# Patient Record
Sex: MALE | Race: WHITE | NOT HISPANIC OR LATINO | Employment: OTHER | URBAN - METROPOLITAN AREA
[De-identification: names, ages, dates, MRNs, and addresses within clinical notes are randomized per-mention and may not be internally consistent; named-entity substitution may affect disease eponyms.]

---

## 2024-09-01 ENCOUNTER — HOSPITAL ENCOUNTER (EMERGENCY)
Facility: HOSPITAL | Age: 74
Discharge: HOME | End: 2024-09-01
Attending: EMERGENCY MEDICINE | Admitting: EMERGENCY MEDICINE
Payer: MEDICARE

## 2024-09-01 ENCOUNTER — APPOINTMENT (OUTPATIENT)
Dept: GENERAL RADIOLOGY | Facility: HOSPITAL | Age: 74
End: 2024-09-01
Payer: MEDICARE

## 2024-09-01 VITALS
WEIGHT: 245 LBS | SYSTOLIC BLOOD PRESSURE: 145 MMHG | BODY MASS INDEX: 33.18 KG/M2 | OXYGEN SATURATION: 96 % | HEIGHT: 72 IN | DIASTOLIC BLOOD PRESSURE: 76 MMHG | HEART RATE: 73 BPM | TEMPERATURE: 98.4 F | RESPIRATION RATE: 18 BRPM

## 2024-09-01 DIAGNOSIS — R60.0 PERIPHERAL EDEMA: Primary | ICD-10-CM

## 2024-09-01 DIAGNOSIS — I50.9 CHRONIC HEART FAILURE, UNSPECIFIED HEART FAILURE TYPE: ICD-10-CM

## 2024-09-01 LAB
ALBUMIN SERPL-MCNC: 3.5 G/DL (ref 3.5–5.2)
ALBUMIN/GLOB SERPL: 1.3 G/DL
ALP SERPL-CCNC: 43 U/L (ref 39–117)
ALT SERPL W P-5'-P-CCNC: 30 U/L (ref 1–41)
ANION GAP SERPL CALCULATED.3IONS-SCNC: 9 MMOL/L (ref 5–15)
AST SERPL-CCNC: 34 U/L (ref 1–40)
BASOPHILS # BLD AUTO: 0.03 10*3/MM3 (ref 0–0.2)
BASOPHILS NFR BLD AUTO: 0.4 % (ref 0–1.5)
BILIRUB SERPL-MCNC: 0.3 MG/DL (ref 0–1.2)
BUN SERPL-MCNC: 25 MG/DL (ref 8–23)
BUN/CREAT SERPL: 21.7 (ref 7–25)
CALCIUM SPEC-SCNC: 8.9 MG/DL (ref 8.6–10.5)
CHLORIDE SERPL-SCNC: 103 MMOL/L (ref 98–107)
CO2 SERPL-SCNC: 31 MMOL/L (ref 22–29)
CREAT SERPL-MCNC: 1.15 MG/DL (ref 0.76–1.27)
DEPRECATED RDW RBC AUTO: 59 FL (ref 37–54)
EGFRCR SERPLBLD CKD-EPI 2021: 66.4 ML/MIN/1.73
EOSINOPHIL # BLD AUTO: 0.03 10*3/MM3 (ref 0–0.4)
EOSINOPHIL NFR BLD AUTO: 0.4 % (ref 0.3–6.2)
ERYTHROCYTE [DISTWIDTH] IN BLOOD BY AUTOMATED COUNT: 15.7 % (ref 12.3–15.4)
GEN 5 2HR TROPONIN T REFLEX: 113 NG/L
GLOBULIN UR ELPH-MCNC: 2.8 GM/DL
GLUCOSE SERPL-MCNC: 133 MG/DL (ref 65–99)
HCT VFR BLD AUTO: 38.2 % (ref 37.5–51)
HGB BLD-MCNC: 12.1 G/DL (ref 13–17.7)
HOLD SPECIMEN: NORMAL
IMM GRANULOCYTES # BLD AUTO: 0.02 10*3/MM3 (ref 0–0.05)
IMM GRANULOCYTES NFR BLD AUTO: 0.2 % (ref 0–0.5)
LYMPHOCYTES # BLD AUTO: 3.09 10*3/MM3 (ref 0.7–3.1)
LYMPHOCYTES NFR BLD AUTO: 37.9 % (ref 19.6–45.3)
MCH RBC QN AUTO: 32.4 PG (ref 26.6–33)
MCHC RBC AUTO-ENTMCNC: 31.7 G/DL (ref 31.5–35.7)
MCV RBC AUTO: 102.4 FL (ref 79–97)
MONOCYTES # BLD AUTO: 0.75 10*3/MM3 (ref 0.1–0.9)
MONOCYTES NFR BLD AUTO: 9.2 % (ref 5–12)
NEUTROPHILS NFR BLD AUTO: 4.23 10*3/MM3 (ref 1.7–7)
NEUTROPHILS NFR BLD AUTO: 51.9 % (ref 42.7–76)
NRBC BLD AUTO-RTO: 0 /100 WBC (ref 0–0.2)
NT-PROBNP SERPL-MCNC: 965.4 PG/ML (ref 0–1800)
PLATELET # BLD AUTO: 197 10*3/MM3 (ref 140–450)
PMV BLD AUTO: 9.3 FL (ref 6–12)
POTASSIUM SERPL-SCNC: 4.4 MMOL/L (ref 3.5–5.2)
PROT SERPL-MCNC: 6.3 G/DL (ref 6–8.5)
QT INTERVAL: 406 MS
QTC INTERVAL: 435 MS
RBC # BLD AUTO: 3.73 10*6/MM3 (ref 4.14–5.8)
SODIUM SERPL-SCNC: 143 MMOL/L (ref 136–145)
TROPONIN T DELTA: 4 NG/L
TROPONIN T SERPL HS-MCNC: 109 NG/L
WBC NRBC COR # BLD AUTO: 8.15 10*3/MM3 (ref 3.4–10.8)
WHOLE BLOOD HOLD COAG: NORMAL
WHOLE BLOOD HOLD SPECIMEN: NORMAL

## 2024-09-01 PROCEDURE — 80053 COMPREHEN METABOLIC PANEL: CPT | Performed by: EMERGENCY MEDICINE

## 2024-09-01 PROCEDURE — 96374 THER/PROPH/DIAG INJ IV PUSH: CPT

## 2024-09-01 PROCEDURE — 25010000002 BUMETANIDE PER 0.5 MG: Performed by: EMERGENCY MEDICINE

## 2024-09-01 PROCEDURE — 85025 COMPLETE CBC W/AUTO DIFF WBC: CPT | Performed by: EMERGENCY MEDICINE

## 2024-09-01 PROCEDURE — 83880 ASSAY OF NATRIURETIC PEPTIDE: CPT | Performed by: EMERGENCY MEDICINE

## 2024-09-01 PROCEDURE — 36415 COLL VENOUS BLD VENIPUNCTURE: CPT

## 2024-09-01 PROCEDURE — 71045 X-RAY EXAM CHEST 1 VIEW: CPT

## 2024-09-01 PROCEDURE — 84484 ASSAY OF TROPONIN QUANT: CPT | Performed by: EMERGENCY MEDICINE

## 2024-09-01 PROCEDURE — 99284 EMERGENCY DEPT VISIT MOD MDM: CPT

## 2024-09-01 PROCEDURE — 93005 ELECTROCARDIOGRAM TRACING: CPT | Performed by: EMERGENCY MEDICINE

## 2024-09-01 RX ORDER — BUMETANIDE 2 MG/1
2 TABLET ORAL DAILY
Qty: 3 TABLET | Refills: 0 | Status: SHIPPED | OUTPATIENT
Start: 2024-09-01 | End: 2024-09-04

## 2024-09-01 RX ORDER — SODIUM CHLORIDE 0.9 % (FLUSH) 0.9 %
10 SYRINGE (ML) INJECTION AS NEEDED
Status: DISCONTINUED | OUTPATIENT
Start: 2024-09-01 | End: 2024-09-02 | Stop reason: HOSPADM

## 2024-09-01 RX ORDER — BUMETANIDE 0.25 MG/ML
2 INJECTION INTRAMUSCULAR; INTRAVENOUS ONCE
Status: COMPLETED | OUTPATIENT
Start: 2024-09-01 | End: 2024-09-01

## 2024-09-01 RX ADMIN — BUMETANIDE 2 MG: 0.25 INJECTION INTRAMUSCULAR; INTRAVENOUS at 21:20

## 2024-09-02 NOTE — ED PROVIDER NOTES
[place]    EMERGENCY DEPARTMENT ENCOUNTER      Pt Name: Jonatan Mcguire  MRN: [id number]  YOB: 1950  Date of evaluation: 9/1/2024  Provider: Jethro Quarles DO    CHIEF COMPLAINT       Chief Complaint   Patient presents with    Shortness of Breath       HPI  Stated Reason for Visit: Pt arrives to ED via EMS from home c/o shortness of breath that has been getting worse over 1x weeks. Pt has a hx of COPD and CHF. Pt received a duo neb via ems. Pt has bilat ronchi and wheezes History Obtained From: EMS       HISTORY OF PRESENT ILLNESS  (Location/Symptom, Timing/Onset, Context/Setting, Quality, Duration, Modifying Factors, Severity.)   Jonatan Mcguire is a 74 y.o. male who presents to the emergency department via EMS for evaluation of shortness of breath and dyspnea with exertion which been progressive worsen over the last 1 week.  His known history of COPD, CHF, has been take his medication as prescribed, uses a CPAP and oxygen in the evenings.  Taking spironolactone 12.5 mg.  He notes basic activities which is usually upper performed gets very shortness of breath which is abnormal for him.  He believes he is on a low-dose diuretic, does not follow with a particular cardiologist.  He denies any fever, chills, he does note a viral illness approximately a week or 2 ago and states he had medication for treatment and therapies, denies any fever or chills.  He has a known history of peripheral edema, venous stasis dermatitis.  He denies any other acute systemic complaints at this time.      Nursing notes were reviewed.      PAST MEDICAL HISTORY   No past medical history on file.      SURGICAL HISTORY     No past surgical history on file.      CURRENT MEDICATIONS       Current Facility-Administered Medications:     sodium chloride 0.9 % flush 10 mL, 10 mL, Intravenous, PRN, Jethro Quarles DO    Current Outpatient Medications:     bumetanide (BUMEX) 2 MG tablet, Take 1 tablet by mouth Daily for 3 days.,  Disp: 3 tablet, Rfl: 0    ALLERGIES     Tetanus toxoids    FAMILY HISTORY     No family history on file.       SOCIAL HISTORY       Social History     Socioeconomic History    Marital status:          PHYSICAL EXAM    (up to 7 for level 4, 8 or more for level 5)     Vitals:    09/01/24 2147 09/01/24 2202 09/01/24 2217 09/01/24 2232   BP: 122/70 131/63 137/60 145/76   BP Location:       Patient Position:       Pulse: 68 62 64 73   Resp:       Temp:       TempSrc:       SpO2: 95% 96% 96% 96%   Weight:       Height:           Physical Exam  General : Patient is awake, alert, oriented, in no acute distress, nontoxic appearing  HEENT: Pupils are equally round, EOMI, conjunctivae clear  Neck: Neck is supple, full range of motion, trachea midline  Cardiac: Heart regular rate, rhythm, no murmurs, rubs, or gallops  Lungs: Lungs decreased breath sounds bilaterally, scattered rhonchi to bilateral bases  Abdomen: Abdomen is soft, nontender, nondistended. There are no firm or pulsatile masses, no rebound rigidity or guarding  Musculoskeletal:  No focal muscle deficits are appreciated  Neuro: Motor intact, sensory intact, level of consciousness is normal, cerebellar function is normal, reflexes are grossly normal. No evidence of incontinence or loss of bowel or bladder function, no saddle anesthesia noted   Dermatology: Venostasis dermatitis to bilateral lower extremities, 2+ edema from mid calf down through bilateral feet skin is warm and dry  Psych: Mentation is grossly normal, cognition is grossly normal. Affect is appropriate      DIAGNOSTIC RESULTS     EKG:  All EKGs are interpreted by the Emergency Department Physician who either signs or Co-signs this chart in the absence of a cardiologist.    ECG 12 Lead ED Triage Standing Order; SOA   Final Result   Test Reason : ED Triage Standing Order~   Blood Pressure :   */*   mmHG   Vent. Rate :  69 BPM     Atrial Rate :  52 BPM      P-R Int :   * ms          QRS Dur : 126  ms       QT Int : 406 ms       P-R-T Axes :   * 109 204 degrees      QTc Int : 435 ms      Atrial fibrillation with a competing junctional pacemaker   Nonspecific intraventricular block   Possible Right ventricular hypertrophy   T wave abnormality, consider inferior ischemia   T wave abnormality, consider anterolateral ischemia   Abnormal ECG   When compared with ECG of 18-AUG-2015 14:02,   Questionable change in QRS duration   Confirmed by RUTH CELIS MD ([id number]) on 9/1/2024 8:23:03 PM      Referred By: edmd           Confirmed By: RUTH CELIS MD          RADIOLOGY:     [x] Radiologist's Report Reviewed:  XR Chest 1 View   Final Result   Impression:   Cardiomegaly with mild pulmonary vascular congestion. Small left effusion with left basilar atelectasis.            Electronically Signed: Prasanth Gordillo MD     9/1/2024 8:25 PM EDT     Workstation ID: [id number]          I ordered and independently reviewed the above noted radiographic studies.      I viewed images of chest x-ray which showed cardiomegaly, vascular congestion per my independent interpretation.    See radiologist's dictation for official interpretation.      ED BEDSIDE ULTRASOUND:   Performed by ED Physician - none    LABS:    I have reviewed and interpreted all of the currently available lab results from this visit (if applicable):  Results for orders placed or performed during the hospital encounter of 09/01/24   Comprehensive Metabolic Panel    Specimen: Blood   Result Value Ref Range    Glucose 133 (H) 65 - 99 mg/dL    BUN 25 (H) 8 - 23 mg/dL    Creatinine 1.15 0.76 - 1.27 mg/dL    Sodium 143 136 - 145 mmol/L    Potassium 4.4 3.5 - 5.2 mmol/L    Chloride 103 98 - 107 mmol/L    CO2 31.0 (H) 22.0 - 29.0 mmol/L    Calcium 8.9 8.6 - 10.5 mg/dL    Total Protein 6.3 6.0 - 8.5 g/dL    Albumin 3.5 3.5 - 5.2 g/dL    ALT (SGPT) 30 1 - 41 U/L    AST (SGOT) 34 1 - 40 U/L    Alkaline Phosphatase 43 39 - 117 U/L    Total Bilirubin 0.3 0.0 - 1.2 mg/dL     Globulin 2.8 gm/dL    A/G Ratio 1.3 g/dL    BUN/Creatinine Ratio 21.7 7.0 - 25.0    Anion Gap 9.0 5.0 - 15.0 mmol/L    eGFR 66.4 >60.0 mL/min/1.73   BNP    Specimen: Blood   Result Value Ref Range    proBNP 965.4 0.0 - 1,800.0 pg/mL   Single High Sensitivity Troponin T    Specimen: Blood   Result Value Ref Range    HS Troponin T 109 (C) <22 ng/L   CBC Auto Differential    Specimen: Blood   Result Value Ref Range    WBC 8.15 3.40 - 10.80 10*3/mm3    RBC 3.73 (L) 4.14 - 5.80 10*6/mm3    Hemoglobin 12.1 (L) 13.0 - 17.7 g/dL    Hematocrit 38.2 37.5 - 51.0 %    .4 (H) 79.0 - 97.0 fL    MCH 32.4 26.6 - 33.0 pg    MCHC 31.7 31.5 - 35.7 g/dL    RDW 15.7 (H) 12.3 - 15.4 %    RDW-SD 59.0 (H) 37.0 - 54.0 fl    MPV 9.3 6.0 - 12.0 fL    Platelets 197 140 - 450 10*3/mm3    Neutrophil % 51.9 42.7 - 76.0 %    Lymphocyte % 37.9 19.6 - 45.3 %    Monocyte % 9.2 5.0 - 12.0 %    Eosinophil % 0.4 0.3 - 6.2 %    Basophil % 0.4 0.0 - 1.5 %    Immature Grans % 0.2 0.0 - 0.5 %    Neutrophils, Absolute 4.23 1.70 - 7.00 10*3/mm3    Lymphocytes, Absolute 3.09 0.70 - 3.10 10*3/mm3    Monocytes, Absolute 0.75 0.10 - 0.90 10*3/mm3    Eosinophils, Absolute 0.03 0.00 - 0.40 10*3/mm3    Basophils, Absolute 0.03 0.00 - 0.20 10*3/mm3    Immature Grans, Absolute 0.02 0.00 - 0.05 10*3/mm3    nRBC 0.0 0.0 - 0.2 /100 WBC   High Sensitivity Troponin T 2Hr    Specimen: Blood   Result Value Ref Range    HS Troponin T 113 (C) <22 ng/L    Troponin T Delta 4 (C) >=-4 - <+4 ng/L   ECG 12 Lead ED Triage Standing Order; SOA   Result Value Ref Range    QT Interval 406 ms    QTC Interval 435 ms   Green Top (Gel)   Result Value Ref Range    Extra Tube Hold for add-ons.    Lavender Top   Result Value Ref Range    Extra Tube hold for add-on    Gold Top - SST   Result Value Ref Range    Extra Tube Hold for add-ons.    Gray Top   Result Value Ref Range    Extra Tube Hold for add-ons.    Light Blue Top   Result Value Ref Range    Extra Tube Hold for add-ons.          If labs were ordered, I independently reviewed the results and considered them in treating the patient.      EMERGENCY DEPARTMENT COURSE and DIFFERENTIAL DIAGNOSIS/MDM:   Vitals:  AS OF 23:49 EDT    BP - 145/76  HR - 73  TEMP - 98.4 °F (36.9 °C) (Oral)  O2 SATS - 96%      Orders placed during this visit:  Orders Placed This Encounter   Procedures    XR Chest 1 View    [place] Draw    Comprehensive Metabolic Panel    BNP    Single High Sensitivity Troponin T    CBC Auto Differential    High Sensitivity Troponin T 2Hr    NPO Diet NPO Type: Strict NPO    Undress & Gown    Continuous Pulse Oximetry    Vital Signs    Oxygen Therapy- Nasal Cannula; Titrate 1-6 LPM Per SpO2; 90 - 95%    ECG 12 Lead ED Triage Standing Order; SOA    Insert Peripheral IV    CBC & Differential    Green Top (Gel)    Lavender Top    Gold Top - SST    Gray Top    Light Blue Top       All labs have been independently reviewed by me.  All radiology studies have been reviewed by me and the radiologist dictating the report.  All EKG's have been independently viewed and interpreted by me.      Discussion below represents my analysis of pertinent findings related to patient's condition, differential diagnosis, treatment plan and final disposition.    Differential diagnosis:  The differential diagnosis associated with the patient's presentation includes: Acute on chronic heart failure, pleural effusion, pneumonia, COPD exacerbation, sepsis    Additional sources  Discussed/ obtained information from independent historians:   [] Spouse  [] Parent  [] Family member  [] Friend  [x] EMS   [] Other:    External (non-ED) record review:   [] Inpatient record:   [] Office record:   [] Outpatient record:   [] Prior Outpatient labs:   [] Prior Outpatient radiology:   [] Primary Care record:   [] Outside ED record:   [] Other:     Patient's care impacted by:   [] Diabetes  [] Hypertension  [x] CHF  [] Hyperlipidemia  [] Coronary Artery Disease   [x] COPD   []  Cancer   [] Tobacco Abuse   [] Substance Abuse    [] Other:     Care significantly affected by Social Determinants of Health (housing and economic circumstances, unemployment)    [] Yes     [x] No   If yes, Patient's care significantly limited by Social Determinants of Health including:   [] Inadequate housing   [] Low income   [] Alcoholism and drug addiction in family   [] Problems related to primary support group   [] Unemployment   [] Problems related to employment   [] Other Social Determinants of Health:       MEDICATIONS ADMINISTERED IN ED:  Medications   sodium chloride 0.9 % flush 10 mL (has no administration in time range)   bumetanide (BUMEX) injection 2 mg (2 mg Intravenous Given 9/1/24 2120)              This is 74-year-old male who presents with shortness of breath worsening dyspnea over the last 1 week.  Has underlying history of congestive heart failure and COPD.  He is on spironolactone 12.5 mg daily, has been estates dermatitis of bilateral lower extremities, oxygen saturation 88 to 90% on room air at rest.  Patient was placed on supplemental oxygen, IV diuresis.  His labs obtained.  Vital signs are otherwise stable, patient is afebrile.  Chest x-ray with cardiomegaly, very mild effusion.  Patient's BNP is 965, creatinine 1.15.  Troponins were 109, 113 on recheck.  Patient is chest pain-free, he has been diuresing after the IV Bumex.  He has a CPAP machine and supplemental oxygen at home, he would feel more comfortable at home with increased diuresis for the next few days.  He has his cardiologist through [facility] who we will call on Tuesday morning, establish care with them already.  He would prefer to increase his diuretic for the next few days, monitoring his symptoms, and follow closely with his PCP in addition to his cardiologist.  Here prefer this rather than being admitted at this time.  Patient will be given Bumex for the next few days, will have him follow his symptoms closely with  his cardiology team, return to the ED with any worsening symptoms or further concerns in the meantime.    I had a discussion with the patient/family regarding diagnosis, diagnostic results, treatment plan, and medications.  The patient/family indicated understanding of these instructions.  I spent adequate time at the bedside preceding discharge necessary to personally discuss the aftercare instructions, giving patient education, providing explanations of the results of our evaluations/findings, and my decision making to assure that the patient/family understand the plan of care.  Time was allotted to answer questions at that time and throughout the ED course.  Emphasis was placed on timely follow-up after discharge.  I also discussed the potential for the development of an acute emergent condition requiring further evaluation, admission, or even surgical intervention. I discussed that we found nothing during the visit today indicating the need for further workup, admission, or the presence of an unstable medical condition.  I encouraged the patient to return to the emergency department immediately for ANY concerns, worsening, new complaints, or if symptoms persist and unable to seek follow-up in a timely fashion.  The patient/family expressed understanding and agreement with this plan.  The patient will follow-up with their PCP in 1-2 days for reevaluation.     PROCEDURES:  Procedures    CRITICAL CARE TIME    Total Critical Care time was 0 minutes, excluding separately reportable procedures.   There was a high probability of clinically significant/life threatening deterioration in the patient's condition which required my urgent intervention.      FINAL IMPRESSION      1. Peripheral edema    2. Chronic heart failure, unspecified heart failure type          DISPOSITION/PLAN     ED Disposition       ED Disposition   Discharge    Condition   Stable    Comment   --               PATIENT REFERRED TO:  Your cardiology  team through [facility]    Schedule an appointment as soon as possible for a visit       [facility] EMERGENCY DEPARTMENT  [address] [facility] [address] [phone]    If symptoms worsen      DISCHARGE MEDICATIONS:     Medication List        START taking these medications      bumetanide 2 MG tablet  Commonly known as: BUMEX  Take 1 tablet by mouth Daily for 3 days.               Where to Get Your Medications        These medications were sent to [facility] [id number] - [address] [phone] [place] [id number] [address]      Phone: [phone]   bumetanide 2 MG tablet             Comment: Please note this report has been produced using speech recognition software.      Jethro Quarles DO  Attending Emergency Physician         Jethro Quarles,   09/01/24 0503

## 2024-10-01 ENCOUNTER — HOSPITAL ENCOUNTER (EMERGENCY)
Facility: HOSPITAL | Age: 74
Discharge: HOME | End: 2024-10-02
Attending: EMERGENCY MEDICINE
Payer: MEDICARE

## 2024-10-01 ENCOUNTER — APPOINTMENT (OUTPATIENT)
Dept: GENERAL RADIOLOGY | Facility: HOSPITAL | Age: 74
End: 2024-10-01
Payer: MEDICARE

## 2024-10-01 DIAGNOSIS — J12.82 PNEUMONIA DUE TO COVID-19 VIRUS: Primary | ICD-10-CM

## 2024-10-01 DIAGNOSIS — U07.1 PNEUMONIA DUE TO COVID-19 VIRUS: Primary | ICD-10-CM

## 2024-10-01 DIAGNOSIS — J96.21 ACUTE ON CHRONIC RESPIRATORY FAILURE WITH HYPOXIA: ICD-10-CM

## 2024-10-01 DIAGNOSIS — J44.1 COPD WITH ACUTE EXACERBATION: ICD-10-CM

## 2024-10-01 LAB
ALBUMIN SERPL-MCNC: 3.5 G/DL (ref 3.5–5.2)
ALBUMIN/GLOB SERPL: 1.2 G/DL
ALP SERPL-CCNC: 52 U/L (ref 39–117)
ALT SERPL W P-5'-P-CCNC: 32 U/L (ref 1–41)
ANION GAP SERPL CALCULATED.3IONS-SCNC: 9 MMOL/L (ref 5–15)
AST SERPL-CCNC: 37 U/L (ref 1–40)
ATMOSPHERIC PRESS: ABNORMAL MM[HG]
BASE EXCESS BLDV CALC-SCNC: 6.3 MMOL/L (ref -2–2)
BASOPHILS # BLD AUTO: 0.02 10*3/MM3 (ref 0–0.2)
BASOPHILS NFR BLD AUTO: 0.2 % (ref 0–1.5)
BDY SITE: ABNORMAL
BILIRUB SERPL-MCNC: 0.9 MG/DL (ref 0–1.2)
BODY TEMPERATURE: 37
BUN SERPL-MCNC: 24 MG/DL (ref 8–23)
BUN/CREAT SERPL: 23.1 (ref 7–25)
CALCIUM SPEC-SCNC: 8.6 MG/DL (ref 8.6–10.5)
CHLORIDE SERPL-SCNC: 102 MMOL/L (ref 98–107)
CO2 BLDA-SCNC: 35.1 MMOL/L (ref 22–33)
CO2 SERPL-SCNC: 30 MMOL/L (ref 22–29)
COHGB MFR BLD: 2.2 %
CREAT SERPL-MCNC: 1.04 MG/DL (ref 0.76–1.27)
D-LACTATE SERPL-SCNC: 0.8 MMOL/L (ref 0.5–2)
DEPRECATED RDW RBC AUTO: 59.4 FL (ref 37–54)
EGFRCR SERPLBLD CKD-EPI 2021: 74.9 ML/MIN/1.73
EOSINOPHIL # BLD AUTO: 0 10*3/MM3 (ref 0–0.4)
EOSINOPHIL NFR BLD AUTO: 0 % (ref 0.3–6.2)
EPAP: 0
ERYTHROCYTE [DISTWIDTH] IN BLOOD BY AUTOMATED COUNT: 15.2 % (ref 12.3–15.4)
GLOBULIN UR ELPH-MCNC: 2.9 GM/DL
GLUCOSE SERPL-MCNC: 88 MG/DL (ref 65–99)
HCO3 BLDV-SCNC: 33.3 MMOL/L (ref 22–28)
HCT VFR BLD AUTO: 37.6 % (ref 37.5–51)
HGB BLD-MCNC: 11.9 G/DL (ref 13–17.7)
HGB BLDA-MCNC: 12.2 G/DL (ref 13.5–17.5)
IMM GRANULOCYTES # BLD AUTO: 0.06 10*3/MM3 (ref 0–0.05)
IMM GRANULOCYTES NFR BLD AUTO: 0.5 % (ref 0–0.5)
INHALED O2 CONCENTRATION: 28 %
IPAP: 0
LYMPHOCYTES # BLD AUTO: 1.96 10*3/MM3 (ref 0.7–3.1)
LYMPHOCYTES NFR BLD AUTO: 15.7 % (ref 19.6–45.3)
MCH RBC QN AUTO: 32.7 PG (ref 26.6–33)
MCHC RBC AUTO-ENTMCNC: 31.6 G/DL (ref 31.5–35.7)
MCV RBC AUTO: 103.3 FL (ref 79–97)
METHGB BLD QL: 0.6 %
MODALITY: ABNORMAL
MONOCYTES # BLD AUTO: 0.86 10*3/MM3 (ref 0.1–0.9)
MONOCYTES NFR BLD AUTO: 6.9 % (ref 5–12)
NEUTROPHILS NFR BLD AUTO: 76.7 % (ref 42.7–76)
NEUTROPHILS NFR BLD AUTO: 9.56 10*3/MM3 (ref 1.7–7)
NRBC BLD AUTO-RTO: 0 /100 WBC (ref 0–0.2)
OXYHGB MFR BLDV: 49.8 %
PAW @ PEAK INSP FLOW SETTING VENT: 0 CMH2O
PCO2 BLDV: 58.5 MM HG (ref 41–51)
PH BLDV: 7.36 PH UNITS (ref 7.31–7.41)
PLATELET # BLD AUTO: 136 10*3/MM3 (ref 140–450)
PMV BLD AUTO: 9.8 FL (ref 6–12)
PO2 BLDV: 29.6 MM HG (ref 27–53)
POTASSIUM SERPL-SCNC: 4.4 MMOL/L (ref 3.5–5.2)
PROT SERPL-MCNC: 6.4 G/DL (ref 6–8.5)
RBC # BLD AUTO: 3.64 10*6/MM3 (ref 4.14–5.8)
SODIUM SERPL-SCNC: 141 MMOL/L (ref 136–145)
TOTAL RATE: 0 BREATHS/MINUTE
WBC NRBC COR # BLD AUTO: 12.46 10*3/MM3 (ref 3.4–10.8)

## 2024-10-01 PROCEDURE — 25010000002 KETOROLAC TROMETHAMINE PER 15 MG: Performed by: EMERGENCY MEDICINE

## 2024-10-01 PROCEDURE — 82805 BLOOD GASES W/O2 SATURATION: CPT

## 2024-10-01 PROCEDURE — 25010000002 METHYLPREDNISOLONE PER 125 MG: Performed by: EMERGENCY MEDICINE

## 2024-10-01 PROCEDURE — 80053 COMPREHEN METABOLIC PANEL: CPT | Performed by: EMERGENCY MEDICINE

## 2024-10-01 PROCEDURE — 25010000002 MAGNESIUM SULFATE 2 GM/50ML SOLUTION: Performed by: EMERGENCY MEDICINE

## 2024-10-01 PROCEDURE — 96366 THER/PROPH/DIAG IV INF ADDON: CPT

## 2024-10-01 PROCEDURE — 71045 X-RAY EXAM CHEST 1 VIEW: CPT

## 2024-10-01 PROCEDURE — 93005 ELECTROCARDIOGRAM TRACING: CPT | Performed by: EMERGENCY MEDICINE

## 2024-10-01 PROCEDURE — 83605 ASSAY OF LACTIC ACID: CPT | Performed by: EMERGENCY MEDICINE

## 2024-10-01 PROCEDURE — 96375 TX/PRO/DX INJ NEW DRUG ADDON: CPT

## 2024-10-01 PROCEDURE — 25810000003 SODIUM CHLORIDE 0.9 % SOLUTION: Performed by: EMERGENCY MEDICINE

## 2024-10-01 PROCEDURE — 99285 EMERGENCY DEPT VISIT HI MDM: CPT

## 2024-10-01 PROCEDURE — 94799 UNLISTED PULMONARY SVC/PX: CPT

## 2024-10-01 PROCEDURE — 85025 COMPLETE CBC W/AUTO DIFF WBC: CPT | Performed by: EMERGENCY MEDICINE

## 2024-10-01 PROCEDURE — 94640 AIRWAY INHALATION TREATMENT: CPT

## 2024-10-01 PROCEDURE — 96365 THER/PROPH/DIAG IV INF INIT: CPT

## 2024-10-01 RX ORDER — IPRATROPIUM BROMIDE AND ALBUTEROL SULFATE 2.5; .5 MG/3ML; MG/3ML
3 SOLUTION RESPIRATORY (INHALATION) ONCE
Status: COMPLETED | OUTPATIENT
Start: 2024-10-01 | End: 2024-10-01

## 2024-10-01 RX ORDER — ACETAMINOPHEN 500 MG
1000 TABLET ORAL ONCE
Status: DISCONTINUED | OUTPATIENT
Start: 2024-10-01 | End: 2024-10-02 | Stop reason: HOSPADM

## 2024-10-01 RX ORDER — KETOROLAC TROMETHAMINE 15 MG/ML
15 INJECTION, SOLUTION INTRAMUSCULAR; INTRAVENOUS ONCE
Status: COMPLETED | OUTPATIENT
Start: 2024-10-01 | End: 2024-10-01

## 2024-10-01 RX ORDER — MAGNESIUM SULFATE HEPTAHYDRATE 40 MG/ML
2 INJECTION, SOLUTION INTRAVENOUS ONCE
Status: COMPLETED | OUTPATIENT
Start: 2024-10-01 | End: 2024-10-01

## 2024-10-01 RX ORDER — METHYLPREDNISOLONE SODIUM SUCCINATE 125 MG/2ML
125 INJECTION, POWDER, LYOPHILIZED, FOR SOLUTION INTRAMUSCULAR; INTRAVENOUS ONCE
Status: COMPLETED | OUTPATIENT
Start: 2024-10-01 | End: 2024-10-01

## 2024-10-01 RX ORDER — SODIUM CHLORIDE 0.9 % (FLUSH) 0.9 %
10 SYRINGE (ML) INJECTION AS NEEDED
Status: DISCONTINUED | OUTPATIENT
Start: 2024-10-01 | End: 2024-10-02 | Stop reason: HOSPADM

## 2024-10-01 RX ADMIN — MAGNESIUM SULFATE HEPTAHYDRATE 2 G: 40 INJECTION, SOLUTION INTRAVENOUS at 22:19

## 2024-10-01 RX ADMIN — KETOROLAC TROMETHAMINE 15 MG: 15 INJECTION, SOLUTION INTRAMUSCULAR; INTRAVENOUS at 22:19

## 2024-10-01 RX ADMIN — METHYLPREDNISOLONE SODIUM SUCCINATE 125 MG: 125 INJECTION, POWDER, LYOPHILIZED, FOR SOLUTION INTRAMUSCULAR; INTRAVENOUS at 22:20

## 2024-10-01 RX ADMIN — IPRATROPIUM BROMIDE AND ALBUTEROL SULFATE 3 ML: 2.5; .5 SOLUTION RESPIRATORY (INHALATION) at 22:09

## 2024-10-01 RX ADMIN — SODIUM CHLORIDE 500 ML: 9 INJECTION, SOLUTION INTRAVENOUS at 22:19

## 2024-10-02 VITALS
HEIGHT: 72 IN | SYSTOLIC BLOOD PRESSURE: 116 MMHG | BODY MASS INDEX: 33.18 KG/M2 | RESPIRATION RATE: 18 BRPM | HEART RATE: 72 BPM | WEIGHT: 245 LBS | OXYGEN SATURATION: 95 % | DIASTOLIC BLOOD PRESSURE: 58 MMHG | TEMPERATURE: 98.5 F

## 2024-10-02 LAB
FLUAV RNA RESP QL NAA+PROBE: NOT DETECTED
FLUBV RNA RESP QL NAA+PROBE: NOT DETECTED
QT INTERVAL: 352 MS
QTC INTERVAL: 418 MS
SARS-COV-2 RNA RESP QL NAA+PROBE: DETECTED

## 2024-10-02 PROCEDURE — 94799 UNLISTED PULMONARY SVC/PX: CPT

## 2024-10-02 PROCEDURE — 87636 SARSCOV2 & INF A&B AMP PRB: CPT | Performed by: EMERGENCY MEDICINE

## 2024-10-02 RX ORDER — MONTELUKAST SODIUM 10 MG/1
10 TABLET ORAL NIGHTLY
COMMUNITY

## 2024-10-02 RX ORDER — PREDNISONE 20 MG/1
60 TABLET ORAL DAILY
Qty: 15 TABLET | Refills: 0 | Status: SHIPPED | OUTPATIENT
Start: 2024-10-02 | End: 2024-10-05

## 2024-10-02 RX ORDER — LISINOPRIL 2.5 MG/1
2.5 TABLET ORAL DAILY
COMMUNITY
End: 2024-10-05

## 2024-10-02 RX ORDER — DOXAZOSIN 8 MG/1
8 TABLET ORAL NIGHTLY
COMMUNITY

## 2024-10-02 RX ORDER — PRENATAL WITH FERROUS FUM AND FOLIC ACID 3080; 920; 120; 400; 22; 1.84; 3; 20; 10; 1; 12; 200; 27; 25; 2 [IU]/1; [IU]/1; MG/1; [IU]/1; MG/1; MG/1; MG/1; MG/1; MG/1; MG/1; UG/1; MG/1; MG/1; MG/1; MG/1
1 TABLET ORAL DAILY
COMMUNITY

## 2024-10-02 RX ORDER — SPIRONOLACTONE 25 MG/1
12.5 TABLET ORAL DAILY
Status: ON HOLD | COMMUNITY
End: 2024-10-10

## 2024-10-02 RX ORDER — PREDNISONE 20 MG/1
60 TABLET ORAL DAILY
COMMUNITY
Start: 2024-10-02 | End: 2024-10-10 | Stop reason: HOSPADM

## 2024-10-02 RX ORDER — GLIPIZIDE 5 MG/1
5 TABLET, FILM COATED, EXTENDED RELEASE ORAL DAILY
COMMUNITY

## 2024-10-02 RX ORDER — ACETAMINOPHEN 80 MG/1
80 TABLET, CHEWABLE ORAL EVERY 4 HOURS PRN
COMMUNITY
End: 2024-10-05

## 2024-10-02 RX ORDER — IPRATROPIUM BROMIDE AND ALBUTEROL SULFATE 2.5; .5 MG/3ML; MG/3ML
3 SOLUTION RESPIRATORY (INHALATION) ONCE
Status: COMPLETED | OUTPATIENT
Start: 2024-10-02 | End: 2024-10-02

## 2024-10-02 RX ORDER — HYDRALAZINE HYDROCHLORIDE 10 MG/1
10 TABLET, FILM COATED ORAL 3 TIMES DAILY
COMMUNITY
End: 2024-10-05

## 2024-10-02 RX ADMIN — IPRATROPIUM BROMIDE AND ALBUTEROL SULFATE 3 ML: 2.5; .5 SOLUTION RESPIRATORY (INHALATION) at 01:40

## 2024-10-02 NOTE — ED PROVIDER NOTES
Subjective   History of Present Illness  Patient presents for evaluation of symptoms including shortness of breath, cough, congestion, myalgias.  These have been progressively worsening over the past 3 days.  He took a home test for COVID and was positive today.  Patient has a history of COPD and feels that is acting up with increased wheezing.  He is currently on his 2 L nasal cannula which she wears most of the time.  He has had fever.  He is not having any specific chest pain.    History provided by:  Patient      Review of Systems    No past medical history on file.    Allergies   Allergen Reactions    Tetanus Toxoids Unknown - Low Severity       No past surgical history on file.    No family history on file.    Social History     Socioeconomic History    Marital status:            Objective   Physical Exam  Constitutional:       General: He is not in acute distress.     Appearance: He is ill-appearing.   HENT:      Head: Normocephalic and atraumatic.   Eyes:      Conjunctiva/sclera: Conjunctivae normal.      Pupils: Pupils are equal, round, and reactive to light.   Cardiovascular:      Rate and Rhythm: Normal rate and regular rhythm.      Pulses: Normal pulses.      Heart sounds: No murmur heard.     No gallop.   Pulmonary:      Comments: Tachypneic without increased work of breathing.  Diffuse expiratory wheezing with moderately diminished air movement bilaterally  Abdominal:      General: Abdomen is flat. There is no distension.      Tenderness: There is no abdominal tenderness.   Musculoskeletal:         General: No swelling or deformity. Normal range of motion.      Right lower leg: Edema present.      Left lower leg: Edema present.   Skin:     General: Skin is warm and dry.      Capillary Refill: Capillary refill takes less than 2 seconds.   Neurological:      General: No focal deficit present.      Mental Status: He is alert and oriented to person, place, and time.   Psychiatric:         Mood and  Affect: Mood normal.         Behavior: Behavior normal.         Critical Care    Performed by: Isma Armas MD  Authorized by: Isma Armas MD    Critical care provider statement:     Critical care time (minutes):  30    Critical care time was exclusive of:  Separately billable procedures and treating other patients    Critical care was necessary to treat or prevent imminent or life-threatening deterioration of the following conditions:  Respiratory failure    Critical care was time spent personally by me on the following activities:  Development of treatment plan with patient or surrogate, discussions with consultants, evaluation of patient's response to treatment, examination of patient, obtaining history from patient or surrogate, ordering and performing treatments and interventions, ordering and review of laboratory studies, ordering and review of radiographic studies, pulse oximetry, re-evaluation of patient's condition and review of old charts    I assumed direction of critical care for this patient from another provider in my specialty: no      Care discussed with: admitting provider               ED Course  ED Course as of 10/02/24 0257   Tue Oct 01, 2024   2237 Chest x-ray independently interpreted by myself demonstrates bibasilar infiltrates, left pleural effusion [KB]   2340 Laboratory workup independently interpreted by myself is notable for evidence of chronic hypercarbic respiratory failure without acute pH derangement, leukocytosis, normal lactic acid.  COVID test is positive [KB]   Wed Oct 02, 2024   0036 Twelve-lead ECG independently interpreted by myself demonstrates atrial fibrillation but is rate controlled, no ST segment elevation or depression [KB]      ED Course User Index  [KB] Isma Armas MD                                             Medical Decision Making  Patient signs and symptoms are consistent with COVID-19 infection with secondary COPD exacerbation.  Also consider congestive  heart failure exacerbation, bacterial pneumonia.  Lab and imaging studies were conducted.  Patient was given DuoNebs, 2 g IV magnesium, 125 mg IV methylprednisolone for treatment of obstructive lung disease.  He was given Tylenol and Toradol for myalgias and fever.    Patient was reevaluated on multiple occasions.  His breathing is substantially improved, no ongoing tachypnea.  His wheezing is resolved and he has good air movement bilaterally.  On an ambulatory trial he does briefly drop his oxygen saturations to the mid to high 80s on his home 2 L nasal cannula.    I discussed inpatient versus outpatient management with the patient.  I recommended to him given his oxygen saturation drop with ambulation that he be treated as an inpatient but he declined stating that he would like to go home today as he was feeling much better.  I counseled him on the use of Paxlovid, medication interactions, using steroids to help with COPD exacerbation.  He was counseled strictly on return precautions to the emergency room and was ultimately discharged.    Problems Addressed:  Acute on chronic respiratory failure with hypoxia: complicated acute illness or injury  COPD with acute exacerbation: complicated acute illness or injury with systemic symptoms that poses a threat to life or bodily functions  Pneumonia due to COVID-19 virus: complicated acute illness or injury with systemic symptoms that poses a threat to life or bodily functions    Amount and/or Complexity of Data Reviewed  Independent Historian: EMS     Details: Prehospital course by EMS  External Data Reviewed: notes.     Details: 9/1/2024 reviewed most recent ER visit where patient was treated for congestive heart failure with peripheral edema  Labs: ordered. Decision-making details documented in ED Course.  Radiology: ordered and independent interpretation performed. Decision-making details documented in ED Course.  ECG/medicine tests: ordered and independent  interpretation performed. Decision-making details documented in ED Course.  Discussion of management or test interpretation with external provider(s): Consulted with pharmacy for management of medication interactions with Paxlovid therapy    Risk  OTC drugs.  Prescription drug management.  Decision regarding hospitalization.    Critical Care  Total time providing critical care: 30 minutes        Final diagnoses:   Pneumonia due to COVID-19 virus   COPD with acute exacerbation   Acute on chronic respiratory failure with hypoxia       ED Disposition  ED Disposition       ED Disposition   Discharge    Condition   Stable    Comment   --             Recent Results (from the past 24 hour(s))   Comprehensive Metabolic Panel    Collection Time: 10/01/24 10:01 PM    Specimen: Blood   Result Value Ref Range    Glucose 88 65 - 99 mg/dL    BUN 24 (H) 8 - 23 mg/dL    Creatinine 1.04 0.76 - 1.27 mg/dL    Sodium 141 136 - 145 mmol/L    Potassium 4.4 3.5 - 5.2 mmol/L    Chloride 102 98 - 107 mmol/L    CO2 30.0 (H) 22.0 - 29.0 mmol/L    Calcium 8.6 8.6 - 10.5 mg/dL    Total Protein 6.4 6.0 - 8.5 g/dL    Albumin 3.5 3.5 - 5.2 g/dL    ALT (SGPT) 32 1 - 41 U/L    AST (SGOT) 37 1 - 40 U/L    Alkaline Phosphatase 52 39 - 117 U/L    Total Bilirubin 0.9 0.0 - 1.2 mg/dL    Globulin 2.9 gm/dL    A/G Ratio 1.2 g/dL    BUN/Creatinine Ratio 23.1 7.0 - 25.0    Anion Gap 9.0 5.0 - 15.0 mmol/L    eGFR 74.9 >60.0 mL/min/1.73   Lactic Acid, Plasma    Collection Time: 10/01/24 10:01 PM    Specimen: Blood   Result Value Ref Range    Lactate 0.8 0.5 - 2.0 mmol/L   CBC Auto Differential    Collection Time: 10/01/24 10:01 PM    Specimen: Blood   Result Value Ref Range    WBC 12.46 (H) 3.40 - 10.80 10*3/mm3    RBC 3.64 (L) 4.14 - 5.80 10*6/mm3    Hemoglobin 11.9 (L) 13.0 - 17.7 g/dL    Hematocrit 37.6 37.5 - 51.0 %    .3 (H) 79.0 - 97.0 fL    MCH 32.7 26.6 - 33.0 pg    MCHC 31.6 31.5 - 35.7 g/dL    RDW 15.2 12.3 - 15.4 %    RDW-SD 59.4 (H) 37.0 -  54.0 fl    MPV 9.8 6.0 - 12.0 fL    Platelets 136 (L) 140 - 450 10*3/mm3    Neutrophil % 76.7 (H) 42.7 - 76.0 %    Lymphocyte % 15.7 (L) 19.6 - 45.3 %    Monocyte % 6.9 5.0 - 12.0 %    Eosinophil % 0.0 (L) 0.3 - 6.2 %    Basophil % 0.2 0.0 - 1.5 %    Immature Grans % 0.5 0.0 - 0.5 %    Neutrophils, Absolute 9.56 (H) 1.70 - 7.00 10*3/mm3    Lymphocytes, Absolute 1.96 0.70 - 3.10 10*3/mm3    Monocytes, Absolute 0.86 0.10 - 0.90 10*3/mm3    Eosinophils, Absolute 0.00 0.00 - 0.40 10*3/mm3    Basophils, Absolute 0.02 0.00 - 0.20 10*3/mm3    Immature Grans, Absolute 0.06 (H) 0.00 - 0.05 10*3/mm3    nRBC 0.0 0.0 - 0.2 /100 WBC   ECG 12 Lead Dyspnea    Collection Time: 10/01/24 10:05 PM   Result Value Ref Range    QT Interval 352 ms    QTC Interval 418 ms   Blood Gas, Venous With Co-Ox    Collection Time: 10/01/24 10:06 PM    Specimen: Venous Blood   Result Value Ref Range    Site Nurse/Dr Draw     pH, Venous 7.363 7.310 - 7.410 pH Units    pCO2, Venous 58.5 (H) 41.0 - 51.0 mm Hg    pO2, Venous 29.6 27.0 - 53.0 mm Hg    HCO3, Venous 33.3 (H) 22.0 - 28.0 mmol/L    Base Excess, Venous 6.3 (H) -2.0 - 2.0 mmol/L    Hemoglobin, Blood Gas 12.2 (L) 13.5 - 17.5 g/dL    Oxyhemoglobin Venous 49.8 %    Methemoglobin Venous 0.6 %    Carboxyhemoglobin Venous 2.2 %    CO2 Content 35.1 (H) 22 - 33 mmol/L    Temperature 37.0     Barometric Pressure for Blood Gas      Modality Nasal Cannula     FIO2 28 %    Rate 0 Breaths/minute    PIP 0 cmH2O    IPAP 0     EPAP 0    COVID-19 and FLU A/B PCR, 1 HR TAT - Swab, Nasopharynx    Collection Time: 10/02/24 12:04 AM    Specimen: Nasopharynx; Swab   Result Value Ref Range    COVID19 Detected (C) Not Detected - Ref. Range    Influenza A PCR Not Detected Not Detected    Influenza B PCR Not Detected Not Detected     Note: In addition to lab results from this visit, the labs listed above may include labs taken at another facility or during a different encounter within the last 24 hours. Please correlate  lab times with ED admission and discharge times for further clarification of the services performed during this visit.    XR Chest 1 View   Final Result   Impression:   1.Worsening ill-defined airspace infiltrates are noted within the mid to lower lungs bilaterally. Interstitial changes are seen bilaterally. There is also a small left pleural effusion. The findings may indicate worsening CHF and pulmonary edema.    Developing atypical/viral infection or multifocal pneumonia could also be considered.         Electronically Signed: Andrea Vallejo MD     10/1/2024 10:18 PM EDT     Workstation ID: [id number]        Vitals:    10/01/24 2330 10/02/24 0027 10/02/24 0106 10/02/24 0140   BP: 136/60  116/58    Pulse: 96  72    Resp:   17 18   Temp:   98.5 °F (36.9 °C)    TempSrc:   Oral    SpO2:  (!) 87% 95%    Weight:       Height:         Medications   sodium chloride 0.9 % flush 10 mL (has no administration in time range)   acetaminophen (TYLENOL) tablet 1,000 mg (1,000 mg Oral Not Given 10/1/24 2208)   sodium chloride 0.9 % bolus 500 mL (0 mL Intravenous Stopped 10/1/24 2359)   ipratropium-albuterol (DUO-NEB) nebulizer solution 3 mL (3 mL Nebulization Given 10/1/24 2209)   magnesium sulfate 2g/50 mL (PREMIX) infusion (0 g Intravenous Stopped 10/1/24 2359)   methylPREDNISolone sodium succinate (SOLU-Medrol) injection 125 mg (125 mg Intravenous Given 10/1/24 2220)   ketorolac (TORADOL) injection 15 mg (15 mg Intravenous Given 10/1/24 2219)   ipratropium-albuterol (DUO-NEB) nebulizer solution 3 mL (3 mL Nebulization Given 10/2/24 0140)     ECG/EMG Results (last 24 hours)       Procedure Component Value Units Date/Time    ECG 12 Lead Dyspnea [[id number]] Collected: 10/01/24 2205     Updated: 10/02/24 0036     QT Interval 352 ms      QTC Interval 418 ms     Narrative:      Test Reason : Dyspnea  Blood Pressure :   */*   mmHG  Vent. Rate :  85 BPM     Atrial Rate : 220 BPM     P-R Int :   * ms          QRS Dur : 132 ms      QT  Int : 352 ms       P-R-T Axes :   *  83 -15 degrees     QTc Int : 418 ms    Atrial fibrillation with premature ventricular or aberrantly conducted complexes  Nonspecific intraventricular block  T wave abnormality, consider inferior ischemia  T wave abnormality, consider anterior ischemia  Abnormal ECG  When compared with ECG of 01-SEP-2024 20:19,  T wave inversion no longer evident in Lateral leads  Confirmed by MD BULLARD KYLE ([id number]) on 10/2/2024 12:36:28 AM    Referred By: EDMD           Confirmed By: ROMÁN BULLARD MD          ECG 12 Lead Dyspnea   Final Result   Test Reason : Dyspnea   Blood Pressure :   */*   mmHG   Vent. Rate :  85 BPM     Atrial Rate : 220 BPM      P-R Int :   * ms          QRS Dur : 132 ms       QT Int : 352 ms       P-R-T Axes :   *  83 -15 degrees      QTc Int : 418 ms      Atrial fibrillation with premature ventricular or aberrantly conducted    complexes   Nonspecific intraventricular block   T wave abnormality, consider inferior ischemia   T wave abnormality, consider anterior ischemia   Abnormal ECG   When compared with ECG of 01-SEP-2024 20:19,   T wave inversion no longer evident in Lateral leads   Confirmed by MD BULLARD KYLE ([id number]) on 10/2/2024 12:36:28 AM      Referred By: EDMD           Confirmed By: ROMÁN BULLARD MD              No follow-up provider specified.       Medication List        New Prescriptions      Nirmatrelvir & Ritonavir (300mg/100mg)  Commonly known as: PAXLOVID  Take 3 tablets by mouth 2 (Two) Times a Day for 5 days.            Changed      * predniSONE 20 MG tablet  Commonly known as: DELTASONE  Take 3 tablets by mouth Daily for 5 days.  What changed: You were already taking a medication with the same name, and this prescription was added. Make sure you understand how and when to take each.     * predniSONE 10 MG tablet  Commonly known as: DELTASONE  What changed: Another medication with the same name was added. Make sure you understand how and when to take  each.           * This list has 2 medication(s) that are the same as other medications prescribed for you. Read the directions carefully, and ask your doctor or other care provider to review them with you.                   Where to Get Your Medications        These medications were sent to [facility] [id number] - [address] AT [address] [phone] [place] [id number] [address]      Phone: [phone]   Nirmatrelvir & Ritonavir (300mg/100mg)  predniSONE 20 MG tablet            Isma Armas MD  10/02/24 0257

## 2024-10-02 NOTE — DISCHARGE INSTRUCTIONS
Tonight you were diagnosed with a COVID-19 infection and will be prescribed antiviral medication Paxlovid for treatment of COVID-19 infection.  This medication has many interactions with medications.  You should adjust your medications according to the following:  Rivaroxaban (Xarelto) - do not take this medication for the duration of your paxlovid therapy.  Restart this medication 3 days after your finish the paxlovid.  This means for the next 8 days you will not take this medication.  All of your other medications take as prescribed.  Return to the ER as needed for any new or worsening symptoms

## 2024-10-05 ENCOUNTER — APPOINTMENT (OUTPATIENT)
Dept: GENERAL RADIOLOGY | Facility: HOSPITAL | Age: 74
DRG: 177 | End: 2024-10-05
Payer: MEDICARE

## 2024-10-05 ENCOUNTER — HOSPITAL ENCOUNTER (INPATIENT)
Facility: HOSPITAL | Age: 74
LOS: 5 days | Discharge: NURSING, REHAB OR LONG-TERM CARE | DRG: 177 | End: 2024-10-10
Attending: EMERGENCY MEDICINE | Admitting: STUDENT IN AN ORGANIZED HEALTH CARE EDUCATION/TRAINING PROGRAM
Payer: MEDICARE

## 2024-10-05 DIAGNOSIS — J96.00 ACUTE RESPIRATORY FAILURE, UNSPECIFIED WHETHER WITH HYPOXIA OR HYPERCAPNIA: ICD-10-CM

## 2024-10-05 DIAGNOSIS — U07.1 COVID-19: Primary | ICD-10-CM

## 2024-10-05 DIAGNOSIS — J18.9 COMMUNITY ACQUIRED PNEUMONIA, UNSPECIFIED LATERALITY: ICD-10-CM

## 2024-10-05 DIAGNOSIS — J44.1 COPD EXACERBATION: ICD-10-CM

## 2024-10-05 LAB
ALBUMIN SERPL-MCNC: 3.4 G/DL (ref 3.5–5.2)
ALBUMIN/GLOB SERPL: 1.3 G/DL
ALP SERPL-CCNC: 40 U/L (ref 39–117)
ALT SERPL W P-5'-P-CCNC: 31 U/L (ref 1–41)
ANION GAP SERPL CALCULATED.3IONS-SCNC: 7 MMOL/L (ref 5–15)
AST SERPL-CCNC: 23 U/L (ref 1–40)
BASOPHILS # BLD AUTO: 0 10*3/MM3 (ref 0–0.2)
BASOPHILS NFR BLD AUTO: 0 % (ref 0–1.5)
BILIRUB SERPL-MCNC: 0.6 MG/DL (ref 0–1.2)
BUN SERPL-MCNC: 83 MG/DL (ref 8–23)
BUN/CREAT SERPL: 69.7 (ref 7–25)
CALCIUM SPEC-SCNC: 8.8 MG/DL (ref 8.6–10.5)
CHLORIDE SERPL-SCNC: 102 MMOL/L (ref 98–107)
CO2 SERPL-SCNC: 30 MMOL/L (ref 22–29)
CREAT SERPL-MCNC: 1.19 MG/DL (ref 0.76–1.27)
CRP SERPL-MCNC: 4.58 MG/DL (ref 0–0.5)
D-LACTATE SERPL-SCNC: 1.1 MMOL/L (ref 0.5–2)
DEPRECATED RDW RBC AUTO: 55.5 FL (ref 37–54)
EGFRCR SERPLBLD CKD-EPI 2021: 63.7 ML/MIN/1.73
EOSINOPHIL # BLD AUTO: 0 10*3/MM3 (ref 0–0.4)
EOSINOPHIL NFR BLD AUTO: 0 % (ref 0.3–6.2)
ERYTHROCYTE [DISTWIDTH] IN BLOOD BY AUTOMATED COUNT: 14.6 % (ref 12.3–15.4)
ERYTHROCYTE [SEDIMENTATION RATE] IN BLOOD: 24 MM/HR (ref 0–20)
GEN 5 2HR TROPONIN T REFLEX: 82 NG/L
GLOBULIN UR ELPH-MCNC: 2.6 GM/DL
GLUCOSE BLDC GLUCOMTR-MCNC: 108 MG/DL (ref 70–130)
GLUCOSE BLDC GLUCOMTR-MCNC: 172 MG/DL (ref 70–130)
GLUCOSE SERPL-MCNC: 110 MG/DL (ref 65–99)
HCT VFR BLD AUTO: 34.7 % (ref 37.5–51)
HGB BLD-MCNC: 10.9 G/DL (ref 13–17.7)
HOLD SPECIMEN: NORMAL
IMM GRANULOCYTES # BLD AUTO: 0.06 10*3/MM3 (ref 0–0.05)
IMM GRANULOCYTES NFR BLD AUTO: 0.6 % (ref 0–0.5)
L PNEUMO1 AG UR QL IA: NEGATIVE
LDH SERPL-CCNC: 192 U/L (ref 135–225)
LYMPHOCYTES # BLD AUTO: 0.78 10*3/MM3 (ref 0.7–3.1)
LYMPHOCYTES NFR BLD AUTO: 8 % (ref 19.6–45.3)
MAGNESIUM SERPL-MCNC: 2.4 MG/DL (ref 1.6–2.4)
MCH RBC QN AUTO: 32.5 PG (ref 26.6–33)
MCHC RBC AUTO-ENTMCNC: 31.4 G/DL (ref 31.5–35.7)
MCV RBC AUTO: 103.6 FL (ref 79–97)
MONOCYTES # BLD AUTO: 0.98 10*3/MM3 (ref 0.1–0.9)
MONOCYTES NFR BLD AUTO: 10.1 % (ref 5–12)
NEUTROPHILS NFR BLD AUTO: 7.9 10*3/MM3 (ref 1.7–7)
NEUTROPHILS NFR BLD AUTO: 81.3 % (ref 42.7–76)
NRBC BLD AUTO-RTO: 0 /100 WBC (ref 0–0.2)
NT-PROBNP SERPL-MCNC: 2706 PG/ML (ref 0–1800)
PLATELET # BLD AUTO: 162 10*3/MM3 (ref 140–450)
PMV BLD AUTO: 9.3 FL (ref 6–12)
POTASSIUM SERPL-SCNC: 4.5 MMOL/L (ref 3.5–5.2)
PROCALCITONIN SERPL-MCNC: 0.51 NG/ML (ref 0–0.25)
PROT SERPL-MCNC: 6 G/DL (ref 6–8.5)
RBC # BLD AUTO: 3.35 10*6/MM3 (ref 4.14–5.8)
S PNEUM AG SPEC QL LA: NEGATIVE
SODIUM SERPL-SCNC: 139 MMOL/L (ref 136–145)
T4 FREE SERPL-MCNC: 1.37 NG/DL (ref 0.92–1.68)
TROPONIN T DELTA: -4 NG/L
TROPONIN T SERPL HS-MCNC: 86 NG/L
TSH SERPL DL<=0.05 MIU/L-ACNC: 1.22 UIU/ML (ref 0.27–4.2)
WBC NRBC COR # BLD AUTO: 9.72 10*3/MM3 (ref 3.4–10.8)
WHOLE BLOOD HOLD COAG: NORMAL
WHOLE BLOOD HOLD SPECIMEN: NORMAL

## 2024-10-05 PROCEDURE — 94799 UNLISTED PULMONARY SVC/PX: CPT

## 2024-10-05 PROCEDURE — 25010000002 CEFTRIAXONE PER 250 MG: Performed by: EMERGENCY MEDICINE

## 2024-10-05 PROCEDURE — 87449 NOS EACH ORGANISM AG IA: CPT | Performed by: STUDENT IN AN ORGANIZED HEALTH CARE EDUCATION/TRAINING PROGRAM

## 2024-10-05 PROCEDURE — 83880 ASSAY OF NATRIURETIC PEPTIDE: CPT | Performed by: EMERGENCY MEDICINE

## 2024-10-05 PROCEDURE — 87899 AGENT NOS ASSAY W/OPTIC: CPT | Performed by: STUDENT IN AN ORGANIZED HEALTH CARE EDUCATION/TRAINING PROGRAM

## 2024-10-05 PROCEDURE — 87040 BLOOD CULTURE FOR BACTERIA: CPT | Performed by: EMERGENCY MEDICINE

## 2024-10-05 PROCEDURE — 99285 EMERGENCY DEPT VISIT HI MDM: CPT

## 2024-10-05 PROCEDURE — 84443 ASSAY THYROID STIM HORMONE: CPT | Performed by: EMERGENCY MEDICINE

## 2024-10-05 PROCEDURE — 83615 LACTATE (LD) (LDH) ENZYME: CPT | Performed by: EMERGENCY MEDICINE

## 2024-10-05 PROCEDURE — 83605 ASSAY OF LACTIC ACID: CPT | Performed by: EMERGENCY MEDICINE

## 2024-10-05 PROCEDURE — 93005 ELECTROCARDIOGRAM TRACING: CPT | Performed by: EMERGENCY MEDICINE

## 2024-10-05 PROCEDURE — 83735 ASSAY OF MAGNESIUM: CPT | Performed by: EMERGENCY MEDICINE

## 2024-10-05 PROCEDURE — 25010000002 FUROSEMIDE PER 20 MG: Performed by: STUDENT IN AN ORGANIZED HEALTH CARE EDUCATION/TRAINING PROGRAM

## 2024-10-05 PROCEDURE — 80053 COMPREHEN METABOLIC PANEL: CPT | Performed by: EMERGENCY MEDICINE

## 2024-10-05 PROCEDURE — XW033E5 INTRODUCTION OF REMDESIVIR ANTI-INFECTIVE INTO PERIPHERAL VEIN, PERCUTANEOUS APPROACH, NEW TECHNOLOGY GROUP 5: ICD-10-PCS | Performed by: STUDENT IN AN ORGANIZED HEALTH CARE EDUCATION/TRAINING PROGRAM

## 2024-10-05 PROCEDURE — 84145 PROCALCITONIN (PCT): CPT | Performed by: EMERGENCY MEDICINE

## 2024-10-05 PROCEDURE — 86140 C-REACTIVE PROTEIN: CPT | Performed by: EMERGENCY MEDICINE

## 2024-10-05 PROCEDURE — 25010000002 DEXAMETHASONE PER 1 MG: Performed by: EMERGENCY MEDICINE

## 2024-10-05 PROCEDURE — 84484 ASSAY OF TROPONIN QUANT: CPT | Performed by: EMERGENCY MEDICINE

## 2024-10-05 PROCEDURE — 25010000002 REMDESIVIR 100 MG/20ML SOLUTION 1 EACH VIAL

## 2024-10-05 PROCEDURE — 99223 1ST HOSP IP/OBS HIGH 75: CPT | Performed by: STUDENT IN AN ORGANIZED HEALTH CARE EDUCATION/TRAINING PROGRAM

## 2024-10-05 PROCEDURE — 63710000001 INSULIN GLARGINE PER 5 UNITS: Performed by: STUDENT IN AN ORGANIZED HEALTH CARE EDUCATION/TRAINING PROGRAM

## 2024-10-05 PROCEDURE — 82948 REAGENT STRIP/BLOOD GLUCOSE: CPT

## 2024-10-05 PROCEDURE — 85652 RBC SED RATE AUTOMATED: CPT | Performed by: EMERGENCY MEDICINE

## 2024-10-05 PROCEDURE — 63710000001 INSULIN LISPRO (HUMAN) PER 5 UNITS: Performed by: STUDENT IN AN ORGANIZED HEALTH CARE EDUCATION/TRAINING PROGRAM

## 2024-10-05 PROCEDURE — 84439 ASSAY OF FREE THYROXINE: CPT | Performed by: EMERGENCY MEDICINE

## 2024-10-05 PROCEDURE — 71045 X-RAY EXAM CHEST 1 VIEW: CPT

## 2024-10-05 PROCEDURE — 25810000003 SODIUM CHLORIDE 0.9 % SOLUTION 250 ML FLEX CONT: Performed by: EMERGENCY MEDICINE

## 2024-10-05 PROCEDURE — 25810000003 SODIUM CHLORIDE 0.9 % SOLUTION 250 ML FLEX CONT

## 2024-10-05 PROCEDURE — 36415 COLL VENOUS BLD VENIPUNCTURE: CPT

## 2024-10-05 PROCEDURE — 25010000002 AZITHROMYCIN PER 500 MG: Performed by: EMERGENCY MEDICINE

## 2024-10-05 PROCEDURE — 85025 COMPLETE CBC W/AUTO DIFF WBC: CPT | Performed by: EMERGENCY MEDICINE

## 2024-10-05 PROCEDURE — 94640 AIRWAY INHALATION TREATMENT: CPT

## 2024-10-05 RX ORDER — LEVALBUTEROL INHALATION SOLUTION 1.25 MG/3ML
1 SOLUTION RESPIRATORY (INHALATION) EVERY 4 HOURS PRN
COMMUNITY

## 2024-10-05 RX ORDER — HYDRALAZINE HYDROCHLORIDE 100 MG/1
100 TABLET, FILM COATED ORAL DAILY
COMMUNITY

## 2024-10-05 RX ORDER — ALBUTEROL SULFATE 90 UG/1
2 INHALANT RESPIRATORY (INHALATION)
Status: DISCONTINUED | OUTPATIENT
Start: 2024-10-05 | End: 2024-10-10 | Stop reason: HOSPADM

## 2024-10-05 RX ORDER — MONTELUKAST SODIUM 4 MG/1
4 TABLET, CHEWABLE ORAL NIGHTLY
Status: DISCONTINUED | OUTPATIENT
Start: 2024-10-05 | End: 2024-10-05

## 2024-10-05 RX ORDER — MONTELUKAST SODIUM 5 MG/1
5 TABLET, CHEWABLE ORAL NIGHTLY
Status: DISCONTINUED | OUTPATIENT
Start: 2024-10-06 | End: 2024-10-10 | Stop reason: HOSPADM

## 2024-10-05 RX ORDER — IBUPROFEN 600 MG/1
1 TABLET ORAL
Status: DISCONTINUED | OUTPATIENT
Start: 2024-10-05 | End: 2024-10-10 | Stop reason: HOSPADM

## 2024-10-05 RX ORDER — ALBUTEROL SULFATE 90 UG/1
2 INHALANT RESPIRATORY (INHALATION) ONCE
Status: COMPLETED | OUTPATIENT
Start: 2024-10-05 | End: 2024-10-05

## 2024-10-05 RX ORDER — INSULIN LISPRO 100 [IU]/ML
2-7 INJECTION, SOLUTION INTRAVENOUS; SUBCUTANEOUS
Status: DISCONTINUED | OUTPATIENT
Start: 2024-10-05 | End: 2024-10-09

## 2024-10-05 RX ORDER — LISINOPRIL 20 MG/1
20 TABLET ORAL
Status: DISCONTINUED | OUTPATIENT
Start: 2024-10-05 | End: 2024-10-10 | Stop reason: HOSPADM

## 2024-10-05 RX ORDER — INSULIN GLARGINE 100 [IU]/ML
17 INJECTION, SOLUTION SUBCUTANEOUS NIGHTLY
COMMUNITY

## 2024-10-05 RX ORDER — POLYETHYLENE GLYCOL 3350 17 G/17G
17 POWDER, FOR SOLUTION ORAL DAILY PRN
Status: DISCONTINUED | OUTPATIENT
Start: 2024-10-05 | End: 2024-10-10 | Stop reason: HOSPADM

## 2024-10-05 RX ORDER — DILTIAZEM HYDROCHLORIDE 180 MG/1
360 CAPSULE, COATED, EXTENDED RELEASE ORAL DAILY
Status: DISCONTINUED | OUTPATIENT
Start: 2024-10-05 | End: 2024-10-10 | Stop reason: HOSPADM

## 2024-10-05 RX ORDER — NITROGLYCERIN 0.4 MG/1
0.4 TABLET SUBLINGUAL
Status: DISCONTINUED | OUTPATIENT
Start: 2024-10-05 | End: 2024-10-10 | Stop reason: HOSPADM

## 2024-10-05 RX ORDER — ACETAMINOPHEN 500 MG
500 TABLET ORAL EVERY 6 HOURS PRN
COMMUNITY

## 2024-10-05 RX ORDER — NICOTINE POLACRILEX 4 MG
15 LOZENGE BUCCAL
Status: DISCONTINUED | OUTPATIENT
Start: 2024-10-05 | End: 2024-10-10 | Stop reason: HOSPADM

## 2024-10-05 RX ORDER — DEXAMETHASONE 4 MG/1
6 TABLET ORAL
Status: DISCONTINUED | OUTPATIENT
Start: 2024-10-06 | End: 2024-10-10 | Stop reason: HOSPADM

## 2024-10-05 RX ORDER — GUAIFENESIN 600 MG/1
1200 TABLET, EXTENDED RELEASE ORAL EVERY 12 HOURS SCHEDULED
Status: DISCONTINUED | OUTPATIENT
Start: 2024-10-05 | End: 2024-10-10 | Stop reason: HOSPADM

## 2024-10-05 RX ORDER — SPIRONOLACTONE 25 MG/1
25 TABLET ORAL DAILY
Status: DISCONTINUED | OUTPATIENT
Start: 2024-10-05 | End: 2024-10-10 | Stop reason: HOSPADM

## 2024-10-05 RX ORDER — ALBUTEROL SULFATE 90 UG/1
1 INHALANT RESPIRATORY (INHALATION) EVERY 4 HOURS PRN
COMMUNITY

## 2024-10-05 RX ORDER — HYDRALAZINE HYDROCHLORIDE 50 MG/1
100 TABLET, FILM COATED ORAL DAILY
Status: DISCONTINUED | OUTPATIENT
Start: 2024-10-05 | End: 2024-10-10 | Stop reason: HOSPADM

## 2024-10-05 RX ORDER — TERAZOSIN 5 MG/1
10 CAPSULE ORAL NIGHTLY
Status: DISCONTINUED | OUTPATIENT
Start: 2024-10-05 | End: 2024-10-10 | Stop reason: HOSPADM

## 2024-10-05 RX ORDER — IPRATROPIUM BROMIDE AND ALBUTEROL SULFATE 2.5; .5 MG/3ML; MG/3ML
3 SOLUTION RESPIRATORY (INHALATION)
Status: DISCONTINUED | OUTPATIENT
Start: 2024-10-05 | End: 2024-10-05

## 2024-10-05 RX ORDER — DILTIAZEM HYDROCHLORIDE 180 MG/1
360 CAPSULE, COATED, EXTENDED RELEASE ORAL DAILY
COMMUNITY

## 2024-10-05 RX ORDER — BISACODYL 10 MG
10 SUPPOSITORY, RECTAL RECTAL DAILY PRN
Status: DISCONTINUED | OUTPATIENT
Start: 2024-10-05 | End: 2024-10-10 | Stop reason: HOSPADM

## 2024-10-05 RX ORDER — PREDNISONE 10 MG/1
10 TABLET ORAL DAILY
Status: DISCONTINUED | OUTPATIENT
Start: 2024-10-05 | End: 2024-10-10 | Stop reason: HOSPADM

## 2024-10-05 RX ORDER — SODIUM CHLORIDE 0.9 % (FLUSH) 0.9 %
10 SYRINGE (ML) INJECTION AS NEEDED
Status: DISCONTINUED | OUTPATIENT
Start: 2024-10-05 | End: 2024-10-10 | Stop reason: HOSPADM

## 2024-10-05 RX ORDER — DEXAMETHASONE SODIUM PHOSPHATE 10 MG/ML
6 INJECTION INTRAMUSCULAR; INTRAVENOUS ONCE
Status: COMPLETED | OUTPATIENT
Start: 2024-10-05 | End: 2024-10-05

## 2024-10-05 RX ORDER — AZITHROMYCIN 250 MG/1
500 TABLET, FILM COATED ORAL
Status: COMPLETED | OUTPATIENT
Start: 2024-10-06 | End: 2024-10-07

## 2024-10-05 RX ORDER — DEXTROSE MONOHYDRATE 25 G/50ML
25 INJECTION, SOLUTION INTRAVENOUS
Status: DISCONTINUED | OUTPATIENT
Start: 2024-10-05 | End: 2024-10-10 | Stop reason: HOSPADM

## 2024-10-05 RX ORDER — INSULIN GLARGINE 100 [IU]/ML
5 INJECTION, SOLUTION SUBCUTANEOUS NIGHTLY
Status: DISCONTINUED | OUTPATIENT
Start: 2024-10-05 | End: 2024-10-08

## 2024-10-05 RX ORDER — ALBUTEROL SULFATE 90 UG/1
2 INHALANT RESPIRATORY (INHALATION)
Status: DISCONTINUED | OUTPATIENT
Start: 2024-10-05 | End: 2024-10-05

## 2024-10-05 RX ORDER — FLUTICASONE PROPIONATE AND SALMETEROL 250; 50 UG/1; UG/1
2 POWDER RESPIRATORY (INHALATION)
COMMUNITY

## 2024-10-05 RX ORDER — BENZONATATE 100 MG/1
100 CAPSULE ORAL 3 TIMES DAILY PRN
Status: DISCONTINUED | OUTPATIENT
Start: 2024-10-05 | End: 2024-10-10 | Stop reason: HOSPADM

## 2024-10-05 RX ORDER — LISINOPRIL AND HYDROCHLOROTHIAZIDE 20; 25 MG/1; MG/1
1 TABLET ORAL DAILY
COMMUNITY

## 2024-10-05 RX ORDER — BISACODYL 5 MG/1
5 TABLET, DELAYED RELEASE ORAL DAILY PRN
Status: DISCONTINUED | OUTPATIENT
Start: 2024-10-05 | End: 2024-10-10 | Stop reason: HOSPADM

## 2024-10-05 RX ORDER — TIOTROPIUM BROMIDE 18 UG/1
1 CAPSULE ORAL; RESPIRATORY (INHALATION)
COMMUNITY

## 2024-10-05 RX ORDER — ALBUTEROL SULFATE 90 UG/1
2 INHALANT RESPIRATORY (INHALATION) EVERY 4 HOURS PRN
Status: DISCONTINUED | OUTPATIENT
Start: 2024-10-05 | End: 2024-10-10 | Stop reason: HOSPADM

## 2024-10-05 RX ORDER — FUROSEMIDE 10 MG/ML
40 INJECTION INTRAMUSCULAR; INTRAVENOUS ONCE
Status: COMPLETED | OUTPATIENT
Start: 2024-10-05 | End: 2024-10-05

## 2024-10-05 RX ORDER — BUDESONIDE AND FORMOTEROL FUMARATE DIHYDRATE 160; 4.5 UG/1; UG/1
2 AEROSOL RESPIRATORY (INHALATION)
Status: DISCONTINUED | OUTPATIENT
Start: 2024-10-05 | End: 2024-10-10 | Stop reason: HOSPADM

## 2024-10-05 RX ORDER — CLOTRIMAZOLE 10 MG/1
10 LOZENGE ORAL
Status: DISCONTINUED | OUTPATIENT
Start: 2024-10-05 | End: 2024-10-10 | Stop reason: HOSPADM

## 2024-10-05 RX ORDER — AMOXICILLIN 250 MG
2 CAPSULE ORAL 2 TIMES DAILY
Status: DISCONTINUED | OUTPATIENT
Start: 2024-10-05 | End: 2024-10-10 | Stop reason: HOSPADM

## 2024-10-05 RX ORDER — L.ACID,PARA/B.BIFIDUM/S.THERM 8B CELL
1 CAPSULE ORAL DAILY
Status: DISCONTINUED | OUTPATIENT
Start: 2024-10-05 | End: 2024-10-10 | Stop reason: HOSPADM

## 2024-10-05 RX ORDER — HYDROCHLOROTHIAZIDE 25 MG/1
25 TABLET ORAL
Status: DISCONTINUED | OUTPATIENT
Start: 2024-10-05 | End: 2024-10-10 | Stop reason: HOSPADM

## 2024-10-05 RX ADMIN — CLOTRIMAZOLE 10 MG: 10 LOZENGE ORAL at 23:18

## 2024-10-05 RX ADMIN — SODIUM CHLORIDE 2000 MG: 900 INJECTION INTRAVENOUS at 11:59

## 2024-10-05 RX ADMIN — ALBUTEROL SULFATE 2 PUFF: 90 AEROSOL, METERED RESPIRATORY (INHALATION) at 16:38

## 2024-10-05 RX ADMIN — BUDESONIDE AND FORMOTEROL FUMARATE DIHYDRATE 2 PUFF: 160; 4.5 AEROSOL RESPIRATORY (INHALATION) at 16:38

## 2024-10-05 RX ADMIN — INSULIN LISPRO 2 UNITS: 100 INJECTION, SOLUTION INTRAVENOUS; SUBCUTANEOUS at 20:36

## 2024-10-05 RX ADMIN — GUAIFENESIN 1200 MG: 600 TABLET, EXTENDED RELEASE ORAL at 16:05

## 2024-10-05 RX ADMIN — DEXAMETHASONE SODIUM PHOSPHATE 6 MG: 10 INJECTION INTRAMUSCULAR; INTRAVENOUS at 11:41

## 2024-10-05 RX ADMIN — TIOTROPIUM BROMIDE INHALATION SPRAY 2 PUFF: 3.12 SPRAY, METERED RESPIRATORY (INHALATION) at 16:38

## 2024-10-05 RX ADMIN — REMDESIVIR 200 MG: 100 INJECTION, POWDER, LYOPHILIZED, FOR SOLUTION INTRAVENOUS at 17:19

## 2024-10-05 RX ADMIN — SENNOSIDES AND DOCUSATE SODIUM 2 TABLET: 50; 8.6 TABLET ORAL at 20:36

## 2024-10-05 RX ADMIN — INSULIN GLARGINE 5 UNITS: 100 INJECTION, SOLUTION SUBCUTANEOUS at 20:36

## 2024-10-05 RX ADMIN — ALBUTEROL SULFATE 2 PUFF: 90 AEROSOL, METERED RESPIRATORY (INHALATION) at 11:10

## 2024-10-05 RX ADMIN — TERAZOSIN HYDROCHLORIDE 10 MG: 5 CAPSULE ORAL at 20:36

## 2024-10-05 RX ADMIN — RIVAROXABAN 20 MG: 20 TABLET, FILM COATED ORAL at 17:22

## 2024-10-05 RX ADMIN — ALBUTEROL SULFATE 2 PUFF: 90 AEROSOL, METERED RESPIRATORY (INHALATION) at 20:23

## 2024-10-05 RX ADMIN — AZITHROMYCIN DIHYDRATE 500 MG: 500 INJECTION, POWDER, LYOPHILIZED, FOR SOLUTION INTRAVENOUS at 11:58

## 2024-10-05 RX ADMIN — FUROSEMIDE 40 MG: 10 INJECTION, SOLUTION INTRAMUSCULAR; INTRAVENOUS at 16:05

## 2024-10-05 RX ADMIN — Medication 1 CAPSULE: at 16:05

## 2024-10-05 NOTE — Clinical Note
Level of Care: Telemetry [5]   Diagnosis: COVID [[id number]]   Admitting Physician: JOSE FREEMAN [[id number]]   Attending Physician: JOSE FREEMAN [[id number]]   Isolate for COVID?: Yes [1]   Certification: I Certify That Inpatient Hospital Services Are Medically Necessary For Greater Than 2 Midnights

## 2024-10-05 NOTE — ED PROVIDER NOTES
Subjective   History of Present Illness  74-year-old male presents for evaluation of generalized weakness and shortness of breath.  Of note, the patient was seen here in the emergency department on October 1 at which time he was diagnosed with COVID-19.  I did a thorough review of his records.  He tells me that he has been symptomatic now for about 6 days.  He was noted to be hypoxic during his emergency department visit 3 days ago.  He wears home oxygen as needed at night.  The emergency physician that evaluated the patient 3 days ago discussed inpatient versus outpatient management with the patient, and using shared decision making, they elected for the latter option with strict return precautions.  The patient tells me that over the past 2 days he has had progressively worsening generalized weakness when compared to baseline.  He also notes a poor appetite and worsening shortness of breath, especially with exertion.  He continues to experience a productive cough.  As a result of his ongoing symptoms, he felt that he would likely need to be hospitalized and as a result came here to the ED to be evaluated.      Review of Systems   Constitutional:  Positive for appetite change and fatigue.   Respiratory:  Positive for cough, shortness of breath and wheezing.    Neurological:  Positive for weakness.   All other systems reviewed and are negative.      Past Medical History:   Diagnosis Date    Afib     COPD (chronic obstructive pulmonary disease)        Allergies   Allergen Reactions    Tetanus Toxoids Unknown - Low Severity       Past Surgical History:   Procedure Laterality Date    COLON RESECTION      NASAL POLYP EXCISION         Family History   Problem Relation Age of Onset    No Known Problems Mother     No Known Problems Father        Social History     Socioeconomic History    Marital status:    Tobacco Use    Smoking status: Never   Substance and Sexual Activity    Alcohol use: Never    Drug use: Never            Objective   Physical Exam  Vitals and nursing note reviewed.   Constitutional:       General: He is not in acute distress.     Appearance: He is well-developed. He is not diaphoretic.      Comments: Nontoxic-appearing male   HENT:      Head: Normocephalic and atraumatic.   Neck:      Vascular: No JVD.   Cardiovascular:      Rate and Rhythm: Normal rate and regular rhythm.      Heart sounds: Normal heart sounds. No murmur heard.     No friction rub. No gallop.   Pulmonary:      Breath sounds: Wheezing present. No rales.      Comments: Audible expiratory wheezes noted throughout posterior lung fields, no accessory muscle use or retractions noted  Abdominal:      General: Bowel sounds are normal. There is no distension.      Palpations: Abdomen is soft. There is no mass.      Tenderness: There is no abdominal tenderness. There is no guarding.   Musculoskeletal:         General: Normal range of motion.      Cervical back: Normal range of motion.      Right lower leg: No edema.      Left lower leg: No edema.   Skin:     General: Skin is warm and dry.      Coloration: Skin is not pale.      Findings: No erythema or rash.   Neurological:      Mental Status: He is alert and oriented to person, place, and time.   Psychiatric:         Mood and Affect: Mood normal.         Thought Content: Thought content normal.         Judgment: Judgment normal.         Procedures           ED Course  ED Course as of 10/05/24 1331   Sat Oct 05, 2024   1101 74-year-old male presents for evaluation of generalized weakness and shortness of breath.  Of note, the patient was seen here in the emergency department on October 1 at which time he was diagnosed with COVID-19.  I did a thorough review of his records.  He tells me that he has been symptomatic now for about 6 days.  He was noted to be hypoxic during his emergency department visit 3 days ago.  The physician taking care of him discussed inpatient versus outpatient management,  using shared decision making, they elected for the latter option with strict return precautions.  The patient wears home oxygen as needed at night.  He tells me that over the past 2 days he has had progressively worsening generalized weakness, poor appetite, and worsening shortness of breath, especially with exertion.  As a result of his ongoing symptoms, he felt that he would likely need to be hospitalized so he called the EMS this morning and was brought back here for another opinion.  He is anticoagulated and endorses compliance with his DOAC.  On arrival to the ED, the patient is hypoxic on room air with oxygen saturations of 83%.  He has audible wheezes noted on exam.  Nebs and steroids given for symptom relief.  Broad differential diagnosis.  We will obtain labs and imaging, and we will seek admission to the hospital pending return of workup. [DD]   1207 I personally and independently viewed the patient's x-ray images myself, and I am in agreement with the radiologist's reading for final interpretation, particularly regarding worsening multifocal pneumonia. [DD]   1207 CAP coverage initiated.  The patient clearly warrants admission to the hospital at this point.  He is agreeable to admission.  I discussed the patient's case with our hospitalist, Dr. Colin, and the patient will be admitted under her care for further evaluation and treatment.  The patient is hemodynamically stable at this time and is aware/agreeable with the plan. [DD]   1212 Inflammatory markers are elevated. [DD]   1212 The patient is currently on 3 and half liters of oxygen. [DD]      ED Course User Index  [DD] Mateus Waldron MD                                        Recent Results (from the past 24 hour(s))   ECG 12 Lead ED Triage Standing Order; SOA    Collection Time: 10/05/24 11:00 AM   Result Value Ref Range    QT Interval 384 ms    QTC Interval 456 ms   Comprehensive Metabolic Panel    Collection Time: 10/05/24 11:29 AM     Specimen: Blood   Result Value Ref Range    Glucose 110 (H) 65 - 99 mg/dL    BUN 83 (H) 8 - 23 mg/dL    Creatinine 1.19 0.76 - 1.27 mg/dL    Sodium 139 136 - 145 mmol/L    Potassium 4.5 3.5 - 5.2 mmol/L    Chloride 102 98 - 107 mmol/L    CO2 30.0 (H) 22.0 - 29.0 mmol/L    Calcium 8.8 8.6 - 10.5 mg/dL    Total Protein 6.0 6.0 - 8.5 g/dL    Albumin 3.4 (L) 3.5 - 5.2 g/dL    ALT (SGPT) 31 1 - 41 U/L    AST (SGOT) 23 1 - 40 U/L    Alkaline Phosphatase 40 39 - 117 U/L    Total Bilirubin 0.6 0.0 - 1.2 mg/dL    Globulin 2.6 gm/dL    A/G Ratio 1.3 g/dL    BUN/Creatinine Ratio 69.7 (H) 7.0 - 25.0    Anion Gap 7.0 5.0 - 15.0 mmol/L    eGFR 63.7 >60.0 mL/min/1.73   BNP    Collection Time: 10/05/24 11:29 AM    Specimen: Blood   Result Value Ref Range    proBNP 2,706.0 (H) 0.0 - 1,800.0 pg/mL   Single High Sensitivity Troponin T    Collection Time: 10/05/24 11:29 AM    Specimen: Blood   Result Value Ref Range    HS Troponin T 86 (C) <22 ng/L   Green Top (Gel)    Collection Time: 10/05/24 11:29 AM   Result Value Ref Range    Extra Tube Hold for add-ons.    Lavender Top    Collection Time: 10/05/24 11:29 AM   Result Value Ref Range    Extra Tube hold for add-on    Gold Top - SST    Collection Time: 10/05/24 11:29 AM   Result Value Ref Range    Extra Tube Hold for add-ons.    Gray Top    Collection Time: 10/05/24 11:29 AM   Result Value Ref Range    Extra Tube Hold for add-ons.    Light Blue Top    Collection Time: 10/05/24 11:29 AM   Result Value Ref Range    Extra Tube Hold for add-ons.    CBC Auto Differential    Collection Time: 10/05/24 11:29 AM    Specimen: Blood   Result Value Ref Range    WBC 9.72 3.40 - 10.80 10*3/mm3    RBC 3.35 (L) 4.14 - 5.80 10*6/mm3    Hemoglobin 10.9 (L) 13.0 - 17.7 g/dL    Hematocrit 34.7 (L) 37.5 - 51.0 %    .6 (H) 79.0 - 97.0 fL    MCH 32.5 26.6 - 33.0 pg    MCHC 31.4 (L) 31.5 - 35.7 g/dL    RDW 14.6 12.3 - 15.4 %    RDW-SD 55.5 (H) 37.0 - 54.0 fl    MPV 9.3 6.0 - 12.0 fL    Platelets 162  140 - 450 10*3/mm3    Neutrophil % 81.3 (H) 42.7 - 76.0 %    Lymphocyte % 8.0 (L) 19.6 - 45.3 %    Monocyte % 10.1 5.0 - 12.0 %    Eosinophil % 0.0 (L) 0.3 - 6.2 %    Basophil % 0.0 0.0 - 1.5 %    Immature Grans % 0.6 (H) 0.0 - 0.5 %    Neutrophils, Absolute 7.90 (H) 1.70 - 7.00 10*3/mm3    Lymphocytes, Absolute 0.78 0.70 - 3.10 10*3/mm3    Monocytes, Absolute 0.98 (H) 0.10 - 0.90 10*3/mm3    Eosinophils, Absolute 0.00 0.00 - 0.40 10*3/mm3    Basophils, Absolute 0.00 0.00 - 0.20 10*3/mm3    Immature Grans, Absolute 0.06 (H) 0.00 - 0.05 10*3/mm3    nRBC 0.0 0.0 - 0.2 /100 WBC   Lactic Acid, Plasma    Collection Time: 10/05/24 11:29 AM    Specimen: Blood   Result Value Ref Range    Lactate 1.1 0.5 - 2.0 mmol/L   Procalcitonin    Collection Time: 10/05/24 11:29 AM    Specimen: Blood   Result Value Ref Range    Procalcitonin 0.51 (H) 0.00 - 0.25 ng/mL   Sedimentation Rate    Collection Time: 10/05/24 11:29 AM    Specimen: Blood   Result Value Ref Range    Sed Rate 24 (H) 0 - 20 mm/hr   C-reactive Protein    Collection Time: 10/05/24 11:29 AM    Specimen: Blood   Result Value Ref Range    C-Reactive Protein 4.58 (H) 0.00 - 0.50 mg/dL   Lactate Dehydrogenase    Collection Time: 10/05/24 11:29 AM    Specimen: Blood   Result Value Ref Range     135 - 225 U/L   Magnesium    Collection Time: 10/05/24 11:29 AM    Specimen: Blood   Result Value Ref Range    Magnesium 2.4 1.6 - 2.4 mg/dL   T4, Free    Collection Time: 10/05/24 11:29 AM    Specimen: Blood   Result Value Ref Range    Free T4 1.37 0.92 - 1.68 ng/dL   TSH    Collection Time: 10/05/24 11:29 AM    Specimen: Blood   Result Value Ref Range    TSH 1.220 0.270 - 4.200 uIU/mL     Note: In addition to lab results from this visit, the labs listed above may include labs taken at another facility or during a different encounter within the last 24 hours. Please correlate lab times with ED admission and discharge times for further clarification of the services performed  during this visit.    XR Chest 1 View   Final Result   Impression:   Worsening infiltrate within the left lower lobe. Additional infiltrates are seen throughout the lungs bilaterally with scattered interstitial changes. There is also a small left pleural effusion. The findings suggest progressing atypical/viral infection    or multifocal pneumonia versus worsening pulmonary edema.         Electronically Signed: Andrea Vallejo MD     10/5/2024 11:23 AM EDT     Workstation ID: [id number]        Vitals:    10/05/24 1117 10/05/24 1130 10/05/24 1200 10/05/24 1230   BP: 138/69 138/70 138/71    BP Location:       Patient Position: Sitting      Pulse: 80 80 84 95   Resp: 18      Temp: 97.8 °F (36.6 °C)      SpO2: 95% 94% 93% 91%   Weight:       Height:         Medications   sodium chloride 0.9 % flush 10 mL (has no administration in time range)   dexAMETHasone (DECADRON) injection 6 mg (6 mg Intravenous Given 10/5/24 1141)   albuterol sulfate HFA (PROVENTIL HFA;VENTOLIN HFA;PROAIR HFA) inhaler 2 puff (2 puffs Inhalation Given 10/5/24 1110)   cefTRIAXone (ROCEPHIN) 2,000 mg in sodium chloride 0.9 % 100 mL MBP (0 mg Intravenous Stopped 10/5/24 1235)   azithromycin (ZITHROMAX) 500 mg in sodium chloride 0.9 % 250 mL IVPB-VTB (500 mg Intravenous New Bag 10/5/24 1158)     ECG/EMG Results (last 24 hours)       Procedure Component Value Units Date/Time    ECG 12 Lead ED Triage Standing Order; SOA [[id number]] Collected: 10/05/24 1100     Updated: 10/05/24 1100     QT Interval 384 ms      QTC Interval 456 ms     Narrative:      Test Reason : ED Triage Standing Order~  Blood Pressure :   */*   mmHG  Vent. Rate :  85 BPM     Atrial Rate :  59 BPM     P-R Int :   * ms          QRS Dur : 138 ms      QT Int : 384 ms       P-R-T Axes :   *  79 -19 degrees     QTc Int : 456 ms    Atrial fibrillation with a competing junctional pacemaker with premature ventricular or aberrantly conducted complexes  Right bundle branch block  Septal infarct ,  age undetermined  T wave abnormality, consider inferior ischemia  Abnormal ECG  When compared with ECG of 01-OCT-2024 22:05,  No significant change was found    Referred By: edmd           Confirmed By:           ECG 12 Lead ED Triage Standing Order; SOA   Preliminary Result   Test Reason : ED Triage Standing Order~   Blood Pressure :   */*   mmHG   Vent. Rate :  85 BPM     Atrial Rate :  59 BPM      P-R Int :   * ms          QRS Dur : 138 ms       QT Int : 384 ms       P-R-T Axes :   *  79 -19 degrees      QTc Int : 456 ms      Atrial fibrillation with a competing junctional pacemaker with premature    ventricular or aberrantly conducted complexes   Right bundle branch block   Septal infarct , age undetermined   T wave abnormality, consider inferior ischemia   Abnormal ECG   When compared with ECG of 01-OCT-2024 22:05,   No significant change was found      Referred By: edmd           Confirmed By:                  Medical Decision Making  Amount and/or Complexity of Data Reviewed  Labs: ordered.  Radiology: ordered.  ECG/medicine tests: ordered.    Risk  Prescription drug management.  Decision regarding hospitalization.        Final diagnoses:   COVID-19   Acute respiratory failure, unspecified whether with hypoxia or hypercapnia   COPD exacerbation   Community acquired pneumonia, unspecified laterality       ED Disposition  ED Disposition       ED Disposition   Decision to Admit    Condition   --    Comment   Level of Care: Telemetry [5]   Diagnosis: COVID [[id number]]   Admitting Physician: JOSE FREEMAN [[id number]]   Attending Physician: JOSE FREEMAN [[id number]]                 No follow-up provider specified.       Medication List      No changes were made to your prescriptions during this visit.            Mateus Waldron MD  10/05/24 9367

## 2024-10-05 NOTE — H&P
[facility] Medicine Services  HISTORY AND PHYSICAL    Patient Name: Jonatan Mcguire  : 1950  MRN: [id number]  Primary Care Physician: Provider, No Known  Date of admission: 10/5/2024    Subjective   Subjective     Chief Complaint:  Shortness of breath    HPI:  Jonatan Mcguire is a 74 y.o. male With history of CKD, vitamin D deficiency, A-fib, asthma-COPD overlap syndrome, chronic hypoxemic respiratory failure (on 2 L nasal cannula during exertion and nocturnally), HTN, obesity, NEW on CPAP, HLD, DM2, who presented for evaluation of shortness of breath.  Patient began having cough, congestion, shortness of breath, wheezing on .  Other associated symptoms include fatigue and feeling like he cannot take a deep breath.  Also noted fever to 101 and constipation.  Denied chest pain, headache, visual changes, nausea, vomiting, diarrhea, or urinary symptoms.  He was seen in the ER on 10/2 and diagnosed with COVID.  He declined admission at that time.  However, he continued to feel poorly and have hypoxia, so he presented again for further evaluation.    In the ER, patient was afebrile, heart rate 89, blood pressure 130/68, satting 83% on room air.  Placed on 4 L nasal cannula and oxygenation improved to 95%.  Labs notable for BUN 83, creatinine 1.19, proBNP 2006, high-sensitivity troponin 86, WBC 9, hemoglobin 10.9, lactic 1.1, procalcitonin 0.51, COVID-positive on 10/2.  Chest x-ray with worsening infiltrate in the left lower lobe and bilateral infiltrates as well.  Patient was given ceftriaxone, azithromycin, dexamethasone, albuterol.  He was admitted for further management.        Review of Systems   As above          Personal History     Past Medical History:   Diagnosis Date    Afib     COPD (chronic obstructive pulmonary disease)     Diabetes mellitus, type II     Hypertension     Obesity     Sleep apnea     Vitamin D deficiency              Past Surgical History:   Procedure  Laterality Date    COLON RESECTION      NASAL POLYP EXCISION         Family History: family history includes No Known Problems in his father and mother.     Social History:  reports that he has never smoked. He does not have any smokeless tobacco history on file. He reports that he does not drink alcohol and does not use drugs.  Social History     Social History Narrative    Not on file       Medications:  Fluticasone-Salmeterol, Prenatal 27-1, acetaminophen, albuterol sulfate HFA, dilTIAZem CD, doxazosin, glipizide, hydrALAZINE, insulin glargine, levalbuterol, lisinopril-hydrochlorothiazide, montelukast, predniSONE, rivaroxaban, spironolactone, and tiotropium    Allergies   Allergen Reactions    Tetanus Toxoids Unknown - Low Severity       Objective   Objective     Vital Signs:   Temp:  [97.8 °F (36.6 °C)] 97.8 °F (36.6 °C)  Heart Rate:  [73-95] 95  Resp:  [18-22] 18  BP: (130-138)/(68-71) 138/71  Flow (L/min):  [4] 4    Physical Exam   Constitutional: No acute distress, awake, alert  HENT: NCAT, mucous membranes moist, white plaques in mouth  Respiratory: Rales in the right base, scattered expiratory wheeze, respiratory effort normal   Cardiovascular: IRIR, HR 90s  Gastrointestinal: Positive bowel sounds, soft, nontender, nondistended  Musculoskeletal: bilateral ankle edema  Psychiatric: Appropriate affect, cooperative  Neurologic: Alert, oriented, symmetric facies, symmetric palate rise, tongue midline, moves all extremities, speech clear  Skin: B/I lichenification of lower extremities      Result Review:  I have personally reviewed the results from the time of this admission to 10/5/2024 14:07 EDT and agree with these findings:  []  Laboratory list / accordion  []  Microbiology  []  Radiology  []  EKG/Telemetry   []  Cardiology/Vascular   []  Pathology  []  Old records  []  Other:  Most notable findings include:     LAB RESULTS:      Lab 10/05/24  1129 10/01/24  2201   WBC 9.72 12.46*   HEMOGLOBIN 10.9* 11.9*    HEMATOCRIT 34.7* 37.6   PLATELETS 162 136*   NEUTROS ABS 7.90* 9.56*   IMMATURE GRANS (ABS) 0.06* 0.06*   LYMPHS ABS 0.78 1.96   MONOS ABS 0.98* 0.86   EOS ABS 0.00 0.00   .6* 103.3*   SED RATE 24*  --    CRP 4.58*  --    PROCALCITONIN 0.51*  --    LACTATE 1.1 0.8     --          Lab 10/05/24  1129 10/01/24  2201   SODIUM 139 141   POTASSIUM 4.5 4.4   CHLORIDE 102 102   CO2 30.0* 30.0*   ANION GAP 7.0 9.0   BUN 83* 24*   CREATININE 1.19 1.04   EGFR 63.7 74.9   GLUCOSE 110* 88   CALCIUM 8.8 8.6   MAGNESIUM 2.4  --    TSH 1.220  --          Lab 10/05/24  1129 10/01/24  2201   TOTAL PROTEIN 6.0 6.4   ALBUMIN 3.4* 3.5   GLOBULIN 2.6 2.9   ALT (SGPT) 31 32   AST (SGOT) 23 37   BILIRUBIN 0.6 0.9   ALK PHOS 40 52         Lab 10/05/24  1129   PROBNP 2,706.0*   HSTROP T 86*                 Lab 10/01/24  2206   FIO2 28   CARBOXYHEMOGLOBIN (VENOUS) 2.2     Brief Urine Lab Results       None          Microbiology Results (last 10 days)       Procedure Component Value - Date/Time    COVID PRE-OP / PRE-PROCEDURE SCREENING ORDER (NO ISOLATION) - Swab, Nasopharynx [[id number]]  (Abnormal) Collected: 10/02/24 0004    Lab Status: Final result Specimen: Swab from Nasopharynx Updated: 10/02/24 0238    Narrative:      The following orders were created for panel order COVID PRE-OP / PRE-PROCEDURE SCREENING ORDER (NO ISOLATION) - Swab, Nasopharynx.  Procedure                               Abnormality         Status                     ---------                               -----------         ------                     COVID-19 and FLU A/B PCR...[[id number]]  Abnormal            Final result                 Please view results for these tests on the individual orders.    COVID-19 and FLU A/B PCR, 1 HR TAT - Swab, Nasopharynx [[id number]]  (Abnormal) Collected: 10/02/24 0004    Lab Status: Final result Specimen: Swab from Nasopharynx Updated: 10/02/24 0238     COVID19 Detected     Influenza A PCR Not Detected     Influenza B  PCR Not Detected    Narrative:      Fact sheet for providers: https://www.fda.gov/media/774792/download    Fact sheet for patients: https://www.fda.gov/media/382507/download    Test performed by PCR.  Influenza A and Influenza B negative results should be considered presumptive in samples that have a positive SARS-CoV-2 result.    Competitive inhibition studies showed that SARS-CoV-2 virus, when present at concentrations above 3.6E+04 copies/mL, can inhibit the detection and amplification of influenza A and influenza B virus RNA if present at or below 1.8E+02 copies/mL or 4.9E+02 copies/mL, respectively, and may lead to false negative influenza virus results. If co-infection with influenza A or influenza B virus is suspected in samples with a positive SARS-CoV-2 result, the sample should be re-tested with another FDA cleared, approved, or authorized influenza test, if influenza virus detection would change clinical management.            XR Chest 1 View    Result Date: 10/5/2024  XR CHEST 1 VW Date of Exam: 10/5/2024 11:16 AM EDT Indication: SOA triage protocol Comparison: 10/1/2024 at 2205 hours Findings: Worsening infiltrate is noted within the left lower lobe. There is a small left pleural effusion. There are additional airspace infiltrates bilaterally with interstitial changes. The cardiac silhouette and mediastinum are stable.     Impression: Impression: Worsening infiltrate within the left lower lobe. Additional infiltrates are seen throughout the lungs bilaterally with scattered interstitial changes. There is also a small left pleural effusion. The findings suggest progressing atypical/viral infection or multifocal pneumonia versus worsening pulmonary edema. Electronically Signed: Andrea Vallejo MD  10/5/2024 11:23 AM EDT  Workstation ID: [id number]         Assessment & Plan   Assessment & Plan       COVID    Community acquired pneumonia of left lower lobe of lung      74 y.o. male With history of CKD, vitamin  D deficiency, A-fib, asthma-COPD overlap syndrome, chronic hypoxemic respiratory failure (on 2 L nasal cannula during exertion and nocturnally), HTN, obesity, NEW on CPAP, HLD, DM2, who presented for evaluation of shortness of breath, cough, congestion, shortness of breath, wheezing that began on 9/29. He was seen in the ER on 10/2 and diagnosed with COVID.  He declined admission at that time.  However, he continued to feel poorly and have hypoxia, so he presented again for further evaluation.    In the ER, patient was afebrile, heart rate 89, blood pressure 130/68, satting 83% on room air.  Placed on 4 L nasal cannula and oxygenation improved to 95%.  Labs notable for BUN 83, creatinine 1.19, proBNP 2/7/2006, high-sensitivity troponin 86, WBC 9, hemoglobin 10.9, lactic 1.1, procalcitonin 0.51, COVID-positive on 10/2.  Chest x-ray with worsening infiltrate in the left lower lobe and bilateral infiltrates as well.  Patient was given ceftriaxone, azithromycin, dexamethasone, albuterol.  He was admitted for further management.    COVID-19 PNA  LLL CAP  Asthma-COPD overlap syndrome  Acute on chronic hypoxemic respiratory failure  Pulmonary edema  -COVID-positive on 10/2  -Chest x-ray with bilateral infiltrates, worse on the left lower lobe, Pro-Dennis positive  -Concern for secondary bacterial pneumonia as well, continue ceftriaxone, azithromycin  -Probiotic  -Remdesivir, Decadron  -Scheduled albuterol  -Continue home inhaler equivalents  -Follow-up urine antigens, sputum culture  -Wean O2 as tolerated  -Airway clearance, flutter valve, incentive spirometry  -Lasix 40 mg IV x 1    Thrush-clotrimazole    DM2-basal insulin, SSI, glucose checks, hypoglycemia protocol    NEW-CPAP nightly  [id number] diltiazem, Xarelto  HTN-continue doxazosin formulary equivalent, spironolactone; holding lisinopril, hydrochlorothiazide, hydralazine, resume as blood pressure allows      DVT prophylaxis: Xarelto    CODE STATUS:  FULL  CODE  Level Of Support Discussed With: Patient  Code Status (Patient has no pulse and is not breathing): CPR (Attempt to Resuscitate)  Medical Interventions (Patient has pulse or is breathing): Full Support      Expected Discharge  Expected Discharge Date: 10/8/2024; Expected Discharge Time:       This note has been completed as part of a split-shared workflow.     Signature: Electronically signed by Nicole Colin MD, 10/05/24, 2:07 PM EDT

## 2024-10-06 LAB
ALBUMIN SERPL-MCNC: 2.9 G/DL (ref 3.5–5.2)
ALBUMIN/GLOB SERPL: 1 G/DL
ALP SERPL-CCNC: 34 U/L (ref 39–117)
ALT SERPL W P-5'-P-CCNC: 26 U/L (ref 1–41)
ANION GAP SERPL CALCULATED.3IONS-SCNC: 3 MMOL/L (ref 5–15)
AST SERPL-CCNC: 22 U/L (ref 1–40)
BASOPHILS # BLD AUTO: 0 10*3/MM3 (ref 0–0.2)
BASOPHILS NFR BLD AUTO: 0 % (ref 0–1.5)
BILIRUB SERPL-MCNC: 0.4 MG/DL (ref 0–1.2)
BUN SERPL-MCNC: 56 MG/DL (ref 8–23)
BUN/CREAT SERPL: 53.8 (ref 7–25)
CALCIUM SPEC-SCNC: 8.5 MG/DL (ref 8.6–10.5)
CHLORIDE SERPL-SCNC: 102 MMOL/L (ref 98–107)
CO2 SERPL-SCNC: 34 MMOL/L (ref 22–29)
CREAT SERPL-MCNC: 1.04 MG/DL (ref 0.76–1.27)
CRP SERPL-MCNC: 4.39 MG/DL (ref 0–0.5)
DEPRECATED RDW RBC AUTO: 55 FL (ref 37–54)
EGFRCR SERPLBLD CKD-EPI 2021: 74.9 ML/MIN/1.73
EOSINOPHIL # BLD AUTO: 0 10*3/MM3 (ref 0–0.4)
EOSINOPHIL NFR BLD AUTO: 0 % (ref 0.3–6.2)
ERYTHROCYTE [DISTWIDTH] IN BLOOD BY AUTOMATED COUNT: 14.3 % (ref 12.3–15.4)
GLOBULIN UR ELPH-MCNC: 2.8 GM/DL
GLUCOSE BLDC GLUCOMTR-MCNC: 122 MG/DL (ref 70–130)
GLUCOSE BLDC GLUCOMTR-MCNC: 167 MG/DL (ref 70–130)
GLUCOSE BLDC GLUCOMTR-MCNC: 282 MG/DL (ref 70–130)
GLUCOSE BLDC GLUCOMTR-MCNC: 67 MG/DL (ref 70–130)
GLUCOSE BLDC GLUCOMTR-MCNC: 93 MG/DL (ref 70–130)
GLUCOSE SERPL-MCNC: 64 MG/DL (ref 65–99)
HCT VFR BLD AUTO: 32.8 % (ref 37.5–51)
HGB BLD-MCNC: 10.5 G/DL (ref 13–17.7)
IMM GRANULOCYTES # BLD AUTO: 0.02 10*3/MM3 (ref 0–0.05)
IMM GRANULOCYTES NFR BLD AUTO: 0.2 % (ref 0–0.5)
LYMPHOCYTES # BLD AUTO: 0.53 10*3/MM3 (ref 0.7–3.1)
LYMPHOCYTES NFR BLD AUTO: 6.4 % (ref 19.6–45.3)
MCH RBC QN AUTO: 33.2 PG (ref 26.6–33)
MCHC RBC AUTO-ENTMCNC: 32 G/DL (ref 31.5–35.7)
MCV RBC AUTO: 103.8 FL (ref 79–97)
MONOCYTES # BLD AUTO: 0.76 10*3/MM3 (ref 0.1–0.9)
MONOCYTES NFR BLD AUTO: 9.2 % (ref 5–12)
NEUTROPHILS NFR BLD AUTO: 6.91 10*3/MM3 (ref 1.7–7)
NEUTROPHILS NFR BLD AUTO: 84.2 % (ref 42.7–76)
NRBC BLD AUTO-RTO: 0 /100 WBC (ref 0–0.2)
PLATELET # BLD AUTO: 150 10*3/MM3 (ref 140–450)
PMV BLD AUTO: 9.3 FL (ref 6–12)
POTASSIUM SERPL-SCNC: 4.8 MMOL/L (ref 3.5–5.2)
PROT SERPL-MCNC: 5.7 G/DL (ref 6–8.5)
QT INTERVAL: 384 MS
QTC INTERVAL: 456 MS
RBC # BLD AUTO: 3.16 10*6/MM3 (ref 4.14–5.8)
SODIUM SERPL-SCNC: 139 MMOL/L (ref 136–145)
WBC NRBC COR # BLD AUTO: 8.22 10*3/MM3 (ref 3.4–10.8)

## 2024-10-06 PROCEDURE — 25810000003 SODIUM CHLORIDE 0.9 % SOLUTION 250 ML FLEX CONT

## 2024-10-06 PROCEDURE — 94799 UNLISTED PULMONARY SVC/PX: CPT

## 2024-10-06 PROCEDURE — 63710000001 DEXAMETHASONE PER 0.25 MG: Performed by: STUDENT IN AN ORGANIZED HEALTH CARE EDUCATION/TRAINING PROGRAM

## 2024-10-06 PROCEDURE — 94664 DEMO&/EVAL PT USE INHALER: CPT

## 2024-10-06 PROCEDURE — 97166 OT EVAL MOD COMPLEX 45 MIN: CPT

## 2024-10-06 PROCEDURE — 99232 SBSQ HOSP IP/OBS MODERATE 35: CPT | Performed by: STUDENT IN AN ORGANIZED HEALTH CARE EDUCATION/TRAINING PROGRAM

## 2024-10-06 PROCEDURE — 85025 COMPLETE CBC W/AUTO DIFF WBC: CPT | Performed by: STUDENT IN AN ORGANIZED HEALTH CARE EDUCATION/TRAINING PROGRAM

## 2024-10-06 PROCEDURE — 25010000002 REMDESIVIR 100 MG/20ML SOLUTION 1 EACH VIAL

## 2024-10-06 PROCEDURE — 87205 SMEAR GRAM STAIN: CPT | Performed by: STUDENT IN AN ORGANIZED HEALTH CARE EDUCATION/TRAINING PROGRAM

## 2024-10-06 PROCEDURE — 86140 C-REACTIVE PROTEIN: CPT | Performed by: STUDENT IN AN ORGANIZED HEALTH CARE EDUCATION/TRAINING PROGRAM

## 2024-10-06 PROCEDURE — 63710000001 INSULIN LISPRO (HUMAN) PER 5 UNITS: Performed by: STUDENT IN AN ORGANIZED HEALTH CARE EDUCATION/TRAINING PROGRAM

## 2024-10-06 PROCEDURE — 87070 CULTURE OTHR SPECIMN AEROBIC: CPT | Performed by: STUDENT IN AN ORGANIZED HEALTH CARE EDUCATION/TRAINING PROGRAM

## 2024-10-06 PROCEDURE — 82948 REAGENT STRIP/BLOOD GLUCOSE: CPT

## 2024-10-06 PROCEDURE — 25010000002 CEFTRIAXONE PER 250 MG: Performed by: STUDENT IN AN ORGANIZED HEALTH CARE EDUCATION/TRAINING PROGRAM

## 2024-10-06 PROCEDURE — 80053 COMPREHEN METABOLIC PANEL: CPT | Performed by: STUDENT IN AN ORGANIZED HEALTH CARE EDUCATION/TRAINING PROGRAM

## 2024-10-06 RX ADMIN — CLOTRIMAZOLE 10 MG: 10 LOZENGE ORAL at 11:24

## 2024-10-06 RX ADMIN — ALBUTEROL SULFATE 2 PUFF: 90 AEROSOL, METERED RESPIRATORY (INHALATION) at 01:24

## 2024-10-06 RX ADMIN — GUAIFENESIN 1200 MG: 600 TABLET, EXTENDED RELEASE ORAL at 08:55

## 2024-10-06 RX ADMIN — AZITHROMYCIN DIHYDRATE 500 MG: 250 TABLET ORAL at 08:55

## 2024-10-06 RX ADMIN — CLOTRIMAZOLE 10 MG: 10 LOZENGE ORAL at 13:38

## 2024-10-06 RX ADMIN — SODIUM CHLORIDE 1000 MG: 900 INJECTION INTRAVENOUS at 11:24

## 2024-10-06 RX ADMIN — BUDESONIDE AND FORMOTEROL FUMARATE DIHYDRATE 2 PUFF: 160; 4.5 AEROSOL RESPIRATORY (INHALATION) at 19:18

## 2024-10-06 RX ADMIN — SENNOSIDES AND DOCUSATE SODIUM 2 TABLET: 50; 8.6 TABLET ORAL at 08:55

## 2024-10-06 RX ADMIN — SPIRONOLACTONE 25 MG: 25 TABLET ORAL at 08:55

## 2024-10-06 RX ADMIN — INSULIN LISPRO 4 UNITS: 100 INJECTION, SOLUTION INTRAVENOUS; SUBCUTANEOUS at 20:27

## 2024-10-06 RX ADMIN — TERAZOSIN HYDROCHLORIDE 10 MG: 5 CAPSULE ORAL at 20:26

## 2024-10-06 RX ADMIN — CLOTRIMAZOLE 10 MG: 10 LOZENGE ORAL at 17:01

## 2024-10-06 RX ADMIN — ALBUTEROL SULFATE 2 PUFF: 90 AEROSOL, METERED RESPIRATORY (INHALATION) at 11:19

## 2024-10-06 RX ADMIN — DILTIAZEM HYDROCHLORIDE 360 MG: 180 CAPSULE, EXTENDED RELEASE ORAL at 08:55

## 2024-10-06 RX ADMIN — SODIUM CHLORIDE 100 MG: 9 INJECTION, SOLUTION INTRAVENOUS at 15:24

## 2024-10-06 RX ADMIN — GUAIFENESIN 1200 MG: 600 TABLET, EXTENDED RELEASE ORAL at 20:26

## 2024-10-06 RX ADMIN — CLOTRIMAZOLE 10 MG: 10 LOZENGE ORAL at 06:23

## 2024-10-06 RX ADMIN — RIVAROXABAN 20 MG: 20 TABLET, FILM COATED ORAL at 17:01

## 2024-10-06 RX ADMIN — MONTELUKAST SODIUM 5 MG: 5 TABLET, CHEWABLE ORAL at 20:26

## 2024-10-06 RX ADMIN — ALBUTEROL SULFATE 2 PUFF: 90 AEROSOL, METERED RESPIRATORY (INHALATION) at 15:32

## 2024-10-06 RX ADMIN — ALBUTEROL SULFATE 2 PUFF: 90 AEROSOL, METERED RESPIRATORY (INHALATION) at 19:17

## 2024-10-06 RX ADMIN — ALBUTEROL SULFATE 2 PUFF: 90 AEROSOL, METERED RESPIRATORY (INHALATION) at 07:13

## 2024-10-06 RX ADMIN — Medication 1 CAPSULE: at 08:55

## 2024-10-06 RX ADMIN — BUDESONIDE AND FORMOTEROL FUMARATE DIHYDRATE 2 PUFF: 160; 4.5 AEROSOL RESPIRATORY (INHALATION) at 07:13

## 2024-10-06 RX ADMIN — SENNOSIDES AND DOCUSATE SODIUM 2 TABLET: 50; 8.6 TABLET ORAL at 20:26

## 2024-10-06 RX ADMIN — INSULIN LISPRO 2 UNITS: 100 INJECTION, SOLUTION INTRAVENOUS; SUBCUTANEOUS at 17:41

## 2024-10-06 RX ADMIN — DEXAMETHASONE 6 MG: 4 TABLET ORAL at 08:55

## 2024-10-06 RX ADMIN — CLOTRIMAZOLE 10 MG: 10 LOZENGE ORAL at 20:26

## 2024-10-06 RX ADMIN — TIOTROPIUM BROMIDE INHALATION SPRAY 2 PUFF: 3.12 SPRAY, METERED RESPIRATORY (INHALATION) at 07:13

## 2024-10-06 NOTE — PLAN OF CARE
Problem: Adult Inpatient Plan of Care  Goal: Plan of Care Review  Outcome: Progressing  Flowsheets (Taken 10/6/2024 0503)  Progress: no change  Plan of Care Reviewed With: patient  Goal: Patient-Specific Goal (Individualized)  Outcome: Progressing  Goal: Absence of Hospital-Acquired Illness or Injury  Outcome: Progressing  Intervention: Identify and Manage Fall Risk  Recent Flowsheet Documentation  Taken 10/6/2024 0400 by Santiago Moreland RN  Safety Promotion/Fall Prevention:   assistive device/personal items within [facility]   fall prevention program maintained  Taken 10/6/2024 0200 by Santiago Moreland RN  Safety Promotion/Fall Prevention:   assistive device/personal items within [facility]   fall prevention program maintained  Taken 10/6/2024 0000 by Santiago Moreland RN  Safety Promotion/Fall Prevention:   assistive device/personal items within [facility]   fall prevention program maintained  Taken 10/5/2024 2200 by Santiago Moreland RN  Safety Promotion/Fall Prevention: safety round/check completed  Taken 10/5/2024 2000 by Santiago Moreland RN  Safety Promotion/Fall Prevention:   assistive device/personal items within [facility]   fall prevention program maintained  Intervention: Prevent Skin Injury  Recent Flowsheet Documentation  Taken 10/5/2024 2200 by Santiago Moreland RN  Body Position: sitting up in bed  Goal: Optimal Comfort and Wellbeing  Outcome: Progressing  Intervention: Provide Person-Centered Care  Recent Flowsheet Documentation  Taken 10/5/2024 2200 by Santiago Moreland RN  Trust Relationship/Rapport:   questions answered   questions encouraged   reassurance provided   thoughts/feelings acknowledged  Goal: Readiness for Transition of Care  Outcome: Progressing     Problem: Fall Injury Risk  Goal: Absence of Fall and Fall-Related Injury  Outcome: Progressing  Intervention: Promote Injury-Free Environment  Recent Flowsheet Documentation  Taken 10/6/2024 0400 by Santiago Moreland RN  Safety Promotion/Fall Prevention:   assistive device/personal items within  [facility]   fall prevention program maintained  Taken 10/6/2024 0200 by Santiago Moreland RN  Safety Promotion/Fall Prevention:   assistive device/personal items within [facility]   fall prevention program maintained  Taken 10/6/2024 0000 by Santiago Moreland RN  Safety Promotion/Fall Prevention:   assistive device/personal items within [facility]   fall prevention program maintained  Taken 10/5/2024 2200 by Santiago Moreland RN  Safety Promotion/Fall Prevention: safety round/check completed  Taken 10/5/2024 2000 by Santiago Moreland RN  Safety Promotion/Fall Prevention:   assistive device/personal items within [facility]   fall prevention program maintained     Problem: Noninvasive Ventilation Acute  Goal: Effective Unassisted Ventilation and Oxygenation  Outcome: Progressing     Problem: COPD (Chronic Obstructive Pulmonary Disease) Comorbidity  Goal: Maintenance of COPD Symptom Control  Outcome: Progressing     Problem: Diabetes Comorbidity  Goal: Blood Glucose Level Within Targeted Range  Outcome: Progressing     Problem: Obstructive Sleep Apnea Risk or Actual Comorbidity Management  Goal: Unobstructed Breathing During Sleep  Outcome: Progressing   Goal Outcome Evaluation:  Plan of Care Reviewed With: patient        Progress: no change

## 2024-10-06 NOTE — PLAN OF CARE
Goal Outcome Evaluation:  Plan of Care Reviewed With: patient           Outcome Evaluation: OT eval completed. Pt presents below baseline for ADL's, limited by significant weakness, SOA, and decreased activity tolerance. Pt Dep for donning socks, Zahra for STS from chair to FWW, ModA for toileting tasks in standing. IP OT services warranted. Recommend home with assist at discharge pending further mobility assessment.      Anticipated Discharge Disposition (OT): home with assist

## 2024-10-06 NOTE — PROGRESS NOTES
[facility] Medicine Services  PROGRESS NOTE    Patient Name: Jonatan Mcguire  : 1950  MRN: [id number]    Date of Admission: 10/5/2024  Primary Care Physician: Provider, No Known    Subjective   Subjective     CC:  Follow-up shortness of breath    HPI:  Still with cough, no chest pain.  Has some shortness of breath.  No nausea or vomiting.  No bowel movement.  On 4 L nasal cannula.      Objective   Objective     Vital Signs:   Temp:  [97.8 °F (36.6 °C)-98.4 °F (36.9 °C)] 98.2 °F (36.8 °C)  Heart Rate:  [] 79  Resp:  [14-22] 19  BP: ()/(59-84) 137/78  Flow (L/min):  [4-5] 4     Physical Exam:  Constitutional: No acute distress, awake, alert  HENT: NCAT, mucous membranes moist, white plaques in mouth  Respiratory: Rales in the right base, scattered expiratory wheeze, improved aeration compared to yesterday, respiratory effort normal   Cardiovascular: IRIR, HR 80s  Gastrointestinal: Positive bowel sounds, soft, nontender, nondistended  Musculoskeletal: bilateral ankle edema  Psychiatric: Appropriate affect, cooperative  Neurologic: Alert, oriented, symmetric facies, symmetric palate rise, tongue midline, moves all extremities, speech clear  Skin: B/I lichenification of lower extremities    Results Reviewed:  LAB RESULTS:      Lab 10/06/24  0644 10/05/24  1343 10/05/24  1129 10/01/24  2201   WBC 8.22  --  9.72 12.46*   HEMOGLOBIN 10.5*  --  10.9* 11.9*   HEMATOCRIT 32.8*  --  34.7* 37.6   PLATELETS 150  --  162 136*   NEUTROS ABS 6.91  --  7.90* 9.56*   IMMATURE GRANS (ABS) 0.02  --  0.06* 0.06*   LYMPHS ABS 0.53*  --  0.78 1.96   MONOS ABS 0.76  --  0.98* 0.86   EOS ABS 0.00  --  0.00 0.00   .8*  --  103.6* 103.3*   SED RATE  --   --  24*  --    CRP 4.39*  --  4.58*  --    PROCALCITONIN  --   --  0.51*  --    LACTATE  --   --  1.1 0.8   LDH  --   --  192  --    HSTROP T  --  82* 86*  --          Lab 10/06/24  0644 10/05/24  1129 10/01/24  2201   SODIUM 139 139 141    POTASSIUM 4.8 4.5 4.4   CHLORIDE 102 102 102   CO2 34.0* 30.0* 30.0*   ANION GAP 3.0* 7.0 9.0   BUN 56* 83* 24*   CREATININE 1.04 1.19 1.04   EGFR 74.9 63.7 74.9   GLUCOSE 64* 110* 88   CALCIUM 8.5* 8.8 8.6   MAGNESIUM  --  2.4  --    TSH  --  1.220  --          Lab 10/06/24  0644 10/05/24  1129 10/01/24  2201   TOTAL PROTEIN 5.7* 6.0 6.4   ALBUMIN 2.9* 3.4* 3.5   GLOBULIN 2.8 2.6 2.9   ALT (SGPT) 26 31 32   AST (SGOT) 22 23 37   BILIRUBIN 0.4 0.6 0.9   ALK PHOS 34* 40 52         Lab 10/05/24  1343 10/05/24  1129   PROBNP  --  2,706.0*   HSTROP T 82* 86*                 Lab 10/01/24  2206   FIO2 28   CARBOXYHEMOGLOBIN (VENOUS) 2.2     Brief Urine Lab Results       None            Microbiology Results Abnormal       Procedure Component Value - Date/Time    Legionella Antigen, Urine - Urine, Urine, Clean Catch [[id number]]  (Normal) Collected: 10/05/24 1604    Lab Status: Final result Specimen: Urine, Clean Catch Updated: 10/05/24 2207     LEGIONELLA ANTIGEN, URINE Negative    S. Pneumo Ag Urine or CSF - Urine, Urine, Clean Catch [[id number]]  (Normal) Collected: 10/05/24 1604    Lab Status: Final result Specimen: Urine, Clean Catch Updated: 10/05/24 2206     Strep Pneumo Ag Negative            XR Chest 1 View    Result Date: 10/5/2024  XR CHEST 1 VW Date of Exam: 10/5/2024 11:16 AM EDT Indication: SOA triage protocol Comparison: 10/1/2024 at 2205 hours Findings: Worsening infiltrate is noted within the left lower lobe. There is a small left pleural effusion. There are additional airspace infiltrates bilaterally with interstitial changes. The cardiac silhouette and mediastinum are stable.     Impression: Impression: Worsening infiltrate within the left lower lobe. Additional infiltrates are seen throughout the lungs bilaterally with scattered interstitial changes. There is also a small left pleural effusion. The findings suggest progressing atypical/viral infection or multifocal pneumonia versus worsening pulmonary  edema. Electronically Signed: Andrea Vallejo MD  10/5/2024 11:23 AM EDT  Workstation ID: [id number]         Current medications:  Scheduled Meds:albuterol sulfate HFA, 2 puff, Inhalation, 4x Daily - RT  azithromycin, 500 mg, Oral, Q24H  budesonide-formoterol, 2 puff, Inhalation, BID - RT  cefTRIAXone, 1,000 mg, Intravenous, Q24H  clotrimazole, 10 mg, Oral, 5x Daily  dexAMETHasone, 6 mg, Oral, Daily With Breakfast  dilTIAZem CD, 360 mg, Oral, Daily  guaiFENesin, 1,200 mg, Oral, Q12H  [Held by provider] hydrALAZINE, 100 mg, Oral, Daily  [Held by provider] lisinopril, 20 mg, Oral, Q24H   And  [Held by provider] hydroCHLOROthiazide, 25 mg, Oral, Q24H  [Held by provider] insulin glargine, 5 Units, Subcutaneous, Nightly  insulin lispro, 2-7 Units, Subcutaneous, 4x Daily AC & at Bedtime  lactobacillus acidophilus, 1 capsule, Oral, Daily  montelukast, 5 mg, Oral, Nightly  [Held by provider] predniSONE, 10 mg, Oral, Daily  remdesivir, 100 mg, Intravenous, Q24H  rivaroxaban, 20 mg, Oral, Daily With Dinner  senna-docusate sodium, 2 tablet, Oral, BID  spironolactone, 25 mg, Oral, Daily  terazosin, 10 mg, Oral, Nightly  tiotropium bromide monohydrate, 2 puff, Inhalation, Daily - RT      Continuous Infusions:Pharmacy Consult - Remdesivir,       PRN Meds:.  albuterol sulfate HFA    benzonatate    senna-docusate sodium **AND** polyethylene glycol **AND** bisacodyl **AND** bisacodyl    Calcium Replacement - Follow Nurse / BPA Driven Protocol    dextrose    dextrose    glucagon (human recombinant)    Magnesium Standard Dose Replacement - Follow Nurse / BPA Driven Protocol    nitroglycerin    Pharmacy Consult - Remdesivir    Phosphorus Replacement - Follow Nurse / BPA Driven Protocol    Potassium Replacement - Follow Nurse / BPA Driven Protocol    sodium chloride    Assessment & Plan   Assessment & Plan     Active Hospital Problems    Diagnosis  POA    **COVID [U07.1]  Yes    Community acquired pneumonia of left lower lobe of lung  [J18.9]  Unknown      Resolved Hospital Problems   No resolved problems to display.        Brief Hospital Course to date:  Jonatan Mcguire is a 74 y.o. male With history of CKD, vitamin D deficiency, A-fib, asthma-COPD overlap syndrome, chronic hypoxemic respiratory failure (on 2 L nasal cannula during exertion and nocturnally), HTN, obesity, NEW on CPAP, HLD, DM2, who presented for evaluation of shortness of breath, cough, congestion, shortness of breath, wheezing that began on 9/29. He was seen in the ER on 10/2 and diagnosed with COVID.  He declined admission at that time.  However, he continued to feel poorly and have hypoxia, so he presented again for further evaluation. Labs notable forb procalcitonin 0.51, COVID-positive on 10/2.  Chest x-ray with worsening infiltrate in the left lower lobe and bilateral infiltrates as well.  Patient was given ceftriaxone, azithromycin, dexamethasone, albuterol.  He was admitted for further management.     COVID-19 PNA  LLL CAP  Asthma-COPD overlap syndrome  Acute on chronic hypoxemic respiratory failure  Pulmonary edema  -COVID-positive on 10/2  -Chest x-ray with bilateral infiltrates, worse on the left lower lobe, Pro-Dennis positive  -Concern for secondary bacterial pneumonia as well, continue ceftriaxone, azithromycin  -Probiotic  -Remdesivir, Decadron  -Scheduled albuterol  -Continue home inhaler equivalents  -Urine antigens negative, sputum culture  -Wean O2 as tolerated  -Airway clearance, flutter valve, incentive spirometry  -S/p Lasix 40 mg IV x 1 on 10/5, resume PO lasix     Thrush-clotrimazole     DM2-basal insulin on hold for hypoglycemia this AM, SSI, glucose checks, hypoglycemia protocol     NEW-CPAP nightly  [id number] diltiazem, Xarelto  HTN-continue doxazosin formulary equivalent, spironolactone, lisinopril, hydrochlorothiazide    Expected Discharge Location and Transportation: pending PT/OT  Expected Discharge   Expected Discharge Date: 10/8/2024; Expected  Discharge Time:      VTE Prophylaxis:  Pharmacologic & mechanical VTE prophylaxis orders are present.         AM-PAC 6 Clicks Score (PT): 18 (10/05/24 1870)    CODE STATUS:   Code Status and Medical Interventions: CPR (Attempt to Resuscitate); Full Support   Ordered at: 10/05/24 5382     Level Of Support Discussed With:    Patient     Code Status (Patient has no pulse and is not breathing):    CPR (Attempt to Resuscitate)     Medical Interventions (Patient has pulse or is breathing):    Full Support       Nicole Colin MD  10/06/24

## 2024-10-06 NOTE — THERAPY EVALUATION
Patient Name: Jonatan Mcguire  : 1950    MRN: [id number]                              Today's Date: 10/6/2024       Admit Date: 10/5/2024    Visit Dx:     ICD-10-CM ICD-9-CM   1. COVID-19  U07.1 079.89   2. Acute respiratory failure, unspecified whether with hypoxia or hypercapnia  J96.00 518.81   3. COPD exacerbation  J44.1 491.21   4. Community acquired pneumonia, unspecified laterality  J18.9 486     Patient Active Problem List   Diagnosis    COVID    Community acquired pneumonia of left lower lobe of lung     Past Medical History:   Diagnosis Date    Afib     COPD (chronic obstructive pulmonary disease)     Diabetes mellitus, type II     Hypertension     Obesity     Sleep apnea     Vitamin D deficiency      Past Surgical History:   Procedure Laterality Date    COLON RESECTION      NASAL POLYP EXCISION        General Information       Row Name 10/06/24 1558          OT Time and Intention    Document Type evaluation  -SWATI     Mode of Treatment occupational therapy  -       Row Name 10/06/24 1558          General Information    Patient Profile Reviewed yes  -SWATI     Prior Level of Function independent:;ADL's;bed mobility;transfer;all household mobility;community mobility;driving;using stairs  Ambulates using quad cane at baseline  -SWATI     Existing Precautions/Restrictions fall;oxygen therapy device and L/min;other (see comments)  Contact and Airborne (COVID); hx COPD; BLE cellulitis  -SWATI     Barriers to Rehab medically complex  -SWATI       Row Name 10/06/24 1550          Occupational Profile    Environmental Supports and Barriers (Occupational Profile) Lives with spouse in single level home; ambulates using quad cane at baseline; has tub shower with shower stool; typically sponge bathes; 2L supplemental O2 with activity and overnight  -SWATI       Row Name 10/06/24 3377          Living Environment    People in Home spouse  -SWATI       Row Name 10/06/24 1558          Home Main Entrance    Number of Stairs, Main Entrance  four  -SWATI     Stair Railings, Main Entrance railings on both sides of stairs  -       Row Name 10/06/24 1558          Stairs Within Home, Primary    Number of Stairs, Within Home, Primary none  -       Row Name 10/06/24 1558          Cognition    Orientation Status (Cognition) oriented x 3  -SWATI       Row Name 10/06/24 1558          Safety Issues, Functional Mobility    Safety Issues Affecting Function (Mobility) insight into deficits/self-awareness;judgment;problem-solving  -     Impairments Affecting Function (Mobility) balance;endurance/activity tolerance;shortness of breath;strength  -               User Key  (r) = Recorded By, (t) = Taken By, (c) = Cosigned By      Initials Name Provider Type    SWATI Liya Hillman OT Occupational Therapist                     Mobility/ADL's       Row Name 10/06/24 1602          Bed Mobility    Comment, (Bed Mobility) Received UI and returned to chair  -       Row Name 10/06/24 1602          Transfers    Transfers sit-stand transfer  -       Row Name 10/06/24 1602          Sit-Stand Transfer    Sit-Stand [place] (Transfers) minimum assist (75% patient effort);verbal cues  -     Assistive Device (Sit-Stand Transfers) walker, front-wheeled  -     Comment, (Sit-Stand Transfer) cues for HP  -       Row Name 10/06/24 1602          Functional Mobility    Functional Mobility- Ind. Level not tested  -       Row Name 10/06/24 1602          Activities of Daily Living    BADL Assessment/Intervention lower body dressing;grooming;toileting  -       Row Name 10/06/24 1602          Lower Body Dressing Assessment/Training    [place] Level (Lower Body Dressing) don;socks;dependent (less than 25% patient effort)  -     Position (Lower Body Dressing) supported sitting  -       Row Name 10/06/24 1602          Grooming Assessment/Training    [place] Level (Grooming) oral care regimen;wash face, hands;set up  -     Oral Care teeth brushed - regular toothbrush   -SWATI     Position (Grooming) supported sitting  -SWATI       Row Name 10/06/24 1602          Toileting Assessment/Training    [place] Level (Toileting) adjust/manage clothing;perform perineal hygiene;moderate assist (50% patient effort)  -SWATI     Assistive Devices (Toileting) urinal  -SWATI     Position (Toileting) unsupported standing  -SWATI     Comment, (Toileting) standing at walker to void; assist for clothing management  -SWATI               User Key  (r) = Recorded By, (t) = Taken By, (c) = Cosigned By      Initials Name Provider Type    Liya Nuñez OT Occupational Therapist                   Obj/Interventions       Row Name 10/06/24 1604          Sensory Assessment (Somatosensory)    Sensory Assessment (Somatosensory) UE sensation intact  -       Row Name 10/06/24 1604          Vision Assessment/Intervention    Visual Impairment/Limitations WFL;corrective lenses full-time  -       Row Name 10/06/24 1604          Range of Motion Comprehensive    General Range of Motion bilateral upper extremity ROM WFL  -       Row Name 10/06/24 1604          Strength Comprehensive (MMT)    Comment, General Manual Muscle Testing (MMT) Assessment BUE's grossly 4/5  -       Row Name 10/06/24 1604          Balance    Balance Assessment sitting static balance;sitting dynamic balance;standing static balance;standing dynamic balance  -SWATI     Static Sitting Balance independent  -SWATI     Dynamic Sitting Balance contact guard  -SWATI     Position, Sitting Balance unsupported;sitting in chair  -SWATI     Static Standing Balance contact guard  -SWATI     Dynamic Standing Balance minimal assist  -SWATI     Position/Device Used, Standing Balance supported;walker, front-wheeled  -SWATI     Balance Interventions standing;occupation based/functional task  -SWATI     Comment, Balance CGA for standing balance while standing to void using urinal  -SWATI               User Key  (r) = Recorded By, (t) = Taken By, (c) = Cosigned By      Initials Name Provider  Type    Liya Nuñez OT Occupational Therapist                   Goals/Plan       Row Name 10/06/24 1610          Transfer Goal 1 (OT)    Activity/Assistive Device (Transfer Goal 1, OT) sit-to-stand/stand-to-sit;bed-to-chair/chair-to-bed;toilet  -SWATI     [place] Level/Cues Needed (Transfer Goal 1, OT) supervision required  -SWATI     Time Frame (Transfer Goal 1, OT) long term goal (LTG);10 days  -SWATI     Progress/Outcome (Transfer Goal 1, OT) new goal  -SWATI       Row Name 10/06/24 1610          Toileting Goal 1 (OT)    Activity/Device (Toileting Goal 1, OT) adjust/manage clothing;perform perineal hygiene;commode;grab bar/safety frame  -SWATI     [place] Level/Cues Needed (Toileting Goal 1, OT) supervision required  -SWATI     Time Frame (Toileting Goal 1, OT) short term goal (STG);1 week  -SWATI     Progress/Outcome (Toileting Goal 1, OT) new goal  -SWATI       Row Name 10/06/24 1610          Therapy Assessment/Plan (OT)    Planned Therapy Interventions (OT) activity tolerance training;BADL retraining;functional balance retraining;occupation/activity based interventions;patient/caregiver education/training;ROM/therapeutic exercise;strengthening exercise;transfer/mobility retraining  -               User Key  (r) = Recorded By, (t) = Taken By, (c) = Cosigned By      Initials Name Provider Type    Liya Nuñez OT Occupational Therapist                   Clinical Impression       Row Name 10/06/24 1605          Pain Assessment    Additional Documentation Pain Scale: FACES Pre/Post-Treatment (Group)  -       Row Name 10/06/24 1605          Pain Scale: FACES Pre/Post-Treatment    Pain: FACES Scale, Pretreatment 0-->no hurt  -SWATI     Posttreatment Pain Rating 0-->no hurt  -       Row Name 10/06/24 1605          Plan of Care Review    Plan of Care Reviewed With patient  -SWATI     Outcome Evaluation OT eval completed. Pt presents below baseline for ADL's, limited by significant weakness, SOA, and decreased activity  tolerance. Pt Dep for donning socks, Zahra for STS from chair to FWW, ModA for toileting tasks in standing. IP OT services warranted. Recommend home with assist at discharge pending further mobility assessment.  -SWATI       Row Name 10/06/24 1605          Therapy Assessment/Plan (OT)    Patient/Family Therapy Goal Statement (OT) To return to PLOF.  -SWATI     Rehab Potential (OT) good, to achieve stated therapy goals  -SWATI     Criteria for Skilled Therapeutic Interventions Met (OT) yes;meets criteria;skilled treatment is necessary  -SWATI     Predicted Duration of Therapy Intervention (OT) 10 days  -SWATI       Row Name 10/06/24 1605          Therapy Plan Review/Discharge Plan (OT)    Anticipated Discharge Disposition (OT) home with assist  -SWATI       Row Name 10/06/24 1605          Vital Signs    Pre Systolic BP Rehab 136  -SWATI     Pre Treatment Diastolic BP 74  -SWATI     Post Systolic BP Rehab 132  -SWATI     Post Treatment Diastolic BP 74  -SWATI     Pretreatment Heart Rate (beats/min) 61  -SWATI     Posttreatment Heart Rate (beats/min) 65  -SWATI     Pre SpO2 (%) 96  -SWATI     O2 Delivery Pre Treatment nasal cannula  -SWATI     O2 Delivery Intra Treatment nasal cannula  -SWATI     Post SpO2 (%) 93  -SWATI     O2 Delivery Post Treatment nasal cannula  -SWATI     Pre Patient Position Sitting  -SWATI     Intra Patient Position Standing  -SWATI     Post Patient Position Sitting  -SWATI       Row Name 10/06/24 1605          Positioning and Restraints    Pre-Treatment Position sitting in chair/recliner  -SWATI     Post Treatment Position chair  -SWATI     In Chair notified nsg;reclined;call light within reach;encouraged to call for assist;exit alarm on;waffle cushion;legs elevated  -SWATI               User Key  (r) = Recorded By, (t) = Taken By, (c) = Cosigned By      Initials Name Provider Type    Liya Nuñez, OT Occupational Therapist                   Outcome Measures       Row Name 10/06/24 1610          How much help from another is currently needed...    Putting on  and taking off regular lower body clothing? 2  -SWATI     Bathing (including washing, rinsing, and drying) 2  -SWATI     Toileting (which includes using toilet bed pan or urinal) 2  -SWATI     Putting on and taking off regular upper body clothing 3  -SWATI     Taking care of personal grooming (such as brushing teeth) 3  -SWATI     Eating meals 4  -SWATI     AM-PAC 6 Clicks Score (OT) 16  -SWATI       Row Name 10/06/24 1610          Functional Assessment    Outcome Measure Options AM-PAC 6 Clicks Daily Activity (OT)  -SWATI               User Key  (r) = Recorded By, (t) = Taken By, (c) = Cosigned By      Initials Name Provider Type    Liya Nuñez OT Occupational Therapist                    Occupational Therapy Education       Title: PT OT SLP Therapies (In Progress)       Topic: Occupational Therapy (In Progress)       Point: ADL training (Done)       Description:   Instruct learner(s) on proper safety adaptation and remediation techniques during self care or transfers.   Instruct in proper use of assistive devices.                  Learning Progress Summary             Patient Acceptance, E, VU by SWATI at 10/6/2024 1611    Comment: OT POC                         Point: Home exercise program (Not Started)       Description:   Instruct learner(s) on appropriate technique for monitoring, assisting and/or progressing therapeutic exercises/activities.                  Learner Progress:  Not documented in this visit.              Point: Precautions (Done)       Description:   Instruct learner(s) on prescribed precautions during self-care and functional transfers.                  Learning Progress Summary             Patient Acceptance, E, VU by SWATI at 10/6/2024 1611    Comment: OT POC                         Point: Body mechanics (Not Started)       Description:   Instruct learner(s) on proper positioning and spine alignment during self-care, functional mobility activities and/or exercises.                  Learner Progress:  Not documented  in this visit.                              User Key       Initials Effective Dates Name Provider Type Discipline     06/16/21 -  Liya Hillman OT Occupational Therapist OT                  OT Recommendation and Plan  Planned Therapy Interventions (OT): activity tolerance training, BADL retraining, functional balance retraining, occupation/activity based interventions, patient/caregiver education/training, ROM/therapeutic exercise, strengthening exercise, transfer/mobility retraining  Plan of Care Review  Plan of Care Reviewed With: patient  Outcome Evaluation: OT eval completed. Pt presents below baseline for ADL's, limited by significant weakness, SOA, and decreased activity tolerance. Pt Dep for donning socks, Zahra for STS from chair to FWW, ModA for toileting tasks in standing. IP OT services warranted. Recommend home with assist at discharge pending further mobility assessment.     Time Calculation:   Evaluation Complexity (OT)  Review Occupational Profile/Medical/Therapy History Complexity: expanded/moderate complexity  Assessment, Occupational Performance/Identification of Deficit Complexity: 3-5 performance deficits  Clinical Decision Making Complexity (OT): detailed assessment/moderate complexity  Overall Complexity of Evaluation (OT): moderate complexity     Time Calculation- OT       Row Name 10/06/24 1527             Time Calculation- OT    OT Start Time 1527  -SWATI      OT Received On 10/06/24  -SWATI      OT Goal Re-Cert Due Date 10/16/24  -SWATI         Untimed Charges    OT Eval/Re-eval Minutes 47  -SWATI         Total Minutes    Untimed Charges Total Minutes 47  -SWATI       Total Minutes 47  -SWATI                User Key  (r) = Recorded By, (t) = Taken By, (c) = Cosigned By      Initials Name Provider Type    SWATI Liya Hillman OT Occupational Therapist                  Therapy Charges for Today       Code Description Service Date Service Provider Modifiers Qty    15338154672 HC OT EVAL MOD COMPLEXITY 4  10/6/2024 Liya Hillman, OT GO 1                 Liya iHllman, OT  10/6/2024

## 2024-10-07 LAB
ALBUMIN SERPL-MCNC: 3 G/DL (ref 3.5–5.2)
ALBUMIN/GLOB SERPL: 1.2 G/DL
ALP SERPL-CCNC: 39 U/L (ref 39–117)
ALT SERPL W P-5'-P-CCNC: 26 U/L (ref 1–41)
ANION GAP SERPL CALCULATED.3IONS-SCNC: 4 MMOL/L (ref 5–15)
AST SERPL-CCNC: 28 U/L (ref 1–40)
BASOPHILS # BLD AUTO: 0.01 10*3/MM3 (ref 0–0.2)
BASOPHILS NFR BLD AUTO: 0.1 % (ref 0–1.5)
BILIRUB SERPL-MCNC: 0.4 MG/DL (ref 0–1.2)
BUN SERPL-MCNC: 38 MG/DL (ref 8–23)
BUN/CREAT SERPL: 50 (ref 7–25)
CALCIUM SPEC-SCNC: 8.5 MG/DL (ref 8.6–10.5)
CHLORIDE SERPL-SCNC: 100 MMOL/L (ref 98–107)
CO2 SERPL-SCNC: 36 MMOL/L (ref 22–29)
CREAT SERPL-MCNC: 0.76 MG/DL (ref 0.76–1.27)
DEPRECATED RDW RBC AUTO: 53.2 FL (ref 37–54)
EGFRCR SERPLBLD CKD-EPI 2021: 93.7 ML/MIN/1.73
EOSINOPHIL # BLD AUTO: 0 10*3/MM3 (ref 0–0.4)
EOSINOPHIL NFR BLD AUTO: 0 % (ref 0.3–6.2)
ERYTHROCYTE [DISTWIDTH] IN BLOOD BY AUTOMATED COUNT: 14.1 % (ref 12.3–15.4)
GLOBULIN UR ELPH-MCNC: 2.6 GM/DL
GLUCOSE BLDC GLUCOMTR-MCNC: 124 MG/DL (ref 70–130)
GLUCOSE BLDC GLUCOMTR-MCNC: 269 MG/DL (ref 70–130)
GLUCOSE BLDC GLUCOMTR-MCNC: 281 MG/DL (ref 70–130)
GLUCOSE BLDC GLUCOMTR-MCNC: 291 MG/DL (ref 70–130)
GLUCOSE SERPL-MCNC: 127 MG/DL (ref 65–99)
HCT VFR BLD AUTO: 32.8 % (ref 37.5–51)
HGB BLD-MCNC: 10.4 G/DL (ref 13–17.7)
IMM GRANULOCYTES # BLD AUTO: 0.05 10*3/MM3 (ref 0–0.05)
IMM GRANULOCYTES NFR BLD AUTO: 0.6 % (ref 0–0.5)
LYMPHOCYTES # BLD AUTO: 0.39 10*3/MM3 (ref 0.7–3.1)
LYMPHOCYTES NFR BLD AUTO: 4.7 % (ref 19.6–45.3)
MCH RBC QN AUTO: 32.6 PG (ref 26.6–33)
MCHC RBC AUTO-ENTMCNC: 31.7 G/DL (ref 31.5–35.7)
MCV RBC AUTO: 102.8 FL (ref 79–97)
MONOCYTES # BLD AUTO: 0.68 10*3/MM3 (ref 0.1–0.9)
MONOCYTES NFR BLD AUTO: 8.2 % (ref 5–12)
NEUTROPHILS NFR BLD AUTO: 7.19 10*3/MM3 (ref 1.7–7)
NEUTROPHILS NFR BLD AUTO: 86.4 % (ref 42.7–76)
NRBC BLD AUTO-RTO: 0 /100 WBC (ref 0–0.2)
PLATELET # BLD AUTO: 155 10*3/MM3 (ref 140–450)
PMV BLD AUTO: 9.6 FL (ref 6–12)
POTASSIUM SERPL-SCNC: 5 MMOL/L (ref 3.5–5.2)
PROT SERPL-MCNC: 5.6 G/DL (ref 6–8.5)
RBC # BLD AUTO: 3.19 10*6/MM3 (ref 4.14–5.8)
SODIUM SERPL-SCNC: 140 MMOL/L (ref 136–145)
WBC NRBC COR # BLD AUTO: 8.32 10*3/MM3 (ref 3.4–10.8)

## 2024-10-07 PROCEDURE — 94799 UNLISTED PULMONARY SVC/PX: CPT

## 2024-10-07 PROCEDURE — 94664 DEMO&/EVAL PT USE INHALER: CPT

## 2024-10-07 PROCEDURE — 63710000001 INSULIN GLARGINE PER 5 UNITS: Performed by: STUDENT IN AN ORGANIZED HEALTH CARE EDUCATION/TRAINING PROGRAM

## 2024-10-07 PROCEDURE — 85025 COMPLETE CBC W/AUTO DIFF WBC: CPT | Performed by: STUDENT IN AN ORGANIZED HEALTH CARE EDUCATION/TRAINING PROGRAM

## 2024-10-07 PROCEDURE — 97162 PT EVAL MOD COMPLEX 30 MIN: CPT

## 2024-10-07 PROCEDURE — 63710000001 DEXAMETHASONE PER 0.25 MG: Performed by: STUDENT IN AN ORGANIZED HEALTH CARE EDUCATION/TRAINING PROGRAM

## 2024-10-07 PROCEDURE — 99232 SBSQ HOSP IP/OBS MODERATE 35: CPT | Performed by: STUDENT IN AN ORGANIZED HEALTH CARE EDUCATION/TRAINING PROGRAM

## 2024-10-07 PROCEDURE — 63710000001 INSULIN LISPRO (HUMAN) PER 5 UNITS: Performed by: STUDENT IN AN ORGANIZED HEALTH CARE EDUCATION/TRAINING PROGRAM

## 2024-10-07 PROCEDURE — 94667 MNPJ CHEST WALL 1ST: CPT

## 2024-10-07 PROCEDURE — 25810000003 SODIUM CHLORIDE 0.9 % SOLUTION 250 ML FLEX CONT

## 2024-10-07 PROCEDURE — 82948 REAGENT STRIP/BLOOD GLUCOSE: CPT

## 2024-10-07 PROCEDURE — 25010000002 CEFTRIAXONE PER 250 MG: Performed by: STUDENT IN AN ORGANIZED HEALTH CARE EDUCATION/TRAINING PROGRAM

## 2024-10-07 PROCEDURE — 80053 COMPREHEN METABOLIC PANEL: CPT | Performed by: STUDENT IN AN ORGANIZED HEALTH CARE EDUCATION/TRAINING PROGRAM

## 2024-10-07 PROCEDURE — 97116 GAIT TRAINING THERAPY: CPT

## 2024-10-07 PROCEDURE — 25010000002 REMDESIVIR 100 MG/20ML SOLUTION 1 EACH VIAL

## 2024-10-07 RX ADMIN — CLOTRIMAZOLE 10 MG: 10 LOZENGE ORAL at 11:19

## 2024-10-07 RX ADMIN — ALBUTEROL SULFATE 2 PUFF: 90 AEROSOL, METERED RESPIRATORY (INHALATION) at 20:01

## 2024-10-07 RX ADMIN — SENNOSIDES AND DOCUSATE SODIUM 2 TABLET: 50; 8.6 TABLET ORAL at 21:21

## 2024-10-07 RX ADMIN — HYDROCHLOROTHIAZIDE 25 MG: 25 TABLET ORAL at 11:20

## 2024-10-07 RX ADMIN — BUDESONIDE AND FORMOTEROL FUMARATE DIHYDRATE 2 PUFF: 160; 4.5 AEROSOL RESPIRATORY (INHALATION) at 06:33

## 2024-10-07 RX ADMIN — BUDESONIDE AND FORMOTEROL FUMARATE DIHYDRATE 2 PUFF: 160; 4.5 AEROSOL RESPIRATORY (INHALATION) at 20:01

## 2024-10-07 RX ADMIN — GUAIFENESIN 1200 MG: 600 TABLET, EXTENDED RELEASE ORAL at 21:21

## 2024-10-07 RX ADMIN — RIVAROXABAN 20 MG: 20 TABLET, FILM COATED ORAL at 17:21

## 2024-10-07 RX ADMIN — GUAIFENESIN 1200 MG: 600 TABLET, EXTENDED RELEASE ORAL at 11:20

## 2024-10-07 RX ADMIN — TERAZOSIN HYDROCHLORIDE 10 MG: 5 CAPSULE ORAL at 21:21

## 2024-10-07 RX ADMIN — DILTIAZEM HYDROCHLORIDE 360 MG: 180 CAPSULE, EXTENDED RELEASE ORAL at 11:19

## 2024-10-07 RX ADMIN — SPIRONOLACTONE 25 MG: 25 TABLET ORAL at 11:20

## 2024-10-07 RX ADMIN — INSULIN LISPRO 4 UNITS: 100 INJECTION, SOLUTION INTRAVENOUS; SUBCUTANEOUS at 11:51

## 2024-10-07 RX ADMIN — INSULIN LISPRO 4 UNITS: 100 INJECTION, SOLUTION INTRAVENOUS; SUBCUTANEOUS at 21:21

## 2024-10-07 RX ADMIN — ALBUTEROL SULFATE 2 PUFF: 90 AEROSOL, METERED RESPIRATORY (INHALATION) at 17:00

## 2024-10-07 RX ADMIN — CLOTRIMAZOLE 10 MG: 10 LOZENGE ORAL at 06:04

## 2024-10-07 RX ADMIN — ALBUTEROL SULFATE 2 PUFF: 90 AEROSOL, METERED RESPIRATORY (INHALATION) at 06:33

## 2024-10-07 RX ADMIN — INSULIN LISPRO 4 UNITS: 100 INJECTION, SOLUTION INTRAVENOUS; SUBCUTANEOUS at 17:20

## 2024-10-07 RX ADMIN — ALBUTEROL SULFATE 2 PUFF: 90 AEROSOL, METERED RESPIRATORY (INHALATION) at 01:41

## 2024-10-07 RX ADMIN — ALBUTEROL SULFATE 2 PUFF: 90 AEROSOL, METERED RESPIRATORY (INHALATION) at 13:16

## 2024-10-07 RX ADMIN — LISINOPRIL 20 MG: 20 TABLET ORAL at 11:20

## 2024-10-07 RX ADMIN — INSULIN GLARGINE 5 UNITS: 100 INJECTION, SOLUTION SUBCUTANEOUS at 21:21

## 2024-10-07 RX ADMIN — CLOTRIMAZOLE 10 MG: 10 LOZENGE ORAL at 14:29

## 2024-10-07 RX ADMIN — DEXAMETHASONE 6 MG: 4 TABLET ORAL at 11:19

## 2024-10-07 RX ADMIN — CLOTRIMAZOLE 10 MG: 10 LOZENGE ORAL at 21:21

## 2024-10-07 RX ADMIN — SENNOSIDES AND DOCUSATE SODIUM 2 TABLET: 50; 8.6 TABLET ORAL at 11:19

## 2024-10-07 RX ADMIN — AZITHROMYCIN DIHYDRATE 500 MG: 250 TABLET ORAL at 11:20

## 2024-10-07 RX ADMIN — MONTELUKAST SODIUM 5 MG: 5 TABLET, CHEWABLE ORAL at 21:21

## 2024-10-07 RX ADMIN — Medication 1 CAPSULE: at 11:19

## 2024-10-07 RX ADMIN — TIOTROPIUM BROMIDE INHALATION SPRAY 2 PUFF: 3.12 SPRAY, METERED RESPIRATORY (INHALATION) at 06:33

## 2024-10-07 RX ADMIN — SODIUM CHLORIDE 100 MG: 9 INJECTION, SOLUTION INTRAVENOUS at 17:21

## 2024-10-07 RX ADMIN — CLOTRIMAZOLE 10 MG: 10 LOZENGE ORAL at 17:21

## 2024-10-07 RX ADMIN — SODIUM CHLORIDE 1000 MG: 900 INJECTION INTRAVENOUS at 11:19

## 2024-10-07 NOTE — PLAN OF CARE
Goal Outcome Evaluation:  Plan of Care Reviewed With: patient        Progress: improving  Outcome Evaluation: PT up to chair today. VSS on 4L NC- did require increase to 5L to keep sats >88 % with activity. + bm, adequate UOP. Sputum sent to lab.

## 2024-10-07 NOTE — CASE MANAGEMENT/SOCIAL WORK
Discharge Planning Assessment  [facility]     Patient Name: Jonatan Mcguire  MRN: [id number]  Today's Date: 10/7/2024    Admit Date: 10/5/2024    Plan: IPR   Discharge Needs Assessment       Row Name 10/07/24 1658       Living Environment    People in Home spouse    Name(s) of People in Home Bhavya Mcguire - spouse    Current Living Arrangements home    Provides Primary Care For no one    Family Caregiver if Needed spouse    Family Caregiver Names Bhavya Mcguire- wife    Quality of Family Relationships helpful;involved;supportive    Able to Return to Prior Arrangements other (see comments)  TBD       Transition Planning    Patient/Family Anticipates Transition to inpatient rehabilitation facility    Patient/Family Anticipated Services at Transition        Discharge Needs Assessment    Readmission Within the Last 30 Days no previous admission in last 30 days    Equipment Currently Used at Home cane, quad;oxygen;cpap    Concerns to be Addressed discharge planning    Outpatient/Agency/Support Group Needs inpatient rehabilitation facility    Discharge Facility/Level of Care Needs rehabilitation facility    Current Discharge Risk chronically ill;physical impairment                   Discharge Plan       Row Name 10/07/24 1700       Plan    Plan IPR    Patient/Family in Agreement with Plan yes    Plan Comments I have spoken to Mr. Mcguire by phone today.  He states that he lives in a home he shares with his wife in [facility].  He reports that he is independent with activities of daily living prior to this hospital admission.  He reports use of a quad cane to assist with mobility.  He also uses home oxygen 2L at  and a CPAP supplied by [employer].  He denies current receipt of home health/OP services.  He confirms that his PCP is Dr Rae who has recently retired.  I have confirmed that his insurance is Medicare and [facility].  I have discussed PT/OT recommendations for inpatient rehab.  He is agreeable and  requests referral to [facility].  CM will cont to follow plan of care, submit referral to [facility] and cont to assist with discharge planning as receommendnations become available.    Final Discharge Disposition Code 62 - inpatient rehab facility                  Continued Care and Services - Admitted Since 10/5/2024       Destination       Service Provider Request Status Selected Services Address Phone Fax Patient Preferred    [facility] Pending - No Request Sent N/A [address] [facility] [id number] [phone] --                  Expected Discharge Date and Time       Expected Discharge Date Expected Discharge Time    Oct 10, 2024            Demographic Summary       Row Name 10/07/24 1657       General Information    Admission Type inpatient    Arrived From home    Referral Source admission list    Reason for Consult discharge planning    Preferred Language English       Contact Information    Permission Granted to Share Info With                    Functional Status       Row Name 10/07/24 1657       Functional Status, IADL    Medications independent    Meal Preparation independent    Housekeeping independent    Laundry independent    Shopping independent       Employment/    Employment Status retired               Janna Brown RN

## 2024-10-07 NOTE — THERAPY EVALUATION
Patient Name: Jonatan Mcguire  : 1950    MRN: [id number]                              Today's Date: 10/7/2024       Admit Date: 10/5/2024    Visit Dx:     ICD-10-CM ICD-9-CM   1. COVID-19  U07.1 079.89   2. Acute respiratory failure, unspecified whether with hypoxia or hypercapnia  J96.00 518.81   3. COPD exacerbation  J44.1 491.21   4. Community acquired pneumonia, unspecified laterality  J18.9 486     Patient Active Problem List   Diagnosis    COVID    Community acquired pneumonia of left lower lobe of lung     Past Medical History:   Diagnosis Date    Afib     COPD (chronic obstructive pulmonary disease)     Diabetes mellitus, type II     Hypertension     Obesity     Sleep apnea     Vitamin D deficiency      Past Surgical History:   Procedure Laterality Date    COLON RESECTION      NASAL POLYP EXCISION        General Information       Row Name 10/07/24 1033          Physical Therapy Time and Intention    Document Type evaluation  -     Mode of Treatment physical therapy  -       Row Name 10/07/24 1033          General Information    Patient Profile Reviewed yes  -     Prior Level of Function independent:;all household mobility;community mobility;gait;transfer;bed mobility;ADL's  amb with cane  -     Existing Precautions/Restrictions fall;oxygen therapy device and L/min;other (see comments)  Contact and Airborne (COVID); hx COPD; BLE cellulitis  -     Barriers to Rehab medically complex  -       Row Name 10/07/24 1033          Living Environment    People in Home spouse;other (see comments)  spouse unable to provide limited assistances as she amb with FWW herself  -       Row Name 10/07/24 1033          Home Main Entrance    Number of Stairs, Main Entrance four  -     Stair Railings, Main Entrance railings on both sides of stairs  -       Row Name 10/07/24 1033          Stairs Within Home, Primary    Number of Stairs, Within Home, Primary none  -       Row Name 10/07/24 1033           Cognition    Orientation Status (Cognition) oriented x 3  -       Row Name 10/07/24 1033          Safety Issues, Functional Mobility    Safety Issues Affecting Function (Mobility) insight into deficits/self-awareness;awareness of need for assistance;positioning of assistive device  -     Impairments Affecting Function (Mobility) balance;endurance/activity tolerance;shortness of breath;strength;sensation/sensory awareness  -               User Key  (r) = Recorded By, (t) = Taken By, (c) = Cosigned By      Initials Name Provider Type     Adrianna Vitale, JOE Physical Therapist                   Mobility       Row Name 10/07/24 1034          Bed Mobility    Comment, (Bed Mobility) pt [facility]  -       Row Name 10/07/24 1034          Transfers    Comment, (Transfers) VC for hand placement. Pt became SOA with sitting at edge of chair without back support. O2 titrated up to 3.5L NC for further mobility due to desat to 89% on 2L NC.  -       Row Name 10/07/24 1034          Sit-Stand Transfer    Sit-Stand [place] (Transfers) minimum assist (75% patient effort);verbal cues  -     Assistive Device (Sit-Stand Transfers) walker, front-wheeled  -       Row Name 10/07/24 1034          Gait/Stairs (Locomotion)    [place] Level (Gait) minimum assist (75% patient effort);verbal cues;nonverbal cues (demo/gesture)  -     Assistive Device (Gait) walker, front-wheeled  -     Distance in Feet (Gait) 30  -     Deviations/Abnormal Patterns (Gait) bilateral deviations;base of support, wide;weight shifting decreased;stride length decreased;gait speed decreased;festinating/shuffling  -     Bilateral Gait Deviations forward flexed posture;heel strike decreased  -     Comment, (Gait/Stairs) Pt amb with step-through gait pattern, shuffling steps, and significant forward flexed posture. Slow gait speed and wide CLEM noted due to decreased steadiness. Assist required for steadiness. Activity limited by significant  fatigue, SOA, and generalized weakness. Pt desat to 84% on 3.5L NC with ambulation. 2 minute seated rest break required to recover to 90% with VC for PLB.  -               User Key  (r) = Recorded By, (t) = Taken By, (c) = Cosigned By      Initials Name Provider Type     Adrianna Vitale PT Physical Therapist                   Obj/Interventions       Row Name 10/07/24 1036          Range of Motion Comprehensive    General Range of Motion bilateral lower extremity ROM WFL  -       Row Name 10/07/24 1036          Strength Comprehensive (MMT)    General Manual Muscle Testing (MMT) Assessment lower extremity strength deficits identified  -     Comment, General Manual Muscle Testing (MMT) Assessment BLE grossly 4-/5  -       Row Name 10/07/24 1036          Balance    Balance Assessment sitting static balance;sitting dynamic balance;sit to stand dynamic balance;standing static balance;standing dynamic balance  -     Static Sitting Balance standby assist  -     Dynamic Sitting Balance contact guard  -     Position, Sitting Balance sitting in chair  -     Static Standing Balance contact guard  -     Dynamic Standing Balance minimal assist  -     Position/Device Used, Standing Balance supported;walker, rolling  -     Balance Interventions sitting;standing;sit to stand;occupation based/functional task  -       Row Name 10/07/24 1036          Sensory Assessment (Somatosensory)    Sensory Assessment (Somatosensory) LE sensation intact  -               User Key  (r) = Recorded By, (t) = Taken By, (c) = Cosigned By      Initials Name Provider Type     Adrianna Vitale PT Physical Therapist                   Goals/Plan       Row Name 10/07/24 1043          Bed Mobility Goal 1 (PT)    Activity/Assistive Device (Bed Mobility Goal 1, PT) sit to supine;supine to sit  -     [place] Level/Cues Needed (Bed Mobility Goal 1, PT) standby assist  -     Time Frame (Bed Mobility Goal 1, PT) short term goal  (STG);5 days  -       Row Name 10/07/24 1043          Transfer Goal 1 (PT)    Activity/Assistive Device (Transfer Goal 1, PT) sit-to-stand/stand-to-sit;bed-to-chair/chair-to-bed  -     [place] Level/Cues Needed (Transfer Goal 1, PT) standby assist  -HW     Time Frame (Transfer Goal 1, PT) long term goal (LTG);10 days  -       Row Name 10/07/24 1043          Gait Training Goal 1 (PT)    Activity/Assistive Device (Gait Training Goal 1, PT) gait (walking locomotion)  -HW     [place] Level (Gait Training Goal 1, PT) contact guard required  -HW     Distance (Gait Training Goal 1, PT) 150  -HW     Time Frame (Gait Training Goal 1, PT) long term goal (LTG);10 days  -       Row Name 10/07/24 1043          Therapy Assessment/Plan (PT)    Planned Therapy Interventions (PT) balance training;bed mobility training;gait training;home exercise program;patient/family education;ROM (range of motion);strengthening;stretching;transfer training  -               User Key  (r) = Recorded By, (t) = Taken By, (c) = Cosigned By      Initials Name Provider Type    HW Adrianna Vitale, PT Physical Therapist                   Clinical Impression       Row Name 10/07/24 1037          Pain    Pretreatment Pain Rating 0/10 - no pain  -     Posttreatment Pain Rating 0/10 - no pain  -       Row Name 10/07/24 1037          Plan of Care Review    Plan of Care Reviewed With patient  -     Progress no change  -     Outcome Evaluation Pt amb 30' with FWW and Min A. Unsteadiness noted. Activity limited by significant fatigue and SOA. O2 desat to 84% oin 3.5L NC. Two minutes of seated rest break required to recover to 90%. Recommend d/c to IRF to promote increased independence with functional mobility and decrease risk for falls prior to returning home with limited support.  -       Row Name 10/07/24 1037          Therapy Assessment/Plan (PT)    Rehab Potential (PT) good, to achieve stated therapy goals  -     Criteria for  Skilled Interventions Met (PT) yes;meets criteria;skilled treatment is necessary  -     Therapy Frequency (PT) daily  -     Predicted Duration of Therapy Intervention (PT) five days  -       Row Name 10/07/24 1037          Vital Signs    Pre SpO2 (%) 90  2L NC  -HW     O2 Delivery Pre Treatment supplemental O2  -HW     Intra SpO2 (%) 84  3.5L NC  -HW     O2 Delivery Intra Treatment supplemental O2  -HW     Post SpO2 (%) 90  3.5 L NC  -HW     O2 Delivery Post Treatment supplemental O2  -HW     Pre Patient Position Sitting  -HW     Intra Patient Position Standing  -HW     Post Patient Position Sitting  -HW       Row Name 10/07/24 1037          Positioning and Restraints    Pre-Treatment Position sitting in chair/recliner  -HW     Post Treatment Position chair  -HW     In Chair notified nsg;sitting;call light within reach;encouraged to call for assist;exit alarm on;with family/caregiver;waffle cushion  -               User Key  (r) = Recorded By, (t) = Taken By, (c) = Cosigned By      Initials Name Provider Type     Adrianna Vitale, JOE Physical Therapist                   Outcome Measures       Row Name 10/07/24 1044          How much help from another person do you currently need...    Turning from your back to your side while in flat bed without using bedrails? 3  -HW     Moving from lying on back to sitting on the side of a flat bed without bedrails? 3  -HW     Moving to and from a bed to a chair (including a wheelchair)? 3  -HW     Standing up from a chair using your arms (e.g., wheelchair, bedside chair)? 3  -HW     Climbing 3-5 steps with a railing? 3  -HW     To walk in hospital room? 3  -HW     AM-PAC 6 Clicks Score (PT) 18  -     Highest Level of Mobility Goal 6 --> Walk 10 steps or more  -       Row Name 10/07/24 1044          Functional Assessment    Outcome Measure Options AM-PAC 6 Clicks Basic Mobility (PT)  -               User Key  (r) = Recorded By, (t) = Taken By, (c) = Cosigned By       Initials Name Provider Type     Adrianna Vitale, JOE Physical Therapist                                 Physical Therapy Education       Title: PT OT SLP Therapies (In Progress)       Topic: Physical Therapy (Done)       Point: Mobility training (Done)       Learning Progress Summary             Patient Acceptance, E,D, VU,DU by  at 10/7/2024 1046                         Point: Home exercise program (Done)       Learning Progress Summary             Patient Acceptance, E,D, VU,DU by  at 10/7/2024 1046                         Point: Body mechanics (Done)       Learning Progress Summary             Patient Acceptance, E,D, VU,DU by  at 10/7/2024 1046                         Point: Precautions (Done)       Learning Progress Summary             Patient Acceptance, E,D, VU,DU by  at 10/7/2024 1046                                         User Key       Initials Effective Dates Name Provider Type Discipline     12/15/23 -  Adrianna Vitale PT Physical Therapist PT                  PT Recommendation and Plan  Planned Therapy Interventions (PT): balance training, bed mobility training, gait training, home exercise program, patient/family education, ROM (range of motion), strengthening, stretching, transfer training  Plan of Care Reviewed With: patient  Progress: no change  Outcome Evaluation: Pt amb 30' with FWW and Min A. Unsteadiness noted. Activity limited by significant fatigue and SOA. O2 desat to 84% oin 3.5L NC. Two minutes of seated rest break required to recover to 90%. Recommend d/c to IRF to promote increased independence with functional mobility and decrease risk for falls prior to returning home with limited support.     Time Calculation:   PT Evaluation Complexity  History, PT Evaluation Complexity: 1-2 personal factors and/or comorbidities  Examination of Body Systems (PT Eval Complexity): total of 3 or more elements  Clinical Presentation (PT Evaluation Complexity): evolving  Clinical Decision Making (PT  Evaluation Complexity): moderate complexity  Overall Complexity (PT Evaluation Complexity): moderate complexity     PT Charges       Row Name 10/07/24 1047             Time Calculation    Start Time 0930  -HW      PT Received On 10/07/24  -      PT Goal Re-Cert Due Date 10/17/24  -         Timed Charges    28456 - Gait Training Minutes  4  -HW      94915 - PT Therapeutic Activity Minutes 4  -HW         Untimed Charges    PT Eval/Re-eval Minutes 48  -HW         Total Minutes    Timed Charges Total Minutes 8  -HW      Untimed Charges Total Minutes 48  -HW       Total Minutes 56  -HW                User Key  (r) = Recorded By, (t) = Taken By, (c) = Cosigned By      Initials Name Provider Type     Adrianna Vitale, PT Physical Therapist                  Therapy Charges for Today       Code Description Service Date Service Provider Modifiers Qty    89089409919 HC GAIT TRAINING EA 15 MIN 10/7/2024 Adrianna Vitale, PT GP 1    69063920251 HC PT EVAL MOD COMPLEXITY 4 10/7/2024 Adrianna Vitale, PT GP 1            PT G-Codes  Outcome Measure Options: AM-PAC 6 Clicks Basic Mobility (PT)  AM-PAC 6 Clicks Score (PT): 18  AM-PAC 6 Clicks Score (OT): 16  PT Discharge Summary  Anticipated Discharge Disposition (PT): inpatient rehabilitation facility    Adrianna Vitale PT  10/7/2024

## 2024-10-07 NOTE — PLAN OF CARE
Problem: Adult Inpatient Plan of Care  Goal: Plan of Care Review  Outcome: Progressing  Flowsheets (Taken 10/7/2024 1944)  Progress: no change  Plan of Care Reviewed With: patient  Outcome Evaluation: Patient on 2.5L NC, still c/o SOA with rest/exertion. Up w/ 1 assist, up in chair most of the day. No c/o pain. Voiding well, small BM today. Other VSS. A fib on tele.  Goal: Patient-Specific Goal (Individualized)  Outcome: Progressing  Goal: Absence of Hospital-Acquired Illness or Injury  Outcome: Progressing  Goal: Optimal Comfort and Wellbeing  Outcome: Progressing  Goal: Readiness for Transition of Care  Outcome: Progressing     Problem: Fall Injury Risk  Goal: Absence of Fall and Fall-Related Injury  Outcome: Progressing     Problem: Noninvasive Ventilation Acute  Goal: Effective Unassisted Ventilation and Oxygenation  Outcome: Progressing     Problem: COPD (Chronic Obstructive Pulmonary Disease) Comorbidity  Goal: Maintenance of COPD Symptom Control  Outcome: Progressing     Problem: Diabetes Comorbidity  Goal: Blood Glucose Level Within Targeted Range  Outcome: Progressing     Problem: Obstructive Sleep Apnea Risk or Actual Comorbidity Management  Goal: Unobstructed Breathing During Sleep  Outcome: Progressing   Goal Outcome Evaluation:  Plan of Care Reviewed With: patient        Progress: no change  Outcome Evaluation: Patient on 2.5L NC, still c/o SOA with rest/exertion. Up w/ 1 assist, up in chair most of the day. No c/o pain. Voiding well, small BM today. Other VSS. A fib on tele.

## 2024-10-07 NOTE — PLAN OF CARE
Problem: Adult Inpatient Plan of Care  Goal: Plan of Care Review  Outcome: Not Progressing  Flowsheets (Taken 10/6/2024 2030 by Yessenia Alonso RN)  Progress: improving  Plan of Care Reviewed With: patient  Goal: Patient-Specific Goal (Individualized)  Outcome: Not Progressing  Goal: Absence of Hospital-Acquired Illness or Injury  Outcome: Not Progressing  Intervention: Identify and Manage Fall Risk  Recent Flowsheet Documentation  Taken 10/7/2024 0200 by Santiago Moreland RN  Safety Promotion/Fall Prevention:   assistive device/personal items within [facility]   fall prevention program maintained  Taken 10/7/2024 0005 by Santiago Moreland RN  Safety Promotion/Fall Prevention:   assistive device/personal items within reach   fall prevention program maintained  Taken 10/6/2024 2200 by Santiago Moreland RN  Safety Promotion/Fall Prevention:   assistive device/personal items within [facility]   fall prevention program maintained  Intervention: Prevent Infection  Recent Flowsheet Documentation  Taken 10/6/2024 2000 by Santiago Moreland RN  Infection Prevention: visitors restricted/screened  Goal: Optimal Comfort and Wellbeing  Outcome: Not Progressing  Goal: Readiness for Transition of Care  Outcome: Not Progressing     Problem: Fall Injury Risk  Goal: Absence of Fall and Fall-Related Injury  Outcome: Not Progressing  Intervention: Promote Injury-Free Environment  Recent Flowsheet Documentation  Taken 10/7/2024 0200 by Santiago Moreland RN  Safety Promotion/Fall Prevention:   assistive device/personal items within [facility]   fall prevention program maintained  Taken 10/7/2024 0005 by Santiago Moreland RN  Safety Promotion/Fall Prevention:   assistive device/personal items within reach   fall prevention program maintained  Taken 10/6/2024 2200 by Santiago Moreland RN  Safety Promotion/Fall Prevention:   assistive device/personal items within [facility]   fall prevention program maintained     Problem: Noninvasive Ventilation Acute  Goal: Effective Unassisted Ventilation and  Oxygenation  Outcome: Not Progressing     Problem: COPD (Chronic Obstructive Pulmonary Disease) Comorbidity  Goal: Maintenance of COPD Symptom Control  Outcome: Not Progressing     Problem: Diabetes Comorbidity  Goal: Blood Glucose Level Within Targeted Range  Outcome: Not Progressing     Problem: Obstructive Sleep Apnea Risk or Actual Comorbidity Management  Goal: Unobstructed Breathing During Sleep  Outcome: Not Progressing   Goal Outcome Evaluation:  Plan of Care Reviewed With: patient        Progress: no change

## 2024-10-07 NOTE — PROGRESS NOTES
[facility] Medicine Services  PROGRESS NOTE    Patient Name: Jonatan Mcguire  : 1950  MRN: [id number]    Date of Admission: 10/5/2024  Primary Care Physician: Provider, No Known    Subjective   Subjective     CC:  Shortness of breath    HPI:  Patient continues to endorse shortness of breath as well as thick sputum that is difficult to expectorate, white in color.  He denies fevers, chills, sweats.  Admits to profound generalized fatigue.  States he does not feel comfortable going home.      Objective   Objective     Vital Signs:   Temp:  [97.6 °F (36.4 °C)-98.4 °F (36.9 °C)] 97.6 °F (36.4 °C)  Heart Rate:  [63-79] 74  Resp:  [16-22] 18  BP: (118-133)/(61-74) 129/74  Flow (L/min):  [2-4] 2     Physical Exam:  General appearance: alert, awake, oriented, no acute distress, frail, chronically ill-appearing  Cardiovascular: RRR, no murmurs or rubs, radial pulse full 2/4 BL   Respiratory: Minimal rhonchi bibasilar regions, no crackles, no wheezing, oxygenating well on 2 L nasal cannula  Abdomen: soft, non-tender, no organomegaly, bowel sounds normoactive    Neuro/CNS: alert and oriented x3, normal speech    Results Reviewed:  LAB RESULTS:      Lab 10/07/24  0711 10/06/24  0644 10/05/24  1343 10/05/24  1129 10/01/24  2201   WBC 8.32 8.22  --  9.72 12.46*   HEMOGLOBIN 10.4* 10.5*  --  10.9* 11.9*   HEMATOCRIT 32.8* 32.8*  --  34.7* 37.6   PLATELETS 155 150  --  162 136*   NEUTROS ABS 7.19* 6.91  --  7.90* 9.56*   IMMATURE GRANS (ABS) 0.05 0.02  --  0.06* 0.06*   LYMPHS ABS 0.39* 0.53*  --  0.78 1.96   MONOS ABS 0.68 0.76  --  0.98* 0.86   EOS ABS 0.00 0.00  --  0.00 0.00   .8* 103.8*  --  103.6* 103.3*   SED RATE  --   --   --  24*  --    CRP  --  4.39*  --  4.58*  --    PROCALCITONIN  --   --   --  0.51*  --    LACTATE  --   --   --  1.1 0.8   LDH  --   --   --  192  --    HSTROP T  --   --  82* 86*  --          Lab 10/07/24  0711 10/06/24  0644 10/05/24  1129 10/01/24  2201   SODIUM  140 139 139 141   POTASSIUM 5.0 4.8 4.5 4.4   CHLORIDE 100 102 102 102   CO2 36.0* 34.0* 30.0* 30.0*   ANION GAP 4.0* 3.0* 7.0 9.0   BUN 38* 56* 83* 24*   CREATININE 0.76 1.04 1.19 1.04   EGFR 93.7 74.9 63.7 74.9   GLUCOSE 127* 64* 110* 88   CALCIUM 8.5* 8.5* 8.8 8.6   MAGNESIUM  --   --  2.4  --    TSH  --   --  1.220  --          Lab 10/07/24  0711 10/06/24  0644 10/05/24  1129 10/01/24  2201   TOTAL PROTEIN 5.6* 5.7* 6.0 6.4   ALBUMIN 3.0* 2.9* 3.4* 3.5   GLOBULIN 2.6 2.8 2.6 2.9   ALT (SGPT) 26 26 31 32   AST (SGOT) 28 22 23 37   BILIRUBIN 0.4 0.4 0.6 0.9   ALK PHOS 39 34* 40 52         Lab 10/05/24  1343 10/05/24  1129   PROBNP  --  2,706.0*   HSTROP T 82* 86*                 Lab 10/01/24  2206   FIO2 28   CARBOXYHEMOGLOBIN (VENOUS) 2.2     Brief Urine Lab Results       None            Microbiology Results Abnormal       Procedure Component Value - Date/Time    Blood Culture - Blood, Arm, Left [[id number]]  (Normal) Collected: 10/05/24 1155    Lab Status: Preliminary result Specimen: Blood from Arm, Left Updated: 10/07/24 1215     Blood Culture No growth at 2 days    Blood Culture - Blood, Arm, Right [[id number]]  (Normal) Collected: 10/05/24 1155    Lab Status: Preliminary result Specimen: Blood from Arm, Right Updated: 10/07/24 1215     Blood Culture No growth at 2 days    Respiratory Culture - Sputum, Cough [[id number]] Collected: 10/06/24 1724    Lab Status: Preliminary result Specimen: Sputum from Cough Updated: 10/06/24 9973     Gram Stain Moderate (3+) WBCs per low power field      Rare (1+) Epithelial cells per low power field      Few (2+) Gram negative bacilli      Few (2+) Gram positive cocci    Legionella Antigen, Urine - Urine, Urine, Clean Catch [[id number]]  (Normal) Collected: 10/05/24 1604    Lab Status: Final result Specimen: Urine, Clean Catch Updated: 10/05/24 2207     LEGIONELLA ANTIGEN, URINE Negative    S. Pneumo Ag Urine or CSF - Urine, Urine, Clean Catch [[id number]]  (Normal) Collected:  10/05/24 1604    Lab Status: Final result Specimen: Urine, Clean Catch Updated: 10/05/24 2206     Strep Pneumo Ag Negative            No radiology results from the last 24 hrs        Current medications:  Scheduled Meds:albuterol sulfate HFA, 2 puff, Inhalation, 4x Daily - RT  budesonide-formoterol, 2 puff, Inhalation, BID - RT  cefTRIAXone, 1,000 mg, Intravenous, Q24H  clotrimazole, 10 mg, Oral, 5x Daily  dexAMETHasone, 6 mg, Oral, Daily With Breakfast  dilTIAZem CD, 360 mg, Oral, Daily  guaiFENesin, 1,200 mg, Oral, Q12H  [Held by provider] hydrALAZINE, 100 mg, Oral, Daily  lisinopril, 20 mg, Oral, Q24H   And  hydroCHLOROthiazide, 25 mg, Oral, Q24H  insulin glargine, 5 Units, Subcutaneous, Nightly  insulin lispro, 2-7 Units, Subcutaneous, 4x Daily AC & at Bedtime  lactobacillus acidophilus, 1 capsule, Oral, Daily  montelukast, 5 mg, Oral, Nightly  [Held by provider] predniSONE, 10 mg, Oral, Daily  remdesivir, 100 mg, Intravenous, Q24H  rivaroxaban, 20 mg, Oral, Daily With Dinner  senna-docusate sodium, 2 tablet, Oral, BID  spironolactone, 25 mg, Oral, Daily  terazosin, 10 mg, Oral, Nightly  tiotropium bromide monohydrate, 2 puff, Inhalation, Daily - RT      Continuous Infusions:Pharmacy Consult - Remdesivir,       PRN Meds:.  albuterol sulfate HFA    benzonatate    senna-docusate sodium **AND** polyethylene glycol **AND** bisacodyl **AND** bisacodyl    Calcium Replacement - Follow Nurse / BPA Driven Protocol    dextrose    dextrose    glucagon (human recombinant)    Magnesium Standard Dose Replacement - Follow Nurse / BPA Driven Protocol    nitroglycerin    Pharmacy Consult - Remdesivir    Phosphorus Replacement - Follow Nurse / BPA Driven Protocol    Potassium Replacement - Follow Nurse / BPA Driven Protocol    sodium chloride    Assessment & Plan   Assessment & Plan     Active Hospital Problems    Diagnosis  POA    **COVID [U07.1]  Yes    Community acquired pneumonia of left lower lobe of lung [J18.9]  Unknown       Resolved Hospital Problems   No resolved problems to display.     This patient's assessments and plans were partially entered by my partner and updated as appropriate by me on 10/7/2024    Brief Hospital Course to date:  Jonatan Mcguire is a 74 y.o. male With history of CKD, vitamin D deficiency, A-fib, asthma-COPD overlap syndrome, chronic hypoxemic respiratory failure (on 2 L nasal cannula during exertion and nocturnally), HTN, obesity, NEW on CPAP, HLD, DM2, who presented for evaluation of shortness of breath, cough, congestion, shortness of breath, wheezing that began on 9/29. He was seen in the ER on 10/2 and diagnosed with COVID.  He declined admission at that time.  However, he continued to feel poorly and have hypoxia, so he presented again for further evaluation. Labs notable forb procalcitonin 0.51, COVID-positive on 10/2.  Chest x-ray with worsening infiltrate in the left lower lobe and bilateral infiltrates as well.  Patient was given ceftriaxone, azithromycin, dexamethasone, albuterol.  He was admitted for further management.     COVID-19 PNA  LLL CAP  Asthma-COPD overlap syndrome  Acute on chronic hypoxemic respiratory failure  Pulmonary edema  Generalized fatigue/deconditioning   -COVID-positive on 10/2  -Chest x-ray with bilateral infiltrates, worse on the left lower lobe, Pro-Dennis positive  -Concern for secondary bacterial pneumonia as well, continue ceftriaxone, azithromycin  -Probiotic  -Remdesivir, Decadron  -Scheduled albuterol  -Continue home inhaler equivalents  -Wean O2 as tolerated  -Airway clearance, flutter valve, incentive spirometry  -Add chest physiotherapy  -PT/OT recommends rehab      Oral Thrush-clotrimazole     DM2  -Restart Lantus 5 units tonight due to hyperglycemia     NEW-CPAP nightly  [id number] diltiazem, Xarelto  HTN-continue doxazosin formulary equivalent, spironolactone, lisinopril, hydrochlorothiazide    Expected Discharge Location and Transportation: Rehab  Expected  Discharge   Expected Discharge Date: 10/10/2024; Expected Discharge Time:      VTE Prophylaxis:  Pharmacologic & mechanical VTE prophylaxis orders are present.         AM-PAC 6 Clicks Score (PT): 18 (10/07/24 1044)    CODE STATUS:   Code Status and Medical Interventions: CPR (Attempt to Resuscitate); Full Support   Ordered at: 10/05/24 1336     Level Of Support Discussed With:    Patient     Code Status (Patient has no pulse and is not breathing):    CPR (Attempt to Resuscitate)     Medical Interventions (Patient has pulse or is breathing):    Full Support       Shelton Phoenix, DO  10/07/24

## 2024-10-07 NOTE — PLAN OF CARE
Goal Outcome Evaluation:  Plan of Care Reviewed With: patient        Progress: no change  Outcome Evaluation: Pt amb 30' with FWW and Min A. Unsteadiness noted. Activity limited by significant fatigue and SOA. O2 desat to 84% oin 3.5L NC. Two minutes of seated rest break required to recover to 90%. Recommend d/c to IRF to promote increased independence with functional mobility and decrease risk for falls prior to returning home with limited support.      Anticipated Discharge Disposition (PT): inpatient rehabilitation facility

## 2024-10-08 LAB
ALBUMIN SERPL-MCNC: 2.9 G/DL (ref 3.5–5.2)
ALBUMIN/GLOB SERPL: 1.4 G/DL
ALP SERPL-CCNC: 37 U/L (ref 39–117)
ALT SERPL W P-5'-P-CCNC: 25 U/L (ref 1–41)
ANION GAP SERPL CALCULATED.3IONS-SCNC: 3 MMOL/L (ref 5–15)
AST SERPL-CCNC: 13 U/L (ref 1–40)
BILIRUB SERPL-MCNC: 0.4 MG/DL (ref 0–1.2)
BUN SERPL-MCNC: 35 MG/DL (ref 8–23)
BUN/CREAT SERPL: 44.3 (ref 7–25)
CALCIUM SPEC-SCNC: 8.1 MG/DL (ref 8.6–10.5)
CHLORIDE SERPL-SCNC: 97 MMOL/L (ref 98–107)
CO2 SERPL-SCNC: 36 MMOL/L (ref 22–29)
CREAT SERPL-MCNC: 0.79 MG/DL (ref 0.76–1.27)
DEPRECATED RDW RBC AUTO: 51.8 FL (ref 37–54)
EGFRCR SERPLBLD CKD-EPI 2021: 92.6 ML/MIN/1.73
ERYTHROCYTE [DISTWIDTH] IN BLOOD BY AUTOMATED COUNT: 14 % (ref 12.3–15.4)
GLOBULIN UR ELPH-MCNC: 2.1 GM/DL
GLUCOSE BLDC GLUCOMTR-MCNC: 134 MG/DL (ref 70–130)
GLUCOSE BLDC GLUCOMTR-MCNC: 207 MG/DL (ref 70–130)
GLUCOSE BLDC GLUCOMTR-MCNC: 336 MG/DL (ref 70–130)
GLUCOSE BLDC GLUCOMTR-MCNC: 338 MG/DL (ref 70–130)
GLUCOSE BLDC GLUCOMTR-MCNC: 420 MG/DL (ref 70–130)
GLUCOSE BLDC GLUCOMTR-MCNC: 438 MG/DL (ref 70–130)
GLUCOSE SERPL-MCNC: 154 MG/DL (ref 65–99)
HCT VFR BLD AUTO: 32.2 % (ref 37.5–51)
HGB BLD-MCNC: 10.5 G/DL (ref 13–17.7)
MCH RBC QN AUTO: 33 PG (ref 26.6–33)
MCHC RBC AUTO-ENTMCNC: 32.6 G/DL (ref 31.5–35.7)
MCV RBC AUTO: 101.3 FL (ref 79–97)
PLATELET # BLD AUTO: 183 10*3/MM3 (ref 140–450)
PMV BLD AUTO: 9.4 FL (ref 6–12)
POTASSIUM SERPL-SCNC: 4.9 MMOL/L (ref 3.5–5.2)
PROT SERPL-MCNC: 5 G/DL (ref 6–8.5)
RBC # BLD AUTO: 3.18 10*6/MM3 (ref 4.14–5.8)
SODIUM SERPL-SCNC: 136 MMOL/L (ref 136–145)
WBC NRBC COR # BLD AUTO: 9.52 10*3/MM3 (ref 3.4–10.8)

## 2024-10-08 PROCEDURE — 85027 COMPLETE CBC AUTOMATED: CPT | Performed by: STUDENT IN AN ORGANIZED HEALTH CARE EDUCATION/TRAINING PROGRAM

## 2024-10-08 PROCEDURE — 99232 SBSQ HOSP IP/OBS MODERATE 35: CPT | Performed by: STUDENT IN AN ORGANIZED HEALTH CARE EDUCATION/TRAINING PROGRAM

## 2024-10-08 PROCEDURE — 82948 REAGENT STRIP/BLOOD GLUCOSE: CPT

## 2024-10-08 PROCEDURE — 25810000003 SODIUM CHLORIDE 0.9 % SOLUTION 250 ML FLEX CONT

## 2024-10-08 PROCEDURE — 80053 COMPREHEN METABOLIC PANEL: CPT | Performed by: STUDENT IN AN ORGANIZED HEALTH CARE EDUCATION/TRAINING PROGRAM

## 2024-10-08 PROCEDURE — 94799 UNLISTED PULMONARY SVC/PX: CPT

## 2024-10-08 PROCEDURE — 94664 DEMO&/EVAL PT USE INHALER: CPT

## 2024-10-08 PROCEDURE — 63710000001 INSULIN LISPRO (HUMAN) PER 5 UNITS: Performed by: STUDENT IN AN ORGANIZED HEALTH CARE EDUCATION/TRAINING PROGRAM

## 2024-10-08 PROCEDURE — 25010000002 CEFTRIAXONE PER 250 MG: Performed by: STUDENT IN AN ORGANIZED HEALTH CARE EDUCATION/TRAINING PROGRAM

## 2024-10-08 PROCEDURE — 94668 MNPJ CHEST WALL SBSQ: CPT

## 2024-10-08 PROCEDURE — 63710000001 DEXAMETHASONE PER 0.25 MG: Performed by: STUDENT IN AN ORGANIZED HEALTH CARE EDUCATION/TRAINING PROGRAM

## 2024-10-08 PROCEDURE — 63710000001 INSULIN GLARGINE PER 5 UNITS: Performed by: STUDENT IN AN ORGANIZED HEALTH CARE EDUCATION/TRAINING PROGRAM

## 2024-10-08 PROCEDURE — 25010000002 REMDESIVIR 100 MG/20ML SOLUTION 1 EACH VIAL

## 2024-10-08 RX ORDER — INSULIN GLARGINE 100 [IU]/ML
10 INJECTION, SOLUTION SUBCUTANEOUS NIGHTLY
Status: DISCONTINUED | OUTPATIENT
Start: 2024-10-08 | End: 2024-10-09

## 2024-10-08 RX ADMIN — LISINOPRIL 20 MG: 20 TABLET ORAL at 10:19

## 2024-10-08 RX ADMIN — Medication 1 CAPSULE: at 10:19

## 2024-10-08 RX ADMIN — ALBUTEROL SULFATE 2 PUFF: 90 AEROSOL, METERED RESPIRATORY (INHALATION) at 19:58

## 2024-10-08 RX ADMIN — INSULIN LISPRO 5 UNITS: 100 INJECTION, SOLUTION INTRAVENOUS; SUBCUTANEOUS at 18:17

## 2024-10-08 RX ADMIN — TERAZOSIN HYDROCHLORIDE 10 MG: 5 CAPSULE ORAL at 22:27

## 2024-10-08 RX ADMIN — CLOTRIMAZOLE 10 MG: 10 LOZENGE ORAL at 18:17

## 2024-10-08 RX ADMIN — MONTELUKAST SODIUM 5 MG: 5 TABLET, CHEWABLE ORAL at 22:27

## 2024-10-08 RX ADMIN — DEXAMETHASONE 6 MG: 4 TABLET ORAL at 10:20

## 2024-10-08 RX ADMIN — CLOTRIMAZOLE 10 MG: 10 LOZENGE ORAL at 06:11

## 2024-10-08 RX ADMIN — SENNOSIDES AND DOCUSATE SODIUM 2 TABLET: 50; 8.6 TABLET ORAL at 10:19

## 2024-10-08 RX ADMIN — HYDROCHLOROTHIAZIDE 25 MG: 25 TABLET ORAL at 10:19

## 2024-10-08 RX ADMIN — SENNOSIDES AND DOCUSATE SODIUM 2 TABLET: 50; 8.6 TABLET ORAL at 22:27

## 2024-10-08 RX ADMIN — CLOTRIMAZOLE 10 MG: 10 LOZENGE ORAL at 10:19

## 2024-10-08 RX ADMIN — SODIUM CHLORIDE 1000 MG: 900 INJECTION INTRAVENOUS at 10:16

## 2024-10-08 RX ADMIN — ALBUTEROL SULFATE 2 PUFF: 90 AEROSOL, METERED RESPIRATORY (INHALATION) at 02:16

## 2024-10-08 RX ADMIN — INSULIN LISPRO 3 UNITS: 100 INJECTION, SOLUTION INTRAVENOUS; SUBCUTANEOUS at 13:05

## 2024-10-08 RX ADMIN — INSULIN GLARGINE 10 UNITS: 100 INJECTION, SOLUTION SUBCUTANEOUS at 22:28

## 2024-10-08 RX ADMIN — ALBUTEROL SULFATE 2 PUFF: 90 AEROSOL, METERED RESPIRATORY (INHALATION) at 06:26

## 2024-10-08 RX ADMIN — RIVAROXABAN 20 MG: 20 TABLET, FILM COATED ORAL at 18:16

## 2024-10-08 RX ADMIN — TIOTROPIUM BROMIDE INHALATION SPRAY 2 PUFF: 3.12 SPRAY, METERED RESPIRATORY (INHALATION) at 06:25

## 2024-10-08 RX ADMIN — BUDESONIDE AND FORMOTEROL FUMARATE DIHYDRATE 2 PUFF: 160; 4.5 AEROSOL RESPIRATORY (INHALATION) at 19:58

## 2024-10-08 RX ADMIN — GUAIFENESIN 1200 MG: 600 TABLET, EXTENDED RELEASE ORAL at 10:19

## 2024-10-08 RX ADMIN — INSULIN LISPRO 5 UNITS: 100 INJECTION, SOLUTION INTRAVENOUS; SUBCUTANEOUS at 22:28

## 2024-10-08 RX ADMIN — GUAIFENESIN 1200 MG: 600 TABLET, EXTENDED RELEASE ORAL at 22:28

## 2024-10-08 RX ADMIN — ALBUTEROL SULFATE 2 PUFF: 90 AEROSOL, METERED RESPIRATORY (INHALATION) at 13:05

## 2024-10-08 RX ADMIN — ALBUTEROL SULFATE 2 PUFF: 90 AEROSOL, METERED RESPIRATORY (INHALATION) at 15:29

## 2024-10-08 RX ADMIN — SPIRONOLACTONE 25 MG: 25 TABLET ORAL at 10:19

## 2024-10-08 RX ADMIN — SODIUM CHLORIDE 100 MG: 9 INJECTION, SOLUTION INTRAVENOUS at 15:30

## 2024-10-08 RX ADMIN — DILTIAZEM HYDROCHLORIDE 360 MG: 180 CAPSULE, EXTENDED RELEASE ORAL at 10:20

## 2024-10-08 RX ADMIN — CLOTRIMAZOLE 10 MG: 10 LOZENGE ORAL at 13:05

## 2024-10-08 RX ADMIN — BUDESONIDE AND FORMOTEROL FUMARATE DIHYDRATE 2 PUFF: 160; 4.5 AEROSOL RESPIRATORY (INHALATION) at 06:26

## 2024-10-08 RX ADMIN — CLOTRIMAZOLE 10 MG: 10 LOZENGE ORAL at 22:28

## 2024-10-08 NOTE — PROGRESS NOTES
[facility] Medicine Services  PROGRESS NOTE    Patient Name: Jonatan Mcguire  : 1950  MRN: [id number]    Date of Admission: 10/5/2024  Primary Care Physician: Provider, No Known    Subjective   Subjective     CC:  Shortness of breath      HPI:  Patient endorses slight improvement in shortness of breath, and improvement in ability to expectorate after chest physiotherapy, but still with significant weakness. Agreeable to rehab facility.       Objective   Objective     Vital Signs:   Temp:  [97.7 °F (36.5 °C)-98.2 °F (36.8 °C)] 97.9 °F (36.6 °C)  Heart Rate:  [53-77] 68  Resp:  [16-18] 18  BP: (118-133)/(59-75) 133/75  Flow (L/min):  [2-3] 2     Physical Exam:  General appearance: alert, awake, oriented, no acute distress, frail, chronically ill-appearing  Cardiovascular: RRR, no murmurs or rubs, radial pulse full 2/4 BL   Respiratory: Rhonchi bibasilar regions L>R, no crackles, no wheezing, oxygenating well on 2 L nasal cannula  Abdomen: soft, non-tender, no organomegaly, bowel sounds normoactive    Neuro/CNS: alert and oriented x3, normal speech       Results Reviewed:  LAB RESULTS:      Lab 10/08/24  0706 10/07/24  0711 10/06/24  0644 10/05/24  1343 10/05/24  1129 10/01/24  2201   WBC 9.52 8.32 8.22  --  9.72 12.46*   HEMOGLOBIN 10.5* 10.4* 10.5*  --  10.9* 11.9*   HEMATOCRIT 32.2* 32.8* 32.8*  --  34.7* 37.6   PLATELETS 183 155 150  --  162 136*   NEUTROS ABS  --  7.19* 6.91  --  7.90* 9.56*   IMMATURE GRANS (ABS)  --  0.05 0.02  --  0.06* 0.06*   LYMPHS ABS  --  0.39* 0.53*  --  0.78 1.96   MONOS ABS  --  0.68 0.76  --  0.98* 0.86   EOS ABS  --  0.00 0.00  --  0.00 0.00   .3* 102.8* 103.8*  --  103.6* 103.3*   SED RATE  --   --   --   --  24*  --    CRP  --   --  4.39*  --  4.58*  --    PROCALCITONIN  --   --   --   --  0.51*  --    LACTATE  --   --   --   --  1.1 0.8   LDH  --   --   --   --  192  --    HSTROP T  --   --   --  82* 86*  --          Lab 10/08/24  0706  10/07/24  0711 10/06/24  0644 10/05/24  1129 10/01/24  2201   SODIUM 136 140 139 139 141   POTASSIUM 4.9 5.0 4.8 4.5 4.4   CHLORIDE 97* 100 102 102 102   CO2 36.0* 36.0* 34.0* 30.0* 30.0*   ANION GAP 3.0* 4.0* 3.0* 7.0 9.0   BUN 35* 38* 56* 83* 24*   CREATININE 0.79 0.76 1.04 1.19 1.04   EGFR 92.6 93.7 74.9 63.7 74.9   GLUCOSE 154* 127* 64* 110* 88   CALCIUM 8.1* 8.5* 8.5* 8.8 8.6   MAGNESIUM  --   --   --  2.4  --    TSH  --   --   --  1.220  --          Lab 10/08/24  0706 10/07/24  0711 10/06/24  0644 10/05/24  1129 10/01/24  2201   TOTAL PROTEIN 5.0* 5.6* 5.7* 6.0 6.4   ALBUMIN 2.9* 3.0* 2.9* 3.4* 3.5   GLOBULIN 2.1 2.6 2.8 2.6 2.9   ALT (SGPT) 25 26 26 31 32   AST (SGOT) 13 28 22 23 37   BILIRUBIN 0.4 0.4 0.4 0.6 0.9   ALK PHOS 37* 39 34* 40 52         Lab 10/05/24  1343 10/05/24  1129   PROBNP  --  2,706.0*   HSTROP T 82* 86*                 Lab 10/01/24  2206   FIO2 28   CARBOXYHEMOGLOBIN (VENOUS) 2.2     Brief Urine Lab Results       None            Microbiology Results Abnormal       Procedure Component Value - Date/Time    Respiratory Culture - Sputum, Cough [[id number]] Collected: 10/06/24 1724    Lab Status: Preliminary result Specimen: Sputum from Cough Updated: 10/08/24 0974     Respiratory Culture Light growth (2+) The culture consists of normal respiratory gurvinder. This is a preliminary report; final report to follow.     Gram Stain Moderate (3+) WBCs per low power field      Rare (1+) Epithelial cells per low power field      Few (2+) Gram negative bacilli      Few (2+) Gram positive cocci    Blood Culture - Blood, Arm, Left [[id number]]  (Normal) Collected: 10/05/24 1155    Lab Status: Preliminary result Specimen: Blood from Arm, Left Updated: 10/07/24 1215     Blood Culture No growth at 2 days    Blood Culture - Blood, Arm, Right [[id number]]  (Normal) Collected: 10/05/24 1155    Lab Status: Preliminary result Specimen: Blood from Arm, Right Updated: 10/07/24 1215     Blood Culture No growth at 2 days     Legionella Antigen, Urine - Urine, Urine, Clean Catch [[id number]]  (Normal) Collected: 10/05/24 1604    Lab Status: Final result Specimen: Urine, Clean Catch Updated: 10/05/24 2207     LEGIONELLA ANTIGEN, URINE Negative    S. Pneumo Ag Urine or CSF - Urine, Urine, Clean Catch [[id number]]  (Normal) Collected: 10/05/24 1604    Lab Status: Final result Specimen: Urine, Clean Catch Updated: 10/05/24 2206     Strep Pneumo Ag Negative            No radiology results from the last 24 hrs        Current medications:  Scheduled Meds:albuterol sulfate HFA, 2 puff, Inhalation, 4x Daily - RT  budesonide-formoterol, 2 puff, Inhalation, BID - RT  cefTRIAXone, 1,000 mg, Intravenous, Q24H  clotrimazole, 10 mg, Oral, 5x Daily  dexAMETHasone, 6 mg, Oral, Daily With Breakfast  dilTIAZem CD, 360 mg, Oral, Daily  guaiFENesin, 1,200 mg, Oral, Q12H  [Held by provider] hydrALAZINE, 100 mg, Oral, Daily  lisinopril, 20 mg, Oral, Q24H   And  hydroCHLOROthiazide, 25 mg, Oral, Q24H  insulin glargine, 10 Units, Subcutaneous, Nightly  insulin lispro, 2-7 Units, Subcutaneous, 4x Daily AC & at Bedtime  lactobacillus acidophilus, 1 capsule, Oral, Daily  montelukast, 5 mg, Oral, Nightly  [Held by provider] predniSONE, 10 mg, Oral, Daily  remdesivir, 100 mg, Intravenous, Q24H  rivaroxaban, 20 mg, Oral, Daily With Dinner  senna-docusate sodium, 2 tablet, Oral, BID  spironolactone, 25 mg, Oral, Daily  terazosin, 10 mg, Oral, Nightly  tiotropium bromide monohydrate, 2 puff, Inhalation, Daily - RT      Continuous Infusions:Pharmacy Consult - Remdesivir,       PRN Meds:.  albuterol sulfate HFA    benzonatate    senna-docusate sodium **AND** polyethylene glycol **AND** bisacodyl **AND** bisacodyl    Calcium Replacement - Follow Nurse / BPA Driven Protocol    dextrose    dextrose    glucagon (human recombinant)    Magnesium Standard Dose Replacement - Follow Nurse / BPA Driven Protocol    nitroglycerin    Pharmacy Consult - Remdesivir    Phosphorus  Replacement - Follow Nurse / BPA Driven Protocol    Potassium Replacement - Follow Nurse / BPA Driven Protocol    sodium chloride    Assessment & Plan   Assessment & Plan     Active Hospital Problems    Diagnosis  POA    **COVID [U07.1]  Yes    Community acquired pneumonia of left lower lobe of lung [J18.9]  Unknown      Resolved Hospital Problems   No resolved problems to display.        Brief Hospital Course to date:  Jonatan Mcguire is a 74 y.o. male with history of CKD, vitamin D deficiency, A-fib, asthma-COPD overlap syndrome, chronic hypoxemic respiratory failure (on 2 L nasal cannula during exertion and nocturnally), HTN, obesity, NEW on CPAP, HLD, DM2, who presented for evaluation of shortness of breath, cough, congestion, shortness of breath, wheezing that began on 9/29. He was seen in the ER on 10/2 and diagnosed with COVID.  He declined admission at that time.  However, he continued to feel poorly and have hypoxia, so he presented again for further evaluation. Labs notable forb procalcitonin 0.51, COVID-positive on 10/2.  Chest x-ray with worsening infiltrate in the left lower lobe and bilateral infiltrates as well.  Patient was given ceftriaxone, azithromycin, dexamethasone, albuterol.  He was admitted for further management.     COVID-19 PNA  LLL CAP  Asthma-COPD overlap syndrome  Acute on chronic hypoxemic respiratory failure  Pulmonary edema  Generalized fatigue/deconditioning   -COVID-positive on 10/2  -Chest x-ray with bilateral infiltrates, worse on the left lower lobe, Pro-Dennis positive  -Concern for secondary bacterial pneumonia as well, continue ceftriaxone, azithromycin (day 4/5)   -Probiotic  -Remdesivir, Decadron (day 4/5)   -Scheduled albuterol  -Continue home inhaler equivalents  -Wean O2 as tolerated  -Airway clearance, flutter valve, incentive spirometry  -Add chest physiotherapy  -PT/OT recommends rehab      Oral Thrush-clotrimazole     DM2  -Continue Lantus 5 units nightly w SSI      NEW-CPAP  nightly  [id number] diltiazem, Xarelto  HTN-continue doxazosin formulary equivalent, spironolactone, lisinopril, hydrochlorothiazide    Expected Discharge Location and Transportation: [facility]  Expected Discharge   Expected Discharge Date: 10/10/2024; Expected Discharge Time:      VTE Prophylaxis:  Pharmacologic & mechanical VTE prophylaxis orders are present.         AM-PAC 6 Clicks Score (PT): 18 (10/07/24 1044)    CODE STATUS:   Code Status and Medical Interventions: CPR (Attempt to Resuscitate); Full Support   Ordered at: 10/05/24 1339     Level Of Support Discussed With:    Patient     Code Status (Patient has no pulse and is not breathing):    CPR (Attempt to Resuscitate)     Medical Interventions (Patient has pulse or is breathing):    Full Support       Shelton Phoenix,   10/08/24

## 2024-10-08 NOTE — PLAN OF CARE
Problem: Adult Inpatient Plan of Care  Goal: Plan of Care Review  Outcome: Progressing  Flowsheets  Taken 10/8/2024 0330 by Santiago Moreland RN  Progress: improving  Taken 10/7/2024 1944 by Yashira Key RN  Plan of Care Reviewed With: patient  Goal: Patient-Specific Goal (Individualized)  Outcome: Progressing  Goal: Absence of Hospital-Acquired Illness or Injury  Outcome: Progressing  Intervention: Identify and Manage Fall Risk  Recent Flowsheet Documentation  Taken 10/8/2024 0200 by Santiago Moreland RN  Safety Promotion/Fall Prevention:   assistive device/personal items within reach   fall prevention program maintained  Taken 10/8/2024 0001 by Santiago Moreland RN  Safety Promotion/Fall Prevention:   assistive device/personal items within reach   fall prevention program maintained  Taken 10/7/2024 2200 by Santiago Moreland RN  Safety Promotion/Fall Prevention:   assistive device/personal items within reach   fall prevention program maintained  Taken 10/7/2024 2000 by Santiago Moreland RN  Safety Promotion/Fall Prevention: safety round/check completed  Intervention: Prevent Skin Injury  Recent Flowsheet Documentation  Taken 10/7/2024 2200 by Santiago Moreland RN  Body Position: position changed independently  Taken 10/7/2024 2000 by Santiago Moreland RN  Body Position: position changed independently  Intervention: Prevent and Manage VTE (Venous Thromboembolism) Risk  Recent Flowsheet Documentation  Taken 10/7/2024 2200 by Santiago Moreland RN  Activity Management: up in chair  Taken 10/7/2024 2000 by Santiago Moreland RN  Activity Management: up in chair  Goal: Optimal Comfort and Wellbeing  Outcome: Progressing  Goal: Readiness for Transition of Care  Outcome: Progressing     Problem: Fall Injury Risk  Goal: Absence of Fall and Fall-Related Injury  Outcome: Progressing  Intervention: Promote Injury-Free Environment  Recent Flowsheet Documentation  Taken 10/8/2024 0200 by Santiago Moreland RN  Safety Promotion/Fall Prevention:   assistive device/personal items  "within reach   fall prevention program maintained  Taken 10/8/2024 0001 by Santiago Moreland RN  Safety Promotion/Fall Prevention:   assistive device/personal items within reach   fall prevention program maintained  Taken 10/7/2024 2200 by Santiago Moreland RN  Safety Promotion/Fall Prevention:   assistive device/personal items within reach   fall prevention program maintained  Taken 10/7/2024 2000 by Santiago oMreland RN  Safety Promotion/Fall Prevention: safety round/check completed     Problem: Noninvasive Ventilation Acute  Goal: Effective Unassisted Ventilation and Oxygenation  Outcome: Progressing     Problem: COPD (Chronic Obstructive Pulmonary Disease) Comorbidity  Goal: Maintenance of COPD Symptom Control  Outcome: Progressing     Problem: Diabetes Comorbidity  Goal: Blood Glucose Level Within Targeted Range  Outcome: Progressing     Problem: Obstructive Sleep Apnea Risk or Actual Comorbidity Management  Goal: Unobstructed Breathing During Sleep  Outcome: Progressing   Goal Outcome Evaluation:  Plan of Care Reviewed With: patient        Progress: improving    PEDRO PABLO overnight, attempted to use BiPAP and could not tolerate, states \" I can't stand that.\" Requested inhalers overnight, O2 at 3L NC Sat 94%.Plan of care ongoing.                                   "

## 2024-10-09 LAB
ALBUMIN SERPL-MCNC: 2.9 G/DL (ref 3.5–5.2)
ALBUMIN/GLOB SERPL: 1.4 G/DL
ALP SERPL-CCNC: 39 U/L (ref 39–117)
ALT SERPL W P-5'-P-CCNC: 26 U/L (ref 1–41)
ANION GAP SERPL CALCULATED.3IONS-SCNC: 3 MMOL/L (ref 5–15)
AST SERPL-CCNC: 13 U/L (ref 1–40)
BACTERIA SPEC RESP CULT: NORMAL
BILIRUB SERPL-MCNC: 0.5 MG/DL (ref 0–1.2)
BUN SERPL-MCNC: 30 MG/DL (ref 8–23)
BUN/CREAT SERPL: 40 (ref 7–25)
CALCIUM SPEC-SCNC: 8.2 MG/DL (ref 8.6–10.5)
CHLORIDE SERPL-SCNC: 96 MMOL/L (ref 98–107)
CO2 SERPL-SCNC: 35 MMOL/L (ref 22–29)
CREAT SERPL-MCNC: 0.75 MG/DL (ref 0.76–1.27)
DEPRECATED RDW RBC AUTO: 51.5 FL (ref 37–54)
EGFRCR SERPLBLD CKD-EPI 2021: 94.1 ML/MIN/1.73
ERYTHROCYTE [DISTWIDTH] IN BLOOD BY AUTOMATED COUNT: 13.8 % (ref 12.3–15.4)
GLOBULIN UR ELPH-MCNC: 2.1 GM/DL
GLUCOSE BLDC GLUCOMTR-MCNC: 139 MG/DL (ref 70–130)
GLUCOSE BLDC GLUCOMTR-MCNC: 234 MG/DL (ref 70–130)
GLUCOSE BLDC GLUCOMTR-MCNC: 293 MG/DL (ref 70–130)
GLUCOSE BLDC GLUCOMTR-MCNC: 299 MG/DL (ref 70–130)
GLUCOSE SERPL-MCNC: 139 MG/DL (ref 65–99)
GRAM STN SPEC: NORMAL
HCT VFR BLD AUTO: 34.3 % (ref 37.5–51)
HGB BLD-MCNC: 11 G/DL (ref 13–17.7)
MCH RBC QN AUTO: 32.5 PG (ref 26.6–33)
MCHC RBC AUTO-ENTMCNC: 32.1 G/DL (ref 31.5–35.7)
MCV RBC AUTO: 101.5 FL (ref 79–97)
PLATELET # BLD AUTO: 210 10*3/MM3 (ref 140–450)
PMV BLD AUTO: 9.5 FL (ref 6–12)
POTASSIUM SERPL-SCNC: 5.2 MMOL/L (ref 3.5–5.2)
PROT SERPL-MCNC: 5 G/DL (ref 6–8.5)
RBC # BLD AUTO: 3.38 10*6/MM3 (ref 4.14–5.8)
SODIUM SERPL-SCNC: 134 MMOL/L (ref 136–145)
WBC NRBC COR # BLD AUTO: 10.56 10*3/MM3 (ref 3.4–10.8)

## 2024-10-09 PROCEDURE — 63710000001 INSULIN GLARGINE PER 5 UNITS: Performed by: STUDENT IN AN ORGANIZED HEALTH CARE EDUCATION/TRAINING PROGRAM

## 2024-10-09 PROCEDURE — 80053 COMPREHEN METABOLIC PANEL: CPT | Performed by: STUDENT IN AN ORGANIZED HEALTH CARE EDUCATION/TRAINING PROGRAM

## 2024-10-09 PROCEDURE — 82948 REAGENT STRIP/BLOOD GLUCOSE: CPT

## 2024-10-09 PROCEDURE — 63710000001 DEXAMETHASONE PER 0.25 MG: Performed by: STUDENT IN AN ORGANIZED HEALTH CARE EDUCATION/TRAINING PROGRAM

## 2024-10-09 PROCEDURE — 25010000002 CEFTRIAXONE PER 250 MG: Performed by: STUDENT IN AN ORGANIZED HEALTH CARE EDUCATION/TRAINING PROGRAM

## 2024-10-09 PROCEDURE — 99232 SBSQ HOSP IP/OBS MODERATE 35: CPT | Performed by: STUDENT IN AN ORGANIZED HEALTH CARE EDUCATION/TRAINING PROGRAM

## 2024-10-09 PROCEDURE — 94799 UNLISTED PULMONARY SVC/PX: CPT

## 2024-10-09 PROCEDURE — 85027 COMPLETE CBC AUTOMATED: CPT | Performed by: STUDENT IN AN ORGANIZED HEALTH CARE EDUCATION/TRAINING PROGRAM

## 2024-10-09 PROCEDURE — 25810000003 SODIUM CHLORIDE 0.9 % SOLUTION 250 ML FLEX CONT

## 2024-10-09 PROCEDURE — 94664 DEMO&/EVAL PT USE INHALER: CPT

## 2024-10-09 PROCEDURE — 97530 THERAPEUTIC ACTIVITIES: CPT

## 2024-10-09 PROCEDURE — 25010000002 REMDESIVIR 100 MG/20ML SOLUTION 1 EACH VIAL

## 2024-10-09 PROCEDURE — 63710000001 INSULIN LISPRO (HUMAN) PER 5 UNITS: Performed by: STUDENT IN AN ORGANIZED HEALTH CARE EDUCATION/TRAINING PROGRAM

## 2024-10-09 PROCEDURE — 97116 GAIT TRAINING THERAPY: CPT

## 2024-10-09 RX ORDER — INSULIN LISPRO 100 [IU]/ML
2-9 INJECTION, SOLUTION INTRAVENOUS; SUBCUTANEOUS
Status: DISCONTINUED | OUTPATIENT
Start: 2024-10-09 | End: 2024-10-10 | Stop reason: HOSPADM

## 2024-10-09 RX ORDER — INSULIN GLARGINE 100 [IU]/ML
15 INJECTION, SOLUTION SUBCUTANEOUS NIGHTLY
Status: DISCONTINUED | OUTPATIENT
Start: 2024-10-09 | End: 2024-10-10 | Stop reason: HOSPADM

## 2024-10-09 RX ADMIN — RIVAROXABAN 20 MG: 20 TABLET, FILM COATED ORAL at 17:50

## 2024-10-09 RX ADMIN — MONTELUKAST SODIUM 5 MG: 5 TABLET, CHEWABLE ORAL at 20:28

## 2024-10-09 RX ADMIN — SODIUM CHLORIDE 100 MG: 9 INJECTION, SOLUTION INTRAVENOUS at 16:01

## 2024-10-09 RX ADMIN — INSULIN GLARGINE 15 UNITS: 100 INJECTION, SOLUTION SUBCUTANEOUS at 20:28

## 2024-10-09 RX ADMIN — INSULIN LISPRO 4 UNITS: 100 INJECTION, SOLUTION INTRAVENOUS; SUBCUTANEOUS at 12:56

## 2024-10-09 RX ADMIN — DEXAMETHASONE 6 MG: 4 TABLET ORAL at 08:40

## 2024-10-09 RX ADMIN — DILTIAZEM HYDROCHLORIDE 360 MG: 180 CAPSULE, EXTENDED RELEASE ORAL at 08:40

## 2024-10-09 RX ADMIN — SPIRONOLACTONE 25 MG: 25 TABLET ORAL at 08:40

## 2024-10-09 RX ADMIN — HYDROCHLOROTHIAZIDE 25 MG: 25 TABLET ORAL at 08:40

## 2024-10-09 RX ADMIN — GUAIFENESIN 1200 MG: 600 TABLET, EXTENDED RELEASE ORAL at 20:28

## 2024-10-09 RX ADMIN — Medication 1 CAPSULE: at 08:40

## 2024-10-09 RX ADMIN — GUAIFENESIN 1200 MG: 600 TABLET, EXTENDED RELEASE ORAL at 08:40

## 2024-10-09 RX ADMIN — LISINOPRIL 20 MG: 20 TABLET ORAL at 08:40

## 2024-10-09 RX ADMIN — INSULIN LISPRO 6 UNITS: 100 INJECTION, SOLUTION INTRAVENOUS; SUBCUTANEOUS at 20:28

## 2024-10-09 RX ADMIN — SENNOSIDES AND DOCUSATE SODIUM 2 TABLET: 50; 8.6 TABLET ORAL at 08:40

## 2024-10-09 RX ADMIN — SODIUM CHLORIDE 1000 MG: 900 INJECTION INTRAVENOUS at 12:56

## 2024-10-09 RX ADMIN — ALBUTEROL SULFATE 2 PUFF: 90 AEROSOL, METERED RESPIRATORY (INHALATION) at 16:02

## 2024-10-09 RX ADMIN — INSULIN LISPRO 6 UNITS: 100 INJECTION, SOLUTION INTRAVENOUS; SUBCUTANEOUS at 17:43

## 2024-10-09 RX ADMIN — TERAZOSIN HYDROCHLORIDE 10 MG: 5 CAPSULE ORAL at 20:28

## 2024-10-09 RX ADMIN — CLOTRIMAZOLE 10 MG: 10 LOZENGE ORAL at 06:06

## 2024-10-09 RX ADMIN — SENNOSIDES AND DOCUSATE SODIUM 2 TABLET: 50; 8.6 TABLET ORAL at 20:28

## 2024-10-09 RX ADMIN — ALBUTEROL SULFATE 2 PUFF: 90 AEROSOL, METERED RESPIRATORY (INHALATION) at 18:58

## 2024-10-09 RX ADMIN — BUDESONIDE AND FORMOTEROL FUMARATE DIHYDRATE 2 PUFF: 160; 4.5 AEROSOL RESPIRATORY (INHALATION) at 08:11

## 2024-10-09 RX ADMIN — ALBUTEROL SULFATE 2 PUFF: 90 AEROSOL, METERED RESPIRATORY (INHALATION) at 08:10

## 2024-10-09 RX ADMIN — CLOTRIMAZOLE 10 MG: 10 LOZENGE ORAL at 20:30

## 2024-10-09 RX ADMIN — ALBUTEROL SULFATE 2 PUFF: 90 AEROSOL, METERED RESPIRATORY (INHALATION) at 02:06

## 2024-10-09 RX ADMIN — TIOTROPIUM BROMIDE INHALATION SPRAY 2 PUFF: 3.12 SPRAY, METERED RESPIRATORY (INHALATION) at 08:11

## 2024-10-09 RX ADMIN — CLOTRIMAZOLE 10 MG: 10 LOZENGE ORAL at 16:01

## 2024-10-09 RX ADMIN — BUDESONIDE AND FORMOTEROL FUMARATE DIHYDRATE 2 PUFF: 160; 4.5 AEROSOL RESPIRATORY (INHALATION) at 18:58

## 2024-10-09 RX ADMIN — ALBUTEROL SULFATE 2 PUFF: 90 AEROSOL, METERED RESPIRATORY (INHALATION) at 11:42

## 2024-10-09 RX ADMIN — CLOTRIMAZOLE 10 MG: 10 LOZENGE ORAL at 12:56

## 2024-10-09 NOTE — PLAN OF CARE
Goal Outcome Evaluation:  Plan of Care Reviewed With: patient        Progress: no change  Outcome Evaluation: Patient remains limited by deficits in strength, endurance, and balance with mobility primarily limited by SOA with SpO2 desaturation to 86% on 3L O2. IPPT remains indicated to address current deficits. Continue to recommend D/C to IPR.      Anticipated Discharge Disposition (PT): inpatient rehabilitation facility

## 2024-10-09 NOTE — PLAN OF CARE
Problem: Adult Inpatient Plan of Care  Goal: Plan of Care Review  Outcome: Progressing  Flowsheets (Taken 10/8/2024 0330)  Progress: improving  Goal: Patient-Specific Goal (Individualized)  Outcome: Progressing  Goal: Absence of Hospital-Acquired Illness or Injury  Outcome: Progressing  Intervention: Identify and Manage Fall Risk  Recent Flowsheet Documentation  Taken 10/9/2024 0400 by Santiago Moreland RN  Safety Promotion/Fall Prevention:   assistive device/personal items within [facility]   fall prevention program maintained  Taken 10/9/2024 0200 by Santiago Moreland RN  Safety Promotion/Fall Prevention:   assistive device/personal items within reach   fall prevention program maintained  Taken 10/8/2024 2000 by Santiago Moreland RN  Safety Promotion/Fall Prevention: safety round/check completed  Intervention: Prevent Skin Injury  Recent Flowsheet Documentation  Taken 10/8/2024 2000 by Santiago Moreland RN  Body Position: position changed independently  Goal: Optimal Comfort and Wellbeing  Outcome: Progressing  Goal: Readiness for Transition of Care  Outcome: Progressing     Problem: Fall Injury Risk  Goal: Absence of Fall and Fall-Related Injury  Outcome: Progressing  Intervention: Promote Injury-Free Environment  Recent Flowsheet Documentation  Taken 10/9/2024 0400 by Santiago Moreland RN  Safety Promotion/Fall Prevention:   assistive device/personal items within [facility]   fall prevention program maintained  Taken 10/9/2024 0200 by Santiago Moreland RN  Safety Promotion/Fall Prevention:   assistive device/personal items within [facility]   fall prevention program maintained  Taken 10/8/2024 2000 by Santiago Moreland RN  Safety Promotion/Fall Prevention: safety round/check completed     Problem: Noninvasive Ventilation Acute  Goal: Effective Unassisted Ventilation and Oxygenation  Outcome: Progressing     Problem: COPD (Chronic Obstructive Pulmonary Disease) Comorbidity  Goal: Maintenance of COPD Symptom Control  Outcome: Progressing     Problem: Diabetes  Comorbidity  Goal: Blood Glucose Level Within Targeted Range  Outcome: Progressing     Problem: Obstructive Sleep Apnea Risk or Actual Comorbidity Management  Goal: Unobstructed Breathing During Sleep  Outcome: Progressing   Goal Outcome Evaluation:  Plan of Care Reviewed With: patient        Progress: improving

## 2024-10-09 NOTE — CASE MANAGEMENT/SOCIAL WORK
Continued Stay Note  [facility]     Patient Name: Jonatan Mcguire  MRN: [id number]  Today's Date: 10/9/2024    Admit Date: 10/5/2024    Plan: IPR   Discharge Plan       Row Name 10/09/24 1000       Plan    Plan Comments Pt has one more day of Remdesivir. [facility] is following for possible admit once medically ready. CM to follow                   Discharge Codes    No documentation.                 Expected Discharge Date and Time       Expected Discharge Date Expected Discharge Time    Oct 10, 2024               Ela Hylton RN

## 2024-10-09 NOTE — PROGRESS NOTES
[facility] Medicine Services  PROGRESS NOTE    Patient Name: Jonatan Mcguire  : 1950  MRN: [id number]    Date of Admission: 10/5/2024  Primary Care Physician: Provider, No Known    Subjective   Subjective     CC:  Shortness of breath      HPI:  Patient admits continued improvement in shortness of breath as well as his ability to expectorate.  Admits to continued weakness.  Denies fevers, chills, sweats.      Objective   Objective     Vital Signs:   Temp:  [97.6 °F (36.4 °C)-98.3 °F (36.8 °C)] 98.3 °F (36.8 °C)  Heart Rate:  [58-71] 61  Resp:  [18] 18  BP: (118-135)/(60-85) 133/72  Flow (L/min):  [2] 2     Physical Exam:  General appearance: alert, awake, oriented, no acute distress, frail, chronically ill-appearing  Cardiovascular: RRR, no murmurs or rubs, radial pulse full 2/4 BL   Respiratory: Bibasilar rhonchi, no wheezing, oxygenating well on 2 L nasal cannula, no respiratory distress  Abdomen: soft, non-tender, no organomegaly, bowel sounds normoactive    Neuro/CNS: alert and oriented x3, normal speech    Results Reviewed:  LAB RESULTS:      Lab 10/09/24  0622 10/08/24  0706 10/07/24  0711 10/06/24  0644 10/05/24  1343 10/05/24  1129   WBC 10.56 9.52 8.32 8.22  --  9.72   HEMOGLOBIN 11.0* 10.5* 10.4* 10.5*  --  10.9*   HEMATOCRIT 34.3* 32.2* 32.8* 32.8*  --  34.7*   PLATELETS 210 183 155 150  --  162   NEUTROS ABS  --   --  7.19* 6.91  --  7.90*   IMMATURE GRANS (ABS)  --   --  0.05 0.02  --  0.06*   LYMPHS ABS  --   --  0.39* 0.53*  --  0.78   MONOS ABS  --   --  0.68 0.76  --  0.98*   EOS ABS  --   --  0.00 0.00  --  0.00   .5* 101.3* 102.8* 103.8*  --  103.6*   SED RATE  --   --   --   --   --  24*   CRP  --   --   --  4.39*  --  4.58*   PROCALCITONIN  --   --   --   --   --  0.51*   LACTATE  --   --   --   --   --  1.1   LDH  --   --   --   --   --  192   HSTROP T  --   --   --   --  82* 86*         Lab 10/09/24  0622 10/08/24  0706 10/07/24  0711 10/06/24  0644  10/05/24  1129   SODIUM 134* 136 140 139 139   POTASSIUM 5.2 4.9 5.0 4.8 4.5   CHLORIDE 96* 97* 100 102 102   CO2 35.0* 36.0* 36.0* 34.0* 30.0*   ANION GAP 3.0* 3.0* 4.0* 3.0* 7.0   BUN 30* 35* 38* 56* 83*   CREATININE 0.75* 0.79 0.76 1.04 1.19   EGFR 94.1 92.6 93.7 74.9 63.7   GLUCOSE 139* 154* 127* 64* 110*   CALCIUM 8.2* 8.1* 8.5* 8.5* 8.8   MAGNESIUM  --   --   --   --  2.4   TSH  --   --   --   --  1.220         Lab 10/09/24  0622 10/08/24  0706 10/07/24  0711 10/06/24  0644 10/05/24  1129   TOTAL PROTEIN 5.0* 5.0* 5.6* 5.7* 6.0   ALBUMIN 2.9* 2.9* 3.0* 2.9* 3.4*   GLOBULIN 2.1 2.1 2.6 2.8 2.6   ALT (SGPT) 26 25 26 26 31   AST (SGOT) 13 13 28 22 23   BILIRUBIN 0.5 0.4 0.4 0.4 0.6   ALK PHOS 39 37* 39 34* 40         Lab 10/05/24  1343 10/05/24  1129   PROBNP  --  2,706.0*   HSTROP T 82* 86*                 Brief Urine Lab Results       None            Microbiology Results Abnormal       Procedure Component Value - Date/Time    Blood Culture - Blood, Arm, Left [[id number]]  (Normal) Collected: 10/05/24 1155    Lab Status: Preliminary result Specimen: Blood from Arm, Left Updated: 10/08/24 1215     Blood Culture No growth at 3 days    Blood Culture - Blood, Arm, Right [[id number]]  (Normal) Collected: 10/05/24 1155    Lab Status: Preliminary result Specimen: Blood from Arm, Right Updated: 10/08/24 1215     Blood Culture No growth at 3 days    Respiratory Culture - Sputum, Cough [[id number]] Collected: 10/06/24 1724    Lab Status: Preliminary result Specimen: Sputum from Cough Updated: 10/08/24 0924     Respiratory Culture Light growth (2+) The culture consists of normal respiratory gurvinder. This is a preliminary report; final report to follow.     Gram Stain Moderate (3+) WBCs per low power field      Rare (1+) Epithelial cells per low power field      Few (2+) Gram negative bacilli      Few (2+) Gram positive cocci    Legionella Antigen, Urine - Urine, Urine, Clean Catch [[id number]]  (Normal) Collected: 10/05/24 9797     Lab Status: Final result Specimen: Urine, Clean Catch Updated: 10/05/24 2207     LEGIONELLA ANTIGEN, URINE Negative    S. Pneumo Ag Urine or CSF - Urine, Urine, Clean Catch [[id number]]  (Normal) Collected: 10/05/24 1604    Lab Status: Final result Specimen: Urine, Clean Catch Updated: 10/05/24 2206     Strep Pneumo Ag Negative            No radiology results from the last 24 hrs        Current medications:  Scheduled Meds:albuterol sulfate HFA, 2 puff, Inhalation, 4x Daily - RT  budesonide-formoterol, 2 puff, Inhalation, BID - RT  cefTRIAXone, 1,000 mg, Intravenous, Q24H  clotrimazole, 10 mg, Oral, 5x Daily  dexAMETHasone, 6 mg, Oral, Daily With Breakfast  dilTIAZem CD, 360 mg, Oral, Daily  guaiFENesin, 1,200 mg, Oral, Q12H  [Held by provider] hydrALAZINE, 100 mg, Oral, Daily  lisinopril, 20 mg, Oral, Q24H   And  hydroCHLOROthiazide, 25 mg, Oral, Q24H  insulin glargine, 15 Units, Subcutaneous, Nightly  insulin lispro, 2-9 Units, Subcutaneous, 4x Daily AC & at Bedtime  lactobacillus acidophilus, 1 capsule, Oral, Daily  montelukast, 5 mg, Oral, Nightly  [Held by provider] predniSONE, 10 mg, Oral, Daily  remdesivir, 100 mg, Intravenous, Q24H  rivaroxaban, 20 mg, Oral, Daily With Dinner  senna-docusate sodium, 2 tablet, Oral, BID  spironolactone, 25 mg, Oral, Daily  terazosin, 10 mg, Oral, Nightly  tiotropium bromide monohydrate, 2 puff, Inhalation, Daily - RT      Continuous Infusions:   PRN Meds:.  albuterol sulfate HFA    benzonatate    senna-docusate sodium **AND** polyethylene glycol **AND** bisacodyl **AND** bisacodyl    Calcium Replacement - Follow Nurse / BPA Driven Protocol    dextrose    dextrose    glucagon (human recombinant)    Magnesium Standard Dose Replacement - Follow Nurse / BPA Driven Protocol    nitroglycerin    Phosphorus Replacement - Follow Nurse / BPA Driven Protocol    Potassium Replacement - Follow Nurse / BPA Driven Protocol    sodium chloride    Assessment & Plan   Assessment & Plan      Active Hospital Problems    Diagnosis  POA    **COVID [U07.1]  Yes    Community acquired pneumonia of left lower lobe of lung [J18.9]  Unknown      Resolved Hospital Problems   No resolved problems to display.        Brief Hospital Course to date:  Jonatan Mcguire is a 74 y.o. male with history of CKD, vitamin D deficiency, A-fib, asthma-COPD overlap syndrome, chronic hypoxemic respiratory failure (on 2 L nasal cannula during exertion and nocturnally), HTN, obesity, NEW on CPAP, HLD, DM2, who presented for evaluation of shortness of breath, cough, congestion, shortness of breath, wheezing that began on 9/29. He was seen in the ER on 10/2 and diagnosed with COVID.  He declined admission at that time.  His condition continued to worsen leading him to present again to the ER.  He was admitted for acute on chronic hypoxic respiratory failure secondary to COVID-19 pneumonia.  Patient noted to be profoundly weak and evaluated by physical therapy who recommended IPR at discharge.     COVID-19 PNA  LLL CAP  Asthma-COPD overlap syndrome  Acute on chronic hypoxemic respiratory failure  Pulmonary edema  Generalized fatigue/deconditioning   -COVID-positive on 10/2  -Chest x-ray with bilateral infiltrates, worse on the left lower lobe, Pro-Dennis positive  -Concern for secondary bacterial pneumonia as well, continue ceftriaxone, azithromycin (day 5/5)   -Probiotic  -Remdesivir, Decadron (day 5/5)   -Scheduled albuterol  -Continue home inhaler equivalents  -Wean O2 as tolerated  -Airway clearance, flutter valve, incentive spirometry  -Continue chest physiotherapy  -PT/OT recommends rehab   -Improving, expect DC tomorrow to IPR      Oral Thrush-clotrimazole     DM2  -Continue Lantus 15 units nightly w SSI      NEW-CPAP nightly  A-nlh-hdmkolmg diltiazem, Xarelto  HTN-continue doxazosin formulary equivalent, spironolactone, lisinopril, hydrochlorothiazide    Expected Discharge Location and Transportation: IPR  Expected Discharge    Expected Discharge Date: 10/10/2024; Expected Discharge Time:      VTE Prophylaxis:  Pharmacologic & mechanical VTE prophylaxis orders are present.         AM-PAC 6 Clicks Score (PT): 18 (10/08/24 0800)    CODE STATUS:   Code Status and Medical Interventions: CPR (Attempt to Resuscitate); Full Support   Ordered at: 10/05/24 1339     Level Of Support Discussed With:    Patient     Code Status (Patient has no pulse and is not breathing):    CPR (Attempt to Resuscitate)     Medical Interventions (Patient has pulse or is breathing):    Full Support       Shelton Phoenix, DO  10/09/24

## 2024-10-09 NOTE — THERAPY TREATMENT NOTE
Patient Name: Jonatan Mcguire  : 1950    MRN: [id number]                              Today's Date: 10/9/2024       Admit Date: 10/5/2024    Visit Dx:     ICD-10-CM ICD-9-CM   1. COVID-19  U07.1 079.89   2. Acute respiratory failure, unspecified whether with hypoxia or hypercapnia  J96.00 518.81   3. COPD exacerbation  J44.1 491.21   4. Community acquired pneumonia, unspecified laterality  J18.9 486     Patient Active Problem List   Diagnosis    COVID    Community acquired pneumonia of left lower lobe of lung     Past Medical History:   Diagnosis Date    Afib     COPD (chronic obstructive pulmonary disease)     Diabetes mellitus, type II     Hypertension     Obesity     Sleep apnea     Vitamin D deficiency      Past Surgical History:   Procedure Laterality Date    COLON RESECTION      NASAL POLYP EXCISION        General Information       Row Name 10/09/24 1520          Physical Therapy Time and Intention    Document Type therapy note (daily note)  -CK     Mode of Treatment physical therapy;individual therapy  -CK       Row Name 10/09/24 1520          General Information    Patient Profile Reviewed yes  -CK     Existing Precautions/Restrictions fall;oxygen therapy device and L/min  -CK     Barriers to Rehab medically complex  -CK       Row Name 10/09/24 1520          Cognition    Orientation Status (Cognition) oriented x 3  -CK       Row Name 10/09/24 1520          Safety Issues, Functional Mobility    Safety Issues Affecting Function (Mobility) awareness of need for assistance;insight into deficits/self-awareness;positioning of assistive device;safety precaution awareness;safety precautions follow-through/compliance  -CK     Impairments Affecting Function (Mobility) balance;endurance/activity tolerance;shortness of breath;strength;sensation/sensory awareness  -CK               User Key  (r) = Recorded By, (t) = Taken By, (c) = Cosigned By      Initials Name Provider Type    CK Shelia Goodman, PT Physical  Therapist                   Mobility       Row Name 10/09/24 1520          Bed Mobility    Comment, (Bed Mobility) [facility] pre/post treatment  -CK       Row Name 10/09/24 1520          Sit-Stand Transfer    Sit-Stand [place] (Transfers) minimum assist (75% patient effort);1 person assist;verbal cues  -CK     Assistive Device (Sit-Stand Transfers) walker, front-wheeled  -CK     Comment, (Sit-Stand Transfer) cues to push up from chair/bedside commode  -CK       Row Name 10/09/24 1520          Gait/Stairs (Locomotion)    [place] Level (Gait) minimum assist (75% patient effort);verbal cues;nonverbal cues (demo/gesture)  -CK     Assistive Device (Gait) walker, front-wheeled  -CK     Distance in Feet (Gait) 25  -CK     Deviations/Abnormal Patterns (Gait) bilateral deviations;base of support, wide;ha decreased;gait speed decreased;stride length decreased  -CK     Bilateral Gait Deviations forward flexed posture;heel strike decreased  -CK     Comment, (Gait/Stairs) Patient ambulated in room with step through gait pattern while on 3L O2. Cues provided for upright posture with safe proximity of AD (Closer to CLEM). Patient limited by SOA with SpO2 desaturation to 86%, cues for PLB and prolonged seated rest break required to recover.  -CK               User Key  (r) = Recorded By, (t) = Taken By, (c) = Cosigned By      Initials Name Provider Type    CK Shelia Goodman PT Physical Therapist                   Obj/Interventions       Row Name 10/09/24 1522          Motor Skills    Therapeutic Exercise other (see comments)  IS and ian x10  -CK       Row Name 10/09/24 1522          Balance    Balance Assessment sitting static balance;standing static balance;standing dynamic balance  -CK     Static Sitting Balance supervision  -CK     Position, Sitting Balance unsupported;sitting in chair;other (see comments)  on bedside commode  -CK     Static Standing Balance contact guard  -CK     Dynamic Standing Balance  minimal assist  -CK     Position/Device Used, Standing Balance supported;walker, front-wheeled  -CK     Comment, Balance unsteady due to unsafe proximity of AD  -CK               User Key  (r) = Recorded By, (t) = Taken By, (c) = Cosigned By      Initials Name Provider Type    CK Shelia Goodman PT Physical Therapist                   Goals/Plan    No documentation.                  Clinical Impression       Row Name 10/09/24 1522          Pain    Pretreatment Pain Rating 0/10 - no pain  -CK     Posttreatment Pain Rating 0/10 - no pain  -CK       Row Name 10/09/24 1522          Plan of Care Review    Plan of Care Reviewed With patient  -CK     Progress no change  -CK     Outcome Evaluation Patient remains limited by deficits in strength, endurance, and balance with mobility primarily limited by SOA with SpO2 desaturation to 86% on 3L O2. IPPT remains indicated to address current deficits. Continue to recommend D/C to IPR.  -CK       Row Name 10/09/24 1522          Vital Signs    Pre Systolic BP Rehab 120  -CK     Pre Treatment Diastolic BP 56  -CK     Pretreatment Heart Rate (beats/min) 61  -CK     Posttreatment Heart Rate (beats/min) 64  -CK     Pre SpO2 (%) 94  -CK     O2 Delivery Pre Treatment nasal cannula  -CK     Intra SpO2 (%) 86  -CK     O2 Delivery Intra Treatment nasal cannula  -CK     Post SpO2 (%) 90  -CK     O2 Delivery Post Treatment nasal cannula  -CK     Pre Patient Position Sitting  -CK     Intra Patient Position Sitting  -CK     Post Patient Position Sitting  -CK       Row Name 10/09/24 1522          Positioning and Restraints    Pre-Treatment Position sitting in chair/recliner  -CK     Post Treatment Position chair  -CK     In Chair reclined;call light within reach;encouraged to call for assist;exit alarm on;waffle cushion;heels elevated;notified nsg  -CK               User Key  (r) = Recorded By, (t) = Taken By, (c) = Cosigned By      Initials Name Provider Type    Shelia Sierra, PT  Physical Therapist                   Outcome Measures       Row Name 10/09/24 1524          How much help from another person do you currently need...    Turning from your back to your side while in flat bed without using bedrails? 3  -CK     Moving from lying on back to sitting on the side of a flat bed without bedrails? 3  -CK     Moving to and from a bed to a chair (including a wheelchair)? 3  -CK     Standing up from a chair using your arms (e.g., wheelchair, bedside chair)? 3  -CK     Climbing 3-5 steps with a railing? 2  -CK     To walk in hospital room? 3  -CK     AM-PAC 6 Clicks Score (PT) 17  -CK     Highest Level of Mobility Goal 5 --> Static standing  -CK       Row Name 10/09/24 1524          Functional Assessment    Outcome Measure Options AM-PAC 6 Clicks Basic Mobility (PT)  -               User Key  (r) = Recorded By, (t) = Taken By, (c) = Cosigned By      Initials Name Provider Type    Shelia Sierra, PT Physical Therapist                                 Physical Therapy Education       Title: PT OT SLP Therapies (In Progress)       Topic: Physical Therapy (Done)       Point: Mobility training (Done)       Learning Progress Summary             Patient Acceptance, E, VU by  at 10/9/2024 1524    Acceptance, E,D, VU,DU by  at 10/7/2024 1046                         Point: Home exercise program (Done)       Learning Progress Summary             Patient Acceptance, E,D, VU,DU by  at 10/7/2024 1046                         Point: Body mechanics (Done)       Learning Progress Summary             Patient Acceptance, E, VU by  at 10/9/2024 1524    Acceptance, E,D, VU,DU by  at 10/7/2024 1046                         Point: Precautions (Done)       Learning Progress Summary             Patient Acceptance, E, VU by  at 10/9/2024 1524    Acceptance, E,D, VU,DU by  at 10/7/2024 1046                                         User Key       Initials Effective Dates Name Provider Type Discipline     HW 12/15/23 -  Adrianna Vitale, PT Physical Therapist PT    CK 02/06/24 -  Shelia Goodman PT Physical Therapist PT                  PT Recommendation and Plan     Plan of Care Reviewed With: patient  Progress: no change  Outcome Evaluation: Patient remains limited by deficits in strength, endurance, and balance with mobility primarily limited by SOA with SpO2 desaturation to 86% on 3L O2. IPPT remains indicated to address current deficits. Continue to recommend D/C to IPR.     Time Calculation:         PT Charges       Row Name 10/09/24 1524             Time Calculation    Start Time 1449  -CK      PT Received On 10/09/24  -CK         Timed Charges    35323 - Gait Training Minutes  10  -CK      94960 - PT Therapeutic Activity Minutes 18  -CK         Total Minutes    Timed Charges Total Minutes 28  -CK       Total Minutes 28  -CK                User Key  (r) = Recorded By, (t) = Taken By, (c) = Cosigned By      Initials Name Provider Type    CK Shelia Goodman, PT Physical Therapist                  Therapy Charges for Today       Code Description Service Date Service Provider Modifiers Qty    93899581265 HC GAIT TRAINING EA 15 MIN 10/9/2024 Shelia Goodman, PT GP 1    68441910400 HC PT THERAPEUTIC ACT EA 15 MIN 10/9/2024 Shelia Goodman, PT GP 1            PT G-Codes  Outcome Measure Options: AM-PAC 6 Clicks Basic Mobility (PT)  AM-PAC 6 Clicks Score (PT): 17  AM-PAC 6 Clicks Score (OT): 16  PT Discharge Summary  Anticipated Discharge Disposition (PT): inpatient rehabilitation facility    Shelia Goodman PT  10/9/2024

## 2024-10-10 VITALS
RESPIRATION RATE: 18 BRPM | HEART RATE: 62 BPM | HEIGHT: 72 IN | OXYGEN SATURATION: 94 % | DIASTOLIC BLOOD PRESSURE: 64 MMHG | TEMPERATURE: 97.8 F | SYSTOLIC BLOOD PRESSURE: 124 MMHG | BODY MASS INDEX: 33.15 KG/M2 | WEIGHT: 244.71 LBS

## 2024-10-10 PROBLEM — J12.82 PNEUMONIA DUE TO COVID-19 VIRUS: Status: ACTIVE | Noted: 2024-10-10

## 2024-10-10 PROBLEM — E87.5 HYPERKALEMIA: Status: ACTIVE | Noted: 2024-10-10

## 2024-10-10 PROBLEM — U07.1 PNEUMONIA DUE TO COVID-19 VIRUS: Status: RESOLVED | Noted: 2024-10-10 | Resolved: 2024-10-10

## 2024-10-10 PROBLEM — J18.9 COMMUNITY ACQUIRED PNEUMONIA OF LEFT LOWER LOBE OF LUNG: Status: RESOLVED | Noted: 2024-10-05 | Resolved: 2024-10-10

## 2024-10-10 PROBLEM — U07.1 COVID: Status: RESOLVED | Noted: 2024-10-05 | Resolved: 2024-10-10

## 2024-10-10 PROBLEM — U07.1 PNEUMONIA DUE TO COVID-19 VIRUS: Status: ACTIVE | Noted: 2024-10-10

## 2024-10-10 PROBLEM — D89.832 CYTOKINE RELEASE SYNDROME, GRADE 2: Status: RESOLVED | Noted: 2024-10-10 | Resolved: 2024-10-10

## 2024-10-10 PROBLEM — J12.82 PNEUMONIA DUE TO COVID-19 VIRUS: Status: RESOLVED | Noted: 2024-10-10 | Resolved: 2024-10-10

## 2024-10-10 PROBLEM — D89.832 CYTOKINE RELEASE SYNDROME, GRADE 2: Status: ACTIVE | Noted: 2024-10-10

## 2024-10-10 LAB
ALBUMIN SERPL-MCNC: 3 G/DL (ref 3.5–5.2)
ALBUMIN/GLOB SERPL: 1.1 G/DL
ALP SERPL-CCNC: 46 U/L (ref 39–117)
ALT SERPL W P-5'-P-CCNC: 27 U/L (ref 1–41)
ANION GAP SERPL CALCULATED.3IONS-SCNC: 6 MMOL/L (ref 5–15)
AST SERPL-CCNC: 16 U/L (ref 1–40)
BACTERIA SPEC AEROBE CULT: NORMAL
BACTERIA SPEC AEROBE CULT: NORMAL
BASOPHILS # BLD AUTO: 0.02 10*3/MM3 (ref 0–0.2)
BASOPHILS NFR BLD AUTO: 0.1 % (ref 0–1.5)
BILIRUB SERPL-MCNC: 0.6 MG/DL (ref 0–1.2)
BUN SERPL-MCNC: 28 MG/DL (ref 8–23)
BUN/CREAT SERPL: 36.8 (ref 7–25)
CALCIUM SPEC-SCNC: 8.4 MG/DL (ref 8.6–10.5)
CHLORIDE SERPL-SCNC: 93 MMOL/L (ref 98–107)
CO2 SERPL-SCNC: 35 MMOL/L (ref 22–29)
CREAT SERPL-MCNC: 0.76 MG/DL (ref 0.76–1.27)
DEPRECATED RDW RBC AUTO: 50.9 FL (ref 37–54)
EGFRCR SERPLBLD CKD-EPI 2021: 93.7 ML/MIN/1.73
EOSINOPHIL # BLD AUTO: 0 10*3/MM3 (ref 0–0.4)
EOSINOPHIL NFR BLD AUTO: 0 % (ref 0.3–6.2)
ERYTHROCYTE [DISTWIDTH] IN BLOOD BY AUTOMATED COUNT: 13.8 % (ref 12.3–15.4)
GLOBULIN UR ELPH-MCNC: 2.7 GM/DL
GLUCOSE BLDC GLUCOMTR-MCNC: 172 MG/DL (ref 70–130)
GLUCOSE BLDC GLUCOMTR-MCNC: 231 MG/DL (ref 70–130)
GLUCOSE SERPL-MCNC: 258 MG/DL (ref 65–99)
HCT VFR BLD AUTO: 37.6 % (ref 37.5–51)
HGB BLD-MCNC: 12.3 G/DL (ref 13–17.7)
IMM GRANULOCYTES # BLD AUTO: 0.12 10*3/MM3 (ref 0–0.05)
IMM GRANULOCYTES NFR BLD AUTO: 0.8 % (ref 0–0.5)
LYMPHOCYTES # BLD AUTO: 0.3 10*3/MM3 (ref 0.7–3.1)
LYMPHOCYTES NFR BLD AUTO: 1.9 % (ref 19.6–45.3)
MCH RBC QN AUTO: 32.7 PG (ref 26.6–33)
MCHC RBC AUTO-ENTMCNC: 32.7 G/DL (ref 31.5–35.7)
MCV RBC AUTO: 100 FL (ref 79–97)
MONOCYTES # BLD AUTO: 0.95 10*3/MM3 (ref 0.1–0.9)
MONOCYTES NFR BLD AUTO: 6.2 % (ref 5–12)
NEUTROPHILS NFR BLD AUTO: 14.03 10*3/MM3 (ref 1.7–7)
NEUTROPHILS NFR BLD AUTO: 91 % (ref 42.7–76)
NRBC BLD AUTO-RTO: 0 /100 WBC (ref 0–0.2)
PLATELET # BLD AUTO: 236 10*3/MM3 (ref 140–450)
PMV BLD AUTO: 9.3 FL (ref 6–12)
POTASSIUM SERPL-SCNC: 5.5 MMOL/L (ref 3.5–5.2)
PROT SERPL-MCNC: 5.7 G/DL (ref 6–8.5)
RBC # BLD AUTO: 3.76 10*6/MM3 (ref 4.14–5.8)
SODIUM SERPL-SCNC: 134 MMOL/L (ref 136–145)
WBC NRBC COR # BLD AUTO: 15.42 10*3/MM3 (ref 3.4–10.8)

## 2024-10-10 PROCEDURE — 94664 DEMO&/EVAL PT USE INHALER: CPT

## 2024-10-10 PROCEDURE — 82948 REAGENT STRIP/BLOOD GLUCOSE: CPT

## 2024-10-10 PROCEDURE — 94799 UNLISTED PULMONARY SVC/PX: CPT

## 2024-10-10 PROCEDURE — 99239 HOSP IP/OBS DSCHRG MGMT >30: CPT | Performed by: STUDENT IN AN ORGANIZED HEALTH CARE EDUCATION/TRAINING PROGRAM

## 2024-10-10 PROCEDURE — 94668 MNPJ CHEST WALL SBSQ: CPT

## 2024-10-10 PROCEDURE — 80053 COMPREHEN METABOLIC PANEL: CPT | Performed by: STUDENT IN AN ORGANIZED HEALTH CARE EDUCATION/TRAINING PROGRAM

## 2024-10-10 PROCEDURE — 85025 COMPLETE CBC W/AUTO DIFF WBC: CPT | Performed by: STUDENT IN AN ORGANIZED HEALTH CARE EDUCATION/TRAINING PROGRAM

## 2024-10-10 PROCEDURE — 63710000001 DEXAMETHASONE PER 0.25 MG: Performed by: STUDENT IN AN ORGANIZED HEALTH CARE EDUCATION/TRAINING PROGRAM

## 2024-10-10 PROCEDURE — 63710000001 INSULIN LISPRO (HUMAN) PER 5 UNITS: Performed by: STUDENT IN AN ORGANIZED HEALTH CARE EDUCATION/TRAINING PROGRAM

## 2024-10-10 RX ORDER — GUAIFENESIN 600 MG/1
1200 TABLET, EXTENDED RELEASE ORAL EVERY 12 HOURS SCHEDULED
Start: 2024-10-10 | End: 2024-11-09

## 2024-10-10 RX ORDER — BENZONATATE 100 MG/1
100 CAPSULE ORAL 3 TIMES DAILY PRN
Start: 2024-10-10

## 2024-10-10 RX ORDER — SPIRONOLACTONE 25 MG/1
12.5 TABLET ORAL DAILY
Start: 2024-10-12

## 2024-10-10 RX ORDER — PREDNISONE 10 MG/1
10 TABLET ORAL DAILY
COMMUNITY
Start: 2024-09-05 | End: 2025-01-08

## 2024-10-10 RX ORDER — CLOTRIMAZOLE 10 MG/1
10 LOZENGE ORAL
Qty: 15 TABLET | Refills: 0 | Status: SHIPPED | OUTPATIENT
Start: 2024-10-10 | End: 2024-10-13

## 2024-10-10 RX ADMIN — ALBUTEROL SULFATE 2 PUFF: 90 AEROSOL, METERED RESPIRATORY (INHALATION) at 12:34

## 2024-10-10 RX ADMIN — INSULIN LISPRO 2 UNITS: 100 INJECTION, SOLUTION INTRAVENOUS; SUBCUTANEOUS at 08:50

## 2024-10-10 RX ADMIN — SPIRONOLACTONE 25 MG: 25 TABLET ORAL at 08:51

## 2024-10-10 RX ADMIN — DEXAMETHASONE 6 MG: 4 TABLET ORAL at 08:51

## 2024-10-10 RX ADMIN — CLOTRIMAZOLE 10 MG: 10 LOZENGE ORAL at 06:13

## 2024-10-10 RX ADMIN — INSULIN LISPRO 4 UNITS: 100 INJECTION, SOLUTION INTRAVENOUS; SUBCUTANEOUS at 12:34

## 2024-10-10 RX ADMIN — CLOTRIMAZOLE 10 MG: 10 LOZENGE ORAL at 15:34

## 2024-10-10 RX ADMIN — CLOTRIMAZOLE 10 MG: 10 LOZENGE ORAL at 12:34

## 2024-10-10 RX ADMIN — LISINOPRIL 20 MG: 20 TABLET ORAL at 08:51

## 2024-10-10 RX ADMIN — BUDESONIDE AND FORMOTEROL FUMARATE DIHYDRATE 2 PUFF: 160; 4.5 AEROSOL RESPIRATORY (INHALATION) at 08:42

## 2024-10-10 RX ADMIN — SENNOSIDES AND DOCUSATE SODIUM 2 TABLET: 50; 8.6 TABLET ORAL at 08:51

## 2024-10-10 RX ADMIN — ALBUTEROL SULFATE 2 PUFF: 90 AEROSOL, METERED RESPIRATORY (INHALATION) at 08:42

## 2024-10-10 RX ADMIN — ALBUTEROL SULFATE 2 PUFF: 90 AEROSOL, METERED RESPIRATORY (INHALATION) at 02:24

## 2024-10-10 RX ADMIN — GUAIFENESIN 1200 MG: 600 TABLET, EXTENDED RELEASE ORAL at 08:51

## 2024-10-10 RX ADMIN — HYDROCHLOROTHIAZIDE 25 MG: 25 TABLET ORAL at 08:51

## 2024-10-10 RX ADMIN — Medication 1 CAPSULE: at 08:51

## 2024-10-10 RX ADMIN — DILTIAZEM HYDROCHLORIDE 360 MG: 180 CAPSULE, EXTENDED RELEASE ORAL at 08:50

## 2024-10-10 RX ADMIN — ALBUTEROL SULFATE 2 PUFF: 90 AEROSOL, METERED RESPIRATORY (INHALATION) at 15:33

## 2024-10-10 RX ADMIN — BISACODYL 5 MG: 5 TABLET, COATED ORAL at 08:51

## 2024-10-10 RX ADMIN — TIOTROPIUM BROMIDE INHALATION SPRAY 2 PUFF: 3.12 SPRAY, METERED RESPIRATORY (INHALATION) at 08:42

## 2024-10-10 NOTE — DISCHARGE SUMMARY
[facility] Medicine Services  DISCHARGE SUMMARY    Patient Name: Jonatan Mcguire  : 1950  MRN: [id number]    Date of Admission: 10/5/2024 10:53 AM  Date of Discharge:  10/10/2024  Primary Care Physician: Provider, No Known    Consults       No orders found from 2024 to 10/6/2024.            Hospital Course     Presenting Problem: Acute on chronic hypoxic respiratory failure secondary to COVID-19 pneumonia    Active Hospital Problems    Diagnosis  POA    Hyperkalemia [E87.5]  No      Resolved Hospital Problems    Diagnosis Date Resolved POA    **COVID [U07.1] 10/10/2024 Yes    Cytokine release syndrome, grade 2 [D89.832] 10/10/2024 No    Pneumonia due to COVID-19 virus [U07.1, J12.82] 10/10/2024 Yes    Community acquired pneumonia of left lower lobe of lung [J18.9] 10/10/2024 Yes          Hospital Course:  Jonatan Mcguire is a 74 y.o. male with past medical history significant for COPD, diabetes, hypertension, was admitted for acute on chronic hypoxic respiratory failure secondary to COVID-19 pneumonia; symptoms initially began on .  Patient was treated with 5 days of remdesivir and Rocephin due to concern for superimposed bacterial pneumonia, as well as Decadron with improvement.  He was able to be weaned down to his baseline 2 L nasal cannula.  Patient was evaluated by physical therapy and Occupational Therapy, determined to benefit from rehab upon discharge, to which she was agreeable.    COVID-19 PNA  Acute left lower lobe community-acquired pneumonia  Asthma-COPD overlap syndrome  Acute on chronic hypoxemic respiratory failure  Pulmonary edema  Generalized fatigue/deconditioning   -COVID positive on 10/2  -Chest x-ray with bilateral infiltrates, worse on the left lower lobe, Pro-Dennis positive  -Concern for secondary bacterial pneumonia as well, completed 5 days of ceftriaxone, azithromycin   -Completed 5 days of remdesivir and 6 days of Decadron  -Continue home inhaler  equivalents  -Back on baseline 2 L nasal cannula  -Airway clearance, flutter valve, incentive spirometry  -Continue chest physiotherapy  -PT/OT recommends rehab     Hyperkalemia  -Hold spironolactone on discharge until 10/12, recommend repeat BMP next week     Oral Thrush-clotrimazole     DM2  -C resume home insulin regimen on discharge     NEW-CPAP nightly  X-jxw-gvzhumlr diltiazem, Xarelto  HTN-continue doxazosin formulary equivalent, lisinopril-hydrochlorothiazide.  Hold spironolactone as above      Discharge Follow Up Recommendations for outpatient labs/diagnostics:  Follow-up with PCP in 1 week    Day of Discharge     HPI:   Patient reports improvement in his breathing today, less labored.  He states he has a productive cough that feels more in his upper throat rather than deep in his chest.  Denies fevers, chills, sweats.  Agreeable to go to rehab today.    Vital Signs:   Temp:  [97.6 °F (36.4 °C)-98.6 °F (37 °C)] 97.8 °F (36.6 °C)  Heart Rate:  [52-75] 62  Resp:  [16-18] 18  BP: ()/(56-86) 124/64  Flow (L/min):  [2-3] 3      Physical Exam:  General appearance: alert, awake, oriented, no acute distress, frail, chronically ill-appearing  Cardiovascular: RRR, no murmurs or rubs  Respiratory: Diminished breath sounds throughout, trace bibasilar rhonchi, no wheezing, oxygenating well on 2 L nasal cannula, no respiratory distress  Abdomen: soft, non-tender, no organomegaly, bowel sounds normoactive    Neuro/CNS: alert and oriented x3, normal speech    Pertinent  and/or Most Recent Results     LAB RESULTS:      Lab 10/10/24  1103 10/09/24  0622 10/08/24  0706 10/07/24  0711 10/06/24  0644 10/05/24  1129   WBC 15.42* 10.56 9.52 8.32 8.22 9.72   HEMOGLOBIN 12.3* 11.0* 10.5* 10.4* 10.5* 10.9*   HEMATOCRIT 37.6 34.3* 32.2* 32.8* 32.8* 34.7*   PLATELETS 236 210 183 155 150 162   NEUTROS ABS 14.03*  --   --  7.19* 6.91 7.90*   IMMATURE GRANS (ABS) 0.12*  --   --  0.05 0.02 0.06*   LYMPHS ABS 0.30*  --   --  0.39*  0.53* 0.78   MONOS ABS 0.95*  --   --  0.68 0.76 0.98*   EOS ABS 0.00  --   --  0.00 0.00 0.00   .0* 101.5* 101.3* 102.8* 103.8* 103.6*   SED RATE  --   --   --   --   --  24*   CRP  --   --   --   --  4.39* 4.58*   PROCALCITONIN  --   --   --   --   --  0.51*   LACTATE  --   --   --   --   --  1.1   LDH  --   --   --   --   --  192         Lab 10/10/24  1103 10/09/24  0622 10/08/24  0706 10/07/24  0711 10/06/24  0644 10/05/24  1129   SODIUM 134* 134* 136 140 139 139   POTASSIUM 5.5* 5.2 4.9 5.0 4.8 4.5   CHLORIDE 93* 96* 97* 100 102 102   CO2 35.0* 35.0* 36.0* 36.0* 34.0* 30.0*   ANION GAP 6.0 3.0* 3.0* 4.0* 3.0* 7.0   BUN 28* 30* 35* 38* 56* 83*   CREATININE 0.76 0.75* 0.79 0.76 1.04 1.19   EGFR 93.7 94.1 92.6 93.7 74.9 63.7   GLUCOSE 258* 139* 154* 127* 64* 110*   CALCIUM 8.4* 8.2* 8.1* 8.5* 8.5* 8.8   MAGNESIUM  --   --   --   --   --  2.4   TSH  --   --   --   --   --  1.220         Lab 10/10/24  1103 10/09/24  0622 10/08/24  0706 10/07/24  0711 10/06/24  0644   TOTAL PROTEIN 5.7* 5.0* 5.0* 5.6* 5.7*   ALBUMIN 3.0* 2.9* 2.9* 3.0* 2.9*   GLOBULIN 2.7 2.1 2.1 2.6 2.8   ALT (SGPT) 27 26 25 26 26   AST (SGOT) 16 13 13 28 22   BILIRUBIN 0.6 0.5 0.4 0.4 0.4   ALK PHOS 46 39 37* 39 34*         Lab 10/05/24  1343 10/05/24  1129   PROBNP  --  2,706.0*   HSTROP T 82* 86*                 Brief Urine Lab Results       None          Microbiology Results (last 10 days)       Procedure Component Value - Date/Time    Respiratory Culture - Sputum, Cough [[id number]] Collected: 10/06/24 1724    Lab Status: Final result Specimen: Sputum from Cough Updated: 10/09/24 1205     Respiratory Culture Light growth (2+) Normal respiratory gurvinder. No S. aureus or Pseudomonas aeruginosa detected. Final report.     Gram Stain Moderate (3+) WBCs per low power field      Rare (1+) Epithelial cells per low power field      Few (2+) Gram negative bacilli      Few (2+) Gram positive cocci    Legionella Antigen, Urine - Urine, Urine, Clean  Catch [[id number]]  (Normal) Collected: 10/05/24 1604    Lab Status: Final result Specimen: Urine, Clean Catch Updated: 10/05/24 2207     LEGIONELLA ANTIGEN, URINE Negative    S. Pneumo Ag Urine or CSF - Urine, Urine, Clean Catch [[id number]]  (Normal) Collected: 10/05/24 1604    Lab Status: Final result Specimen: Urine, Clean Catch Updated: 10/05/24 2206     Strep Pneumo Ag Negative    Blood Culture - Blood, Arm, Left [[id number]]  (Normal) Collected: 10/05/24 1155    Lab Status: Final result Specimen: Blood from Arm, Left Updated: 10/10/24 1215     Blood Culture No growth at 5 days    Blood Culture - Blood, Arm, Right [[id number]]  (Normal) Collected: 10/05/24 1155    Lab Status: Final result Specimen: Blood from Arm, Right Updated: 10/10/24 1215     Blood Culture No growth at 5 days    COVID PRE-OP / PRE-PROCEDURE SCREENING ORDER (NO ISOLATION) - Swab, Nasopharynx [[id number]]  (Abnormal) Collected: 10/02/24 0004    Lab Status: Final result Specimen: Swab from Nasopharynx Updated: 10/02/24 0238    Narrative:      The following orders were created for panel order COVID PRE-OP / PRE-PROCEDURE SCREENING ORDER (NO ISOLATION) - Swab, Nasopharynx.  Procedure                               Abnormality         Status                     ---------                               -----------         ------                     COVID-19 and FLU A/B PCR...[[id number]]  Abnormal            Final result                 Please view results for these tests on the individual orders.    COVID-19 and FLU A/B PCR, 1 HR TAT - Swab, Nasopharynx [[id number]]  (Abnormal) Collected: 10/02/24 0004    Lab Status: Final result Specimen: Swab from Nasopharynx Updated: 10/02/24 0238     COVID19 Detected     Influenza A PCR Not Detected     Influenza B PCR Not Detected    Narrative:      Fact sheet for providers: https://www.fda.gov/media/389431/download    Fact sheet for patients: https://www.fda.gov/media/540533/download    Test performed by  PCR.  Influenza A and Influenza B negative results should be considered presumptive in samples that have a positive SARS-CoV-2 result.    Competitive inhibition studies showed that SARS-CoV-2 virus, when present at concentrations above 3.6E+04 copies/mL, can inhibit the detection and amplification of influenza A and influenza B virus RNA if present at or below 1.8E+02 copies/mL or 4.9E+02 copies/mL, respectively, and may lead to false negative influenza virus results. If co-infection with influenza A or influenza B virus is suspected in samples with a positive SARS-CoV-2 result, the sample should be re-tested with another FDA cleared, approved, or authorized influenza test, if influenza virus detection would change clinical management.            XR Chest 1 View    Result Date: 10/5/2024  XR CHEST 1 VW Date of Exam: 10/5/2024 11:16 AM EDT Indication: SOA triage protocol Comparison: 10/1/2024 at 2205 hours Findings: Worsening infiltrate is noted within the left lower lobe. There is a small left pleural effusion. There are additional airspace infiltrates bilaterally with interstitial changes. The cardiac silhouette and mediastinum are stable.     Impression: Worsening infiltrate within the left lower lobe. Additional infiltrates are seen throughout the lungs bilaterally with scattered interstitial changes. There is also a small left pleural effusion. The findings suggest progressing atypical/viral infection or multifocal pneumonia versus worsening pulmonary edema. Electronically Signed: Andrea Vallejo MD  10/5/2024 11:23 AM EDT  Workstation ID: [id number]    XR Chest 1 View    Result Date: 10/1/2024  XR CHEST 1 VW Date of Exam: 10/1/2024 10:05 PM EDT Indication: short of breath Comparison: 9/1/2024 at 2010 hours Findings: Worsening ill-defined airspace opacities are seen throughout the mid to lower lungs bilaterally. Interstitial changes are noted. There is a small left pleural effusion. The cardiac silhouette and  mediastinum are stable. Cardiomegaly is noted. No acute osseous abnormalities are seen.     Impression: 1.Worsening ill-defined airspace infiltrates are noted within the mid to lower lungs bilaterally. Interstitial changes are seen bilaterally. There is also a small left pleural effusion. The findings may indicate worsening CHF and pulmonary edema. Developing atypical/viral infection or multifocal pneumonia could also be considered. Electronically Signed: Andrea Vallejo MD  10/1/2024 10:18 PM EDT  Workstation ID: [id number]                 Plan for Follow-up of Pending Labs/Results:     Discharge Details        Discharge Medications        New Medications        Instructions Start Date   benzonatate 100 MG capsule  Commonly known as: TESSALON   100 mg, Oral, 3 Times Daily PRN      clotrimazole 10 MG jocelyn  Commonly known as: MYCELEX   10 mg, Oral, 5 Times Daily      guaiFENesin 600 MG 12 hr tablet  Commonly known as: MUCINEX   1,200 mg, Oral, Every 12 Hours Scheduled             Changes to Medications        Instructions Start Date   predniSONE 10 MG tablet  Commonly known as: DELTASONE  What changed: Another medication with the same name was removed. Continue taking this medication, and follow the directions you see here.   10 mg, Oral, Daily      spironolactone 25 MG tablet  Commonly known as: ALDACTONE  What changed: These instructions start on October 12, 2024. If you are unsure what to do until then, ask your doctor or other care provider.   12.5 mg, Oral, Daily   Start Date: October 12, 2024            Continue These Medications        Instructions Start Date   acetaminophen 500 MG tablet  Commonly known as: TYLENOL   500 mg, Oral, Every 6 Hours PRN      Advair Diskus 250-50 MCG/ACT DISKUS  Generic drug: Fluticasone-Salmeterol   2 puffs, Inhalation, 2 Times Daily - RT      albuterol sulfate  (90 Base) MCG/ACT inhaler  Commonly known as: PROVENTIL HFA;VENTOLIN HFA;PROAIR HFA   1 puff, Inhalation, Every 4  Hours PRN      dilTIAZem  MG 24 hr capsule  Commonly known as: CARDIZEM CD   360 mg, Oral, Daily      doxazosin 8 MG tablet  Commonly known as: CARDURA   8 mg, Oral, Nightly      glipizide 5 MG ER tablet  Commonly known as: GLUCOTROL XL   5 mg, Oral, Daily      hydrALAZINE 100 MG tablet  Commonly known as: APRESOLINE   100 mg, Oral, Daily      insulin glargine 100 UNIT/ML injection  Commonly known as: LANTUS, SEMGLEE   17 Units, Subcutaneous, Nightly      levalbuterol 1.25 MG/3ML nebulizer solution  Commonly known as: XOPENEX   1 ampule, Nebulization, Every 4 Hours PRN      lisinopril-hydrochlorothiazide 20-25 MG per tablet  Commonly known as: PRINZIDE,ZESTORETIC   1 tablet, Oral, Daily      montelukast 10 MG tablet  Commonly known as: SINGULAIR   10 mg, Oral, Nightly      Prenatal 27-1 27-1 MG tablet tablet   1 tablet, Oral, Daily      rivaroxaban 20 MG tablet  Commonly known as: XARELTO   20 mg, Oral, Daily      tiotropium 18 MCG per inhalation capsule  Commonly known as: SPIRIVA   1 capsule, Inhalation, Daily - RT               Allergies   Allergen Reactions    Tetanus Toxoids Unknown - Low Severity         Discharge Disposition:  Rehab Facility or Unit (DC - External)    Diet:  Hospital:  Diet Order   Procedures    Diet: Cardiac, Diabetic; Healthy Heart (2-3 Na+); Consistent Carbohydrate; Fluid Consistency: Thin (IDDSI 0)            Activity: As tolerated      Restrictions or Other Recommendations:         CODE STATUS:    Code Status and Medical Interventions: CPR (Attempt to Resuscitate); Full Support   Ordered at: 10/05/24 2205     Level Of Support Discussed With:    Patient     Code Status (Patient has no pulse and is not breathing):    CPR (Attempt to Resuscitate)     Medical Interventions (Patient has pulse or is breathing):    Full Support       No future appointments.    Additional Instructions for the Follow-ups that You Need to Schedule       Discharge Follow-up with PCP   As directed        Currently Documented PCP:    Provider, No Known    PCP Phone Number:    None     Follow Up Details: 1 week                      Shelton Phoenix DO  10/10/24      Time Spent on Discharge:  I spent  45  minutes on this discharge activity which included: face-to-face encounter with the patient, reviewing the data in the system, coordination of the care with the nursing staff as well as consultants, documentation, and entering orders.

## 2024-10-10 NOTE — PLAN OF CARE
Problem: Adult Inpatient Plan of Care  Goal: Plan of Care Review  Outcome: Progressing  Goal: Patient-Specific Goal (Individualized)  Outcome: Progressing  Goal: Absence of Hospital-Acquired Illness or Injury  Outcome: Progressing  Intervention: Identify and Manage Fall Risk  Description: Perform standard risk assessment on admission using a validated tool or comprehensive approach appropriate to the patient; reassess fall risk frequently, with change in status or transfer to another level of care.  Communicate fall injury risk to interprofessional healthcare team.  Determine need for increased observation, equipment and environmental modification, such as low bed, signage and supportive, nonskid footwear.  Adjust safety measures to individual developmental age, stage and identified risk factors.  Reinforce the importance of safety and physical activity with patient and family.  Perform regular intentional rounding to assess need for position change, pain assessment and personal needs, including assistance with toileting.  Recent Flowsheet Documentation  Taken 10/10/2024 0410 by Madison Nunez, RN  Safety Promotion/Fall Prevention:   activity supervised   mobility aid in reach   fall prevention program maintained   nonskid shoes/slippers when out of bed   room organization consistent   safety round/check completed   toileting scheduled  Taken 10/10/2024 0200 by Madison Nnuez, RN  Safety Promotion/Fall Prevention:   activity supervised   mobility aid in reach   fall prevention program maintained   nonskid shoes/slippers when out of bed   room organization consistent   safety round/check completed   toileting scheduled  Taken 10/10/2024 0000 by Madison Nunez, RN  Safety Promotion/Fall Prevention:   activity supervised   assistive device/personal items within reach   fall prevention program maintained   lighting adjusted   mobility aid in reach   gait belt   nonskid shoes/slippers when out of bed   room  organization consistent   safety round/check completed   toileting scheduled  Taken 10/9/2024 2330 by Madison Nunez RN  Safety Promotion/Fall Prevention:   activity supervised   assistive device/personal items within reach   fall prevention program maintained   lighting adjusted   mobility aid in reach   gait belt   nonskid shoes/slippers when out of bed   room organization consistent   safety round/check completed   toileting scheduled  Taken 10/9/2024 2200 by Madison Nunez RN  Safety Promotion/Fall Prevention:   activity supervised   assistive device/personal items within reach   clutter free environment maintained   fall prevention program maintained   gait belt   lighting adjusted   mobility aid in reach   nonskid shoes/slippers when out of bed   safety round/check completed   toileting scheduled  Taken 10/9/2024 2030 by Madison Nunez RN  Safety Promotion/Fall Prevention:   activity supervised   assistive device/personal items within reach   clutter free environment maintained   fall prevention program maintained   gait belt   lighting adjusted   mobility aid in reach   nonskid shoes/slippers when out of bed   safety round/check completed   toileting scheduled  Intervention: Prevent Skin Injury  Description: Perform a screening for skin injury risk, such as pressure or moisture associated skin damage on admission and at regular intervals throughout hospital stay.  Keep all areas of skin (especially folds) clean and dry.  Maintain adequate skin hydration.  Relieve and redistribute pressure and protect bony prominences; implement measures based on patient-specific risk factors.  Match turning and repositioning schedule to clinical condition.  Encourage weight shift frequently; assist with reposition if unable to complete independently.  Float heels off bed; avoid pressure on the Achilles tendon.  Keep skin free from extended contact with medical devices.  Encourage functional activity and  mobility, as early as tolerated.  Use aids (e.g., slide boards, mechanical lift) during transfer.  Recent Flowsheet Documentation  Taken 10/10/2024 0410 by Madison Nunez RN  Body Position: position changed independently  Skin Protection:   adhesive use limited   tubing/devices free from skin contact   transparent dressing maintained  Taken 10/10/2024 0200 by Madison Nunez RN  Body Position: position changed independently  Skin Protection:   adhesive use limited   tubing/devices free from skin contact   transparent dressing maintained  Taken 10/10/2024 0000 by Madison Nunez RN  Skin Protection:   adhesive use limited   skin-to-device areas padded   skin-to-skin areas padded   transparent dressing maintained   tubing/devices free from skin contact  Taken 10/9/2024 2330 by Madison Nunez RN  Skin Protection:   adhesive use limited   skin-to-device areas padded   skin-to-skin areas padded   transparent dressing maintained   tubing/devices free from skin contact  Taken 10/9/2024 2200 by Madison Nunez RN  Body Position:   weight shifting   legs elevated  Taken 10/9/2024 2030 by Madison Nunez RN  Body Position:   weight shifting   legs elevated  Intervention: Prevent and Manage VTE (Venous Thromboembolism) Risk  Description: Assess for VTE (venous thromboembolism) risk.  Encourage and assist with early ambulation.  Initiate and maintain compression or other therapy, as indicated, based on identified risk in accordance with organizational protocol and provider order.  Encourage both active and passive leg exercises while in bed, if unable to ambulate.  Recent Flowsheet Documentation  Taken 10/10/2024 0410 by Madison Nunez RN  Activity Management: activity encouraged  VTE Prevention/Management:   sequential compression devices on   bilateral  Taken 10/10/2024 0200 by Madison Nunez RN  Activity Management: activity encouraged  VTE Prevention/Management:   sequential compression  devices on   bilateral  Taken 10/10/2024 0000 by Madison Nunez RN  Activity Management: activity encouraged  VTE Prevention/Management:   bilateral   sequential compression devices on  Taken 10/9/2024 2330 by Madison Nunez RN  Activity Management: activity encouraged  VTE Prevention/Management:   bilateral   sequential compression devices on  Taken 10/9/2024 2200 by Madison Nunez RN  Activity Management: up in chair  VTE Prevention/Management:   bilateral   sequential compression devices on  Taken 10/9/2024 2030 by Madison Nunez RN  Activity Management: up in chair  VTE Prevention/Management:   sequential compression devices on   bilateral  Range of Motion: active ROM (range of motion) encouraged  Intervention: Prevent Infection  Description: Maintain skin and mucous membrane integrity; promote hand, oral and pulmonary hygiene.  Optimize fluid balance, nutrition, sleep and glycemic control to maximize infection resistance.  Identify potential sources of infection early to prevent or mitigate progression of infection (e.g., wound, lines, devices).  Evaluate ongoing need for invasive devices; remove promptly when no longer indicated.  Recent Flowsheet Documentation  Taken 10/10/2024 0410 by Madison Nunez RN  Infection Prevention:   environmental surveillance performed   single patient room provided  Taken 10/10/2024 0200 by Madison Nunez RN  Infection Prevention:   environmental surveillance performed   single patient room provided  Taken 10/10/2024 0000 by Madison Nunez RN  Infection Prevention:   single patient room provided   environmental surveillance performed  Taken 10/9/2024 2330 by Madison Nunez RN  Infection Prevention:   single patient room provided   environmental surveillance performed  Taken 10/9/2024 2200 by Madison Nunez RN  Infection Prevention:   environmental surveillance performed   single patient room provided  Taken 10/9/2024 2030 by  Madison Nunez RN  Infection Prevention:   environmental surveillance performed   single patient room provided  Goal: Optimal Comfort and Wellbeing  Outcome: Progressing  Intervention: Monitor Pain and Promote Comfort  Description: Assess pain level, treatment efficacy and patient response at regular intervals using a consistent pain scale.  Consider the presence and impact of preexisting chronic pain.  Encourage patient and caregiver involvement in pain assessment, interventions and safety measures.  Recent Flowsheet Documentation  Taken 10/9/2024 2030 by Madison Nunez RN  Pain Management Interventions: no interventions per patient request  Intervention: Provide Person-Centered Care  Description: Use a family-focused approach to care.  Develop trust and rapport by proactively providing information, encouraging questions, addressing concerns and offering reassurance.  Acknowledge emotional response to hospitalization.  Recognize and utilize personal coping strategies.  Honor spiritual and cultural preferences.  Recent Flowsheet Documentation  Taken 10/9/2024 2030 by Madison Nunez RN  Trust Relationship/Rapport:   care explained   questions encouraged   reassurance provided   thoughts/feelings acknowledged  Goal: Readiness for Transition of Care  Outcome: Progressing     Problem: Fall Injury Risk  Goal: Absence of Fall and Fall-Related Injury  Outcome: Progressing  Intervention: Identify and Manage Contributors  Description: Develop a fall prevention plan with the patient and caregiver/family.  Provide reorientation, appropriate sensory stimulation and routines with changes in mental status to decrease risk of fall.  Promote use of personal vision and auditory aids.  Assess assistance level required for safe and effective self-care; provide support as needed, such as toileting, mobilization. For age 65 and older, implement timed toileting with assistance.  Encourage physical activity, such as  performance of mobility and self-care at highest level of patient ability, multicomponent exercise program and provision of appropriate assistive devices.  If fall occurs, assess the severity of injury; implement fall injury protocol. Determine the cause and revise fall injury prevention plan.  Regularly review medication contribution to fall risk; adjust medication administration times to minimize risk of falling.  Consider risk related to polypharmacy and age.  Balance adequate pain management with potential for oversedation.  Recent Flowsheet Documentation  Taken 10/10/2024 0410 by Madison Nunez RN  Medication Review/Management: medications reviewed  Taken 10/10/2024 0200 by Madison Nunez RN  Medication Review/Management: medications reviewed  Taken 10/10/2024 0000 by Madison Nunez RN  Medication Review/Management: medications reviewed  Taken 10/9/2024 2330 by Madison Nunez RN  Medication Review/Management: medications reviewed  Taken 10/9/2024 2200 by Madison Nunez RN  Medication Review/Management: medications reviewed  Taken 10/9/2024 2030 by Madison Nunez RN  Medication Review/Management: medications reviewed  Intervention: Promote Injury-Free Environment  Description: Provide a safe, barrier-free environment that encourages independent activity.  Keep care area uncluttered and well-lighted.  Determine need for increased observation or monitoring.  Avoid use of devices that minimize mobility, such as restraints or indwelling urinary catheter.  Recent Flowsheet Documentation  Taken 10/10/2024 0410 by Madison Nunez RN  Safety Promotion/Fall Prevention:   activity supervised   mobility aid in reach   fall prevention program maintained   nonskid shoes/slippers when out of bed   room organization consistent   safety round/check completed   toileting scheduled  Taken 10/10/2024 0200 by Madison Nunez RN  Safety Promotion/Fall Prevention:   activity supervised   mobility  aid in reach   fall prevention program maintained   nonskid shoes/slippers when out of bed   room organization consistent   safety round/check completed   toileting scheduled  Taken 10/10/2024 0000 by Madison Nunez RN  Safety Promotion/Fall Prevention:   activity supervised   assistive device/personal items within reach   fall prevention program maintained   lighting adjusted   mobility aid in reach   gait belt   nonskid shoes/slippers when out of bed   room organization consistent   safety round/check completed   toileting scheduled  Taken 10/9/2024 2330 by Madison Nunez RN  Safety Promotion/Fall Prevention:   activity supervised   assistive device/personal items within reach   fall prevention program maintained   lighting adjusted   mobility aid in reach   gait belt   nonskid shoes/slippers when out of bed   room organization consistent   safety round/check completed   toileting scheduled  Taken 10/9/2024 2200 by Madison Nunez RN  Safety Promotion/Fall Prevention:   activity supervised   assistive device/personal items within reach   clutter free environment maintained   fall prevention program maintained   gait belt   lighting adjusted   mobility aid in reach   nonskid shoes/slippers when out of bed   safety round/check completed   toileting scheduled  Taken 10/9/2024 2030 by Madison Nunez RN  Safety Promotion/Fall Prevention:   activity supervised   assistive device/personal items within reach   clutter free environment maintained   fall prevention program maintained   gait belt   lighting adjusted   mobility aid in reach   nonskid shoes/slippers when out of bed   safety round/check completed   toileting scheduled     Problem: Noninvasive Ventilation Acute  Goal: Effective Unassisted Ventilation and Oxygenation  Outcome: Progressing     Problem: COPD (Chronic Obstructive Pulmonary Disease) Comorbidity  Goal: Maintenance of COPD Symptom Control  Outcome: Progressing  Intervention: Maintain  COPD-Symptom Control  Description: Evaluate adherence to management plan (e.g., medication, trigger avoidance, infection prevention, self-monitoring).  Advocate for continuation of home regimen, including medication, method of delivery, schedule and symptom monitoring.  Anticipate the need for breathing techniques and activity pacing to minimize fatigue and breathlessness.  Assess for proper use of inhaled medication and delivery technique; assist or reinstruct if needed.  Evaluate effectiveness of coping skills; encourage expression of feelings, expectations and concerns related to disease management and quality of life; reinforce education to enhance management plan and wellbeing.  Recent Flowsheet Documentation  Taken 10/10/2024 0410 by Madison Nunez RN  Medication Review/Management: medications reviewed  Taken 10/10/2024 0200 by Madison Nunez RN  Medication Review/Management: medications reviewed  Taken 10/10/2024 0000 by Madison Nunez RN  Medication Review/Management: medications reviewed  Taken 10/9/2024 2330 by Madison Nunez RN  Medication Review/Management: medications reviewed  Taken 10/9/2024 2200 by Madison Nunez RN  Medication Review/Management: medications reviewed  Taken 10/9/2024 2030 by Madison Nunez RN  Medication Review/Management: medications reviewed     Problem: Diabetes Comorbidity  Goal: Blood Glucose Level Within Targeted Range  Outcome: Progressing     Problem: Obstructive Sleep Apnea Risk or Actual Comorbidity Management  Goal: Unobstructed Breathing During Sleep  Outcome: Progressing   Goal Outcome Evaluation:         Patient is alert and oriented X4, pleasant, compliant with treatment regimen. No acute events overnight. Afib on telemetry overnight with intermittent bradycardia. Hopeful to discharge to [facility] later today. Did receive X1 breathing tx overnight per patient request. Madiosn Nunez RN

## 2024-10-10 NOTE — PROGRESS NOTES
"Enter Query Response Below      Query Response: Bacterial pneumonia unspecified and Covid-19 Pneumonia              If applicable, please update the problem list.     Patient: Jonatan Mcguire        : 1950  Account: [id number]           Admit Date:         How to Respond to this query:       a. Click New Note     b. Answer query within the yellow box.                c. Update the Problem List, if applicable.      If you have any questions about this query contact me at: [email or web address]     Dr. Phoenix,    Patient with history of COPD, CKD, and type 2 diabetes presented 10/5 for weakness and shortness of breath. Notes include COVID-19 pneumonia and left lower lobe pneumonia. DS notes \"treated with 5 days of remdesivir and Rocephin due to concern for superimposed bacterial pneumonia, as well as Decadron with improvement.\" Patient additionally treated with monitoring, supplemental oxygen, and IV and PO azithromycin.    Please clarify the type of pneumonia the patient was treated/monitored for:     - Gram-negative pneumonia (excluding Haemophilus influenzae)  - Bacterial pneumonia unspecified  - Other ___________________  - Unable to clinically determine    By submitting this query, we are merely seeking further clarification of documentation to accurately reflect all conditions that you are monitoring, evaluating, treating or that extend the hospitalization or utilize additional resources of care. Please utilize your independent clinical judgment when addressing the question(s) above.     This query and your response, once completed, will be entered into the legal medical record.    Sincerely,  Ashok Holland RN, CDS  Clinical Documentation Integrity Program     "

## 2024-10-10 NOTE — CASE MANAGEMENT/SOCIAL WORK
Case Management Discharge Note      Final Note: Pt has been approved to transfer to [facility] with transport at 1600 via [facility]. Report can be called to [phone]. Pt and family in agreement with plan. IMM given.         Selected Continued Care - Admitted Since 10/5/2024       Destination Coordination complete.      Service Provider Selected Services Address Phone Fax Patient Preferred    [facility] [address] [facility] [id number] [phone] --              Durable Medical Equipment       Service Provider Selected Services Address Phone Fax Patient Preferred    [facility]  O[facility] Equipment and Accessories [address], [place] [id number] [phone] --              Dialysis/Infusion    No services have been selected for the patient.                Home Medical Care    No services have been selected for the patient.                Therapy    No services have been selected for the patient.                Community Resources    No services have been selected for the patient.                Community & DME    No services have been selected for the patient.                         Final Discharge Disposition Code: 62 - inpatient rehab facility

## 2025-04-26 ENCOUNTER — HOSPITAL ENCOUNTER (INPATIENT)
Facility: HOSPITAL | Age: 75
LOS: 6 days | Discharge: HOME | End: 2025-05-02
Attending: EMERGENCY MEDICINE | Admitting: STUDENT IN AN ORGANIZED HEALTH CARE EDUCATION/TRAINING PROGRAM
Payer: MEDICARE

## 2025-04-26 ENCOUNTER — APPOINTMENT (OUTPATIENT)
Dept: CT IMAGING | Facility: HOSPITAL | Age: 75
End: 2025-04-26
Payer: MEDICARE

## 2025-04-26 ENCOUNTER — APPOINTMENT (OUTPATIENT)
Dept: INTERVENTIONAL RADIOLOGY/VASCULAR | Facility: HOSPITAL | Age: 75
End: 2025-04-26
Payer: MEDICARE

## 2025-04-26 DIAGNOSIS — K62.5 GASTROINTESTINAL HEMORRHAGE ASSOCIATED WITH ANORECTAL SOURCE: Primary | ICD-10-CM

## 2025-04-26 DIAGNOSIS — D62 ACUTE BLOOD LOSS ANEMIA: ICD-10-CM

## 2025-04-26 PROBLEM — K92.2 GIB (GASTROINTESTINAL BLEEDING): Status: ACTIVE | Noted: 2025-04-26

## 2025-04-26 PROBLEM — Z79.01 CHRONIC ANTICOAGULATION: Status: ACTIVE | Noted: 2025-04-26

## 2025-04-26 LAB
ABO GROUP BLD: NORMAL
ABO GROUP BLD: NORMAL
ADV 40+41 DNA STL QL NAA+NON-PROBE: NOT DETECTED
ALBUMIN SERPL-MCNC: 3.5 G/DL (ref 3.5–5.2)
ALBUMIN/GLOB SERPL: 1.4 G/DL
ALP SERPL-CCNC: 45 U/L (ref 39–117)
ALT SERPL W P-5'-P-CCNC: 23 U/L (ref 1–41)
ANION GAP SERPL CALCULATED.3IONS-SCNC: 9 MMOL/L (ref 5–15)
AST SERPL-CCNC: 24 U/L (ref 1–40)
ASTRO TYP 1-8 RNA STL QL NAA+NON-PROBE: NOT DETECTED
BASOPHILS # BLD AUTO: 0.01 10*3/MM3 (ref 0–0.2)
BASOPHILS NFR BLD AUTO: 0.1 % (ref 0–1.5)
BILIRUB SERPL-MCNC: 0.5 MG/DL (ref 0–1.2)
BLD GP AB SCN SERPL QL: NEGATIVE
BUN SERPL-MCNC: 22 MG/DL (ref 8–23)
BUN/CREAT SERPL: 23.9 (ref 7–25)
C CAYETANENSIS DNA STL QL NAA+NON-PROBE: NOT DETECTED
C COLI+JEJ+UPSA DNA STL QL NAA+NON-PROBE: NOT DETECTED
CALCIUM SPEC-SCNC: 8.9 MG/DL (ref 8.6–10.5)
CHLORIDE SERPL-SCNC: 103 MMOL/L (ref 98–107)
CO2 SERPL-SCNC: 29 MMOL/L (ref 22–29)
CREAT SERPL-MCNC: 0.92 MG/DL (ref 0.76–1.27)
CRYPTOSP DNA STL QL NAA+NON-PROBE: NOT DETECTED
D-LACTATE SERPL-SCNC: 1.7 MMOL/L (ref 0.5–2)
DEPRECATED RDW RBC AUTO: 76.5 FL (ref 37–54)
DEVELOPER EXPIRATION DATE: ABNORMAL
DEVELOPER LOT NUMBER: ABNORMAL
E HISTOLYT DNA STL QL NAA+NON-PROBE: NOT DETECTED
EAEC PAA PLAS AGGR+AATA ST NAA+NON-PRB: NOT DETECTED
EC STX1+STX2 GENES STL QL NAA+NON-PROBE: NOT DETECTED
EGFRCR SERPLBLD CKD-EPI 2021: 86.2 ML/MIN/1.73
EOSINOPHIL # BLD AUTO: 0.01 10*3/MM3 (ref 0–0.4)
EOSINOPHIL NFR BLD AUTO: 0.1 % (ref 0.3–6.2)
EPEC EAE GENE STL QL NAA+NON-PROBE: NOT DETECTED
ERYTHROCYTE [DISTWIDTH] IN BLOOD BY AUTOMATED COUNT: 22.2 % (ref 12.3–15.4)
ETEC LTA+ST1A+ST1B TOX ST NAA+NON-PROBE: NOT DETECTED
EXPIRATION DATE: ABNORMAL
FECAL OCCULT BLOOD SCREEN, POC: POSITIVE
FIBRINOGEN PPP-MCNC: 258 MG/DL (ref 203–567)
G LAMBLIA DNA STL QL NAA+NON-PROBE: NOT DETECTED
GLOBULIN UR ELPH-MCNC: 2.5 GM/DL
GLUCOSE BLDC GLUCOMTR-MCNC: 109 MG/DL (ref 70–130)
GLUCOSE SERPL-MCNC: 166 MG/DL (ref 65–99)
HBA1C MFR BLD: 6.4 % (ref 4.8–5.6)
HCT VFR BLD AUTO: 29.5 % (ref 37.5–51)
HCT VFR BLD AUTO: 35 % (ref 37.5–51)
HGB BLD-MCNC: 10.5 G/DL (ref 13–17.7)
HGB BLD-MCNC: 8.7 G/DL (ref 13–17.7)
HOLD SPECIMEN: NORMAL
IMM GRANULOCYTES # BLD AUTO: 0.03 10*3/MM3 (ref 0–0.05)
IMM GRANULOCYTES NFR BLD AUTO: 0.3 % (ref 0–0.5)
INR PPP: 1.5 (ref 0.89–1.12)
LYMPHOCYTES # BLD AUTO: 2.81 10*3/MM3 (ref 0.7–3.1)
LYMPHOCYTES NFR BLD AUTO: 27.3 % (ref 19.6–45.3)
Lab: ABNORMAL
MCH RBC QN AUTO: 28.2 PG (ref 26.6–33)
MCHC RBC AUTO-ENTMCNC: 30 G/DL (ref 31.5–35.7)
MCV RBC AUTO: 94.1 FL (ref 79–97)
MONOCYTES # BLD AUTO: 0.81 10*3/MM3 (ref 0.1–0.9)
MONOCYTES NFR BLD AUTO: 7.9 % (ref 5–12)
NEGATIVE CONTROL: NEGATIVE
NEUTROPHILS NFR BLD AUTO: 6.63 10*3/MM3 (ref 1.7–7)
NEUTROPHILS NFR BLD AUTO: 64.3 % (ref 42.7–76)
NOROVIRUS GI+II RNA STL QL NAA+NON-PROBE: NOT DETECTED
NRBC BLD AUTO-RTO: 0 /100 WBC (ref 0–0.2)
NT-PROBNP SERPL-MCNC: 558.5 PG/ML (ref 0–1800)
P SHIGELLOIDES DNA STL QL NAA+NON-PROBE: NOT DETECTED
PLATELET # BLD AUTO: 173 10*3/MM3 (ref 140–450)
PMV BLD AUTO: 9.3 FL (ref 6–12)
POSITIVE CONTROL: POSITIVE
POTASSIUM SERPL-SCNC: 4.8 MMOL/L (ref 3.5–5.2)
PROT SERPL-MCNC: 6 G/DL (ref 6–8.5)
PROTHROMBIN TIME: 19.1 SECONDS (ref 12.2–15.3)
RBC # BLD AUTO: 3.72 10*6/MM3 (ref 4.14–5.8)
RH BLD: NEGATIVE
RH BLD: NEGATIVE
RVA RNA STL QL NAA+NON-PROBE: NOT DETECTED
S ENT+BONG DNA STL QL NAA+NON-PROBE: NOT DETECTED
SAPO I+II+IV+V RNA STL QL NAA+NON-PROBE: NOT DETECTED
SHIGELLA SP+EIEC IPAH ST NAA+NON-PROBE: NOT DETECTED
SODIUM SERPL-SCNC: 141 MMOL/L (ref 136–145)
T&S EXPIRATION DATE: NORMAL
TSH SERPL DL<=0.05 MIU/L-ACNC: 1.11 UIU/ML (ref 0.27–4.2)
V CHOL+PARA+VUL DNA STL QL NAA+NON-PROBE: NOT DETECTED
V CHOLERAE DNA STL QL NAA+NON-PROBE: NOT DETECTED
WBC NRBC COR # BLD AUTO: 10.3 10*3/MM3 (ref 3.4–10.8)
WHOLE BLOOD HOLD COAG: NORMAL
WHOLE BLOOD HOLD SPECIMEN: NORMAL
Y ENTEROCOL DNA STL QL NAA+NON-PROBE: NOT DETECTED

## 2025-04-26 PROCEDURE — 25510000001 IOPAMIDOL PER 1 ML: Performed by: EMERGENCY MEDICINE

## 2025-04-26 PROCEDURE — 85014 HEMATOCRIT: CPT | Performed by: EMERGENCY MEDICINE

## 2025-04-26 PROCEDURE — 36248 INS CATH ABD/L-EXT ART ADDL: CPT | Performed by: STUDENT IN AN ORGANIZED HEALTH CARE EDUCATION/TRAINING PROGRAM

## 2025-04-26 PROCEDURE — 75774 ARTERY X-RAY EACH VESSEL: CPT | Performed by: STUDENT IN AN ORGANIZED HEALTH CARE EDUCATION/TRAINING PROGRAM

## 2025-04-26 PROCEDURE — 83036 HEMOGLOBIN GLYCOSYLATED A1C: CPT | Performed by: NURSE PRACTITIONER

## 2025-04-26 PROCEDURE — 25810000003 LACTATED RINGERS PER 1000 ML: Performed by: NURSE PRACTITIONER

## 2025-04-26 PROCEDURE — 86923 COMPATIBILITY TEST ELECTRIC: CPT

## 2025-04-26 PROCEDURE — 74174 CTA ABD&PLVS W/CONTRAST: CPT

## 2025-04-26 PROCEDURE — 86900 BLOOD TYPING SEROLOGIC ABO: CPT

## 2025-04-26 PROCEDURE — 87507 IADNA-DNA/RNA PROBE TQ 12-25: CPT | Performed by: EMERGENCY MEDICINE

## 2025-04-26 PROCEDURE — 94664 DEMO&/EVAL PT USE INHALER: CPT

## 2025-04-26 PROCEDURE — 36246 INS CATH ABD/L-EXT ART 2ND: CPT | Performed by: STUDENT IN AN ORGANIZED HEALTH CARE EDUCATION/TRAINING PROGRAM

## 2025-04-26 PROCEDURE — 80053 COMPREHEN METABOLIC PANEL: CPT | Performed by: EMERGENCY MEDICINE

## 2025-04-26 PROCEDURE — C1769 GUIDE WIRE: HCPCS

## 2025-04-26 PROCEDURE — 99291 CRITICAL CARE FIRST HOUR: CPT

## 2025-04-26 PROCEDURE — 75726 ARTERY X-RAYS ABDOMEN: CPT | Performed by: STUDENT IN AN ORGANIZED HEALTH CARE EDUCATION/TRAINING PROGRAM

## 2025-04-26 PROCEDURE — 99153 MOD SED SAME PHYS/QHP EA: CPT

## 2025-04-26 PROCEDURE — 85025 COMPLETE CBC W/AUTO DIFF WBC: CPT | Performed by: EMERGENCY MEDICINE

## 2025-04-26 PROCEDURE — 86901 BLOOD TYPING SEROLOGIC RH(D): CPT

## 2025-04-26 PROCEDURE — 85018 HEMOGLOBIN: CPT | Performed by: EMERGENCY MEDICINE

## 2025-04-26 PROCEDURE — 94640 AIRWAY INHALATION TREATMENT: CPT

## 2025-04-26 PROCEDURE — 99152 MOD SED SAME PHYS/QHP 5/>YRS: CPT

## 2025-04-26 PROCEDURE — 85384 FIBRINOGEN ACTIVITY: CPT | Performed by: NURSE PRACTITIONER

## 2025-04-26 PROCEDURE — 83605 ASSAY OF LACTIC ACID: CPT | Performed by: EMERGENCY MEDICINE

## 2025-04-26 PROCEDURE — 86900 BLOOD TYPING SEROLOGIC ABO: CPT | Performed by: EMERGENCY MEDICINE

## 2025-04-26 PROCEDURE — 86850 RBC ANTIBODY SCREEN: CPT | Performed by: EMERGENCY MEDICINE

## 2025-04-26 PROCEDURE — 82270 OCCULT BLOOD FECES: CPT | Performed by: EMERGENCY MEDICINE

## 2025-04-26 PROCEDURE — C1894 INTRO/SHEATH, NON-LASER: HCPCS

## 2025-04-26 PROCEDURE — P9016 RBC LEUKOCYTES REDUCED: HCPCS

## 2025-04-26 PROCEDURE — 76937 US GUIDE VASCULAR ACCESS: CPT

## 2025-04-26 PROCEDURE — 85610 PROTHROMBIN TIME: CPT | Performed by: NURSE PRACTITIONER

## 2025-04-26 PROCEDURE — 75726 ARTERY X-RAYS ABDOMEN: CPT

## 2025-04-26 PROCEDURE — 36245 INS CATH ABD/L-EXT ART 1ST: CPT | Performed by: STUDENT IN AN ORGANIZED HEALTH CARE EDUCATION/TRAINING PROGRAM

## 2025-04-26 PROCEDURE — 83880 ASSAY OF NATRIURETIC PEPTIDE: CPT | Performed by: EMERGENCY MEDICINE

## 2025-04-26 PROCEDURE — 36415 COLL VENOUS BLD VENIPUNCTURE: CPT

## 2025-04-26 PROCEDURE — C1887 CATHETER, GUIDING: HCPCS

## 2025-04-26 PROCEDURE — 84443 ASSAY THYROID STIM HORMONE: CPT | Performed by: NURSE PRACTITIONER

## 2025-04-26 PROCEDURE — B4141ZZ FLUOROSCOPY OF SUPERIOR MESENTERIC ARTERY USING LOW OSMOLAR CONTRAST: ICD-10-PCS | Performed by: STUDENT IN AN ORGANIZED HEALTH CARE EDUCATION/TRAINING PROGRAM

## 2025-04-26 PROCEDURE — 99152 MOD SED SAME PHYS/QHP 5/>YRS: CPT | Performed by: STUDENT IN AN ORGANIZED HEALTH CARE EDUCATION/TRAINING PROGRAM

## 2025-04-26 PROCEDURE — 25010000002 FENTANYL CITRATE (PF) 50 MCG/ML SOLUTION: Performed by: STUDENT IN AN ORGANIZED HEALTH CARE EDUCATION/TRAINING PROGRAM

## 2025-04-26 PROCEDURE — 99291 CRITICAL CARE FIRST HOUR: CPT | Performed by: STUDENT IN AN ORGANIZED HEALTH CARE EDUCATION/TRAINING PROGRAM

## 2025-04-26 PROCEDURE — 36430 TRANSFUSION BLD/BLD COMPNT: CPT

## 2025-04-26 PROCEDURE — 82948 REAGENT STRIP/BLOOD GLUCOSE: CPT

## 2025-04-26 PROCEDURE — C1760 CLOSURE DEV, VASC: HCPCS

## 2025-04-26 PROCEDURE — 75774 ARTERY X-RAY EACH VESSEL: CPT

## 2025-04-26 PROCEDURE — 86901 BLOOD TYPING SEROLOGIC RH(D): CPT | Performed by: EMERGENCY MEDICINE

## 2025-04-26 PROCEDURE — 25810000003 SODIUM CHLORIDE 0.9 % SOLUTION: Performed by: EMERGENCY MEDICINE

## 2025-04-26 PROCEDURE — 25010000002 PROTHROMBIN COMPLEX CONC HUMAN 1000 UNITS KIT: Performed by: EMERGENCY MEDICINE

## 2025-04-26 PROCEDURE — 25010000002 MIDAZOLAM PER 1 MG: Performed by: STUDENT IN AN ORGANIZED HEALTH CARE EDUCATION/TRAINING PROGRAM

## 2025-04-26 RX ORDER — IOPAMIDOL 612 MG/ML
INJECTION, SOLUTION INTRAVASCULAR
Status: COMPLETED
Start: 2025-04-26 | End: 2025-04-27

## 2025-04-26 RX ORDER — MIDAZOLAM HYDROCHLORIDE 1 MG/ML
INJECTION, SOLUTION INTRAMUSCULAR; INTRAVENOUS AS NEEDED
Status: COMPLETED | OUTPATIENT
Start: 2025-04-26 | End: 2025-04-26

## 2025-04-26 RX ORDER — FENTANYL CITRATE 50 UG/ML
INJECTION, SOLUTION INTRAMUSCULAR; INTRAVENOUS AS NEEDED
Status: COMPLETED | OUTPATIENT
Start: 2025-04-26 | End: 2025-04-26

## 2025-04-26 RX ORDER — IOPAMIDOL 755 MG/ML
100 INJECTION, SOLUTION INTRAVASCULAR
Status: COMPLETED | OUTPATIENT
Start: 2025-04-26 | End: 2025-04-26

## 2025-04-26 RX ORDER — NICOTINE POLACRILEX 4 MG
15 LOZENGE BUCCAL
Status: DISCONTINUED | OUTPATIENT
Start: 2025-04-26 | End: 2025-05-02 | Stop reason: HOSPADM

## 2025-04-26 RX ORDER — FENTANYL CITRATE 50 UG/ML
INJECTION, SOLUTION INTRAMUSCULAR; INTRAVENOUS
Status: DISPENSED
Start: 2025-04-26 | End: 2025-04-27

## 2025-04-26 RX ORDER — PANTOPRAZOLE SODIUM 40 MG/10ML
40 INJECTION, POWDER, LYOPHILIZED, FOR SOLUTION INTRAVENOUS
Status: DISCONTINUED | OUTPATIENT
Start: 2025-04-27 | End: 2025-04-30

## 2025-04-26 RX ORDER — MONTELUKAST SODIUM 10 MG/1
10 TABLET ORAL NIGHTLY
Status: DISCONTINUED | OUTPATIENT
Start: 2025-04-26 | End: 2025-05-02 | Stop reason: HOSPADM

## 2025-04-26 RX ORDER — DEXTROSE MONOHYDRATE 25 G/50ML
25 INJECTION, SOLUTION INTRAVENOUS
Status: DISCONTINUED | OUTPATIENT
Start: 2025-04-26 | End: 2025-05-02 | Stop reason: HOSPADM

## 2025-04-26 RX ORDER — NITROGLYCERIN 0.4 MG/1
0.4 TABLET SUBLINGUAL
Status: DISCONTINUED | OUTPATIENT
Start: 2025-04-26 | End: 2025-05-02 | Stop reason: HOSPADM

## 2025-04-26 RX ORDER — BISACODYL 10 MG
10 SUPPOSITORY, RECTAL RECTAL DAILY PRN
Status: DISCONTINUED | OUTPATIENT
Start: 2025-04-26 | End: 2025-05-02 | Stop reason: HOSPADM

## 2025-04-26 RX ORDER — POLYETHYLENE GLYCOL 3350 17 G/17G
17 POWDER, FOR SOLUTION ORAL DAILY PRN
Status: DISCONTINUED | OUTPATIENT
Start: 2025-04-26 | End: 2025-05-02 | Stop reason: HOSPADM

## 2025-04-26 RX ORDER — SODIUM CHLORIDE 0.9 % (FLUSH) 0.9 %
10 SYRINGE (ML) INJECTION AS NEEDED
Status: DISCONTINUED | OUTPATIENT
Start: 2025-04-26 | End: 2025-05-02 | Stop reason: HOSPADM

## 2025-04-26 RX ORDER — ATORVASTATIN CALCIUM 10 MG/1
10 TABLET, FILM COATED ORAL DAILY
COMMUNITY

## 2025-04-26 RX ORDER — AMOXICILLIN 250 MG
2 CAPSULE ORAL 2 TIMES DAILY
Status: DISCONTINUED | OUTPATIENT
Start: 2025-04-26 | End: 2025-05-02 | Stop reason: HOSPADM

## 2025-04-26 RX ORDER — MIDAZOLAM HYDROCHLORIDE 1 MG/ML
INJECTION, SOLUTION INTRAMUSCULAR; INTRAVENOUS
Status: DISPENSED
Start: 2025-04-26 | End: 2025-04-27

## 2025-04-26 RX ORDER — IPRATROPIUM BROMIDE AND ALBUTEROL SULFATE 2.5; .5 MG/3ML; MG/3ML
3 SOLUTION RESPIRATORY (INHALATION) EVERY 4 HOURS PRN
Status: DISCONTINUED | OUTPATIENT
Start: 2025-04-26 | End: 2025-04-28

## 2025-04-26 RX ORDER — BISACODYL 5 MG/1
5 TABLET, DELAYED RELEASE ORAL DAILY PRN
Status: DISCONTINUED | OUTPATIENT
Start: 2025-04-26 | End: 2025-05-02 | Stop reason: HOSPADM

## 2025-04-26 RX ORDER — ONDANSETRON 2 MG/ML
4 INJECTION INTRAMUSCULAR; INTRAVENOUS EVERY 6 HOURS PRN
Status: DISCONTINUED | OUTPATIENT
Start: 2025-04-26 | End: 2025-05-02 | Stop reason: HOSPADM

## 2025-04-26 RX ORDER — SODIUM CHLORIDE, SODIUM LACTATE, POTASSIUM CHLORIDE, CALCIUM CHLORIDE 600; 310; 30; 20 MG/100ML; MG/100ML; MG/100ML; MG/100ML
150 INJECTION, SOLUTION INTRAVENOUS CONTINUOUS
Status: DISCONTINUED | OUTPATIENT
Start: 2025-04-26 | End: 2025-04-26

## 2025-04-26 RX ORDER — SODIUM CHLORIDE 9 MG/ML
40 INJECTION, SOLUTION INTRAVENOUS AS NEEDED
Status: DISCONTINUED | OUTPATIENT
Start: 2025-04-26 | End: 2025-05-02 | Stop reason: HOSPADM

## 2025-04-26 RX ORDER — ONDANSETRON 4 MG/1
4 TABLET, ORALLY DISINTEGRATING ORAL EVERY 6 HOURS PRN
Status: DISCONTINUED | OUTPATIENT
Start: 2025-04-26 | End: 2025-05-02 | Stop reason: HOSPADM

## 2025-04-26 RX ORDER — SODIUM CHLORIDE, SODIUM LACTATE, POTASSIUM CHLORIDE, CALCIUM CHLORIDE 600; 310; 30; 20 MG/100ML; MG/100ML; MG/100ML; MG/100ML
100 INJECTION, SOLUTION INTRAVENOUS CONTINUOUS
Status: ACTIVE | OUTPATIENT
Start: 2025-04-26 | End: 2025-04-27

## 2025-04-26 RX ORDER — FINASTERIDE 5 MG/1
5 TABLET, FILM COATED ORAL DAILY
COMMUNITY

## 2025-04-26 RX ORDER — BUDESONIDE AND FORMOTEROL FUMARATE DIHYDRATE 160; 4.5 UG/1; UG/1
2 AEROSOL RESPIRATORY (INHALATION)
Status: DISCONTINUED | OUTPATIENT
Start: 2025-04-26 | End: 2025-05-02 | Stop reason: HOSPADM

## 2025-04-26 RX ORDER — PANTOPRAZOLE SODIUM 40 MG/10ML
40 INJECTION, POWDER, LYOPHILIZED, FOR SOLUTION INTRAVENOUS ONCE
Status: COMPLETED | OUTPATIENT
Start: 2025-04-26 | End: 2025-04-26

## 2025-04-26 RX ORDER — SODIUM CHLORIDE 0.9 % (FLUSH) 0.9 %
10 SYRINGE (ML) INJECTION EVERY 12 HOURS SCHEDULED
Status: DISCONTINUED | OUTPATIENT
Start: 2025-04-26 | End: 2025-05-02 | Stop reason: HOSPADM

## 2025-04-26 RX ORDER — TAMSULOSIN HYDROCHLORIDE 0.4 MG/1
1 CAPSULE ORAL DAILY
COMMUNITY

## 2025-04-26 RX ORDER — IBUPROFEN 600 MG/1
1 TABLET ORAL
Status: DISCONTINUED | OUTPATIENT
Start: 2025-04-26 | End: 2025-05-02 | Stop reason: HOSPADM

## 2025-04-26 RX ADMIN — SODIUM CHLORIDE 500 ML: 9 INJECTION, SOLUTION INTRAVENOUS at 18:01

## 2025-04-26 RX ADMIN — PROTHROMBIN, COAGULATION FACTOR VII HUMAN, COAGULATION FACTOR IX HUMAN, COAGULATION FACTOR X HUMAN, PROTEIN C, PROTEIN S HUMAN, AND WATER 2117 UNITS: KIT at 19:14

## 2025-04-26 RX ADMIN — FENTANYL CITRATE 50 MCG: 50 INJECTION, SOLUTION INTRAMUSCULAR; INTRAVENOUS at 22:21

## 2025-04-26 RX ADMIN — SODIUM CHLORIDE, SODIUM LACTATE, POTASSIUM CHLORIDE, CALCIUM CHLORIDE 100 ML/HR: 20; 30; 600; 310 INJECTION, SOLUTION INTRAVENOUS at 20:38

## 2025-04-26 RX ADMIN — IOPAMIDOL 100 ML: 755 INJECTION, SOLUTION INTRAVENOUS at 18:48

## 2025-04-26 RX ADMIN — PANTOPRAZOLE SODIUM 40 MG: 40 INJECTION, POWDER, FOR SOLUTION INTRAVENOUS at 18:02

## 2025-04-26 RX ADMIN — MIDAZOLAM HYDROCHLORIDE 1 MG: 1 INJECTION, SOLUTION INTRAMUSCULAR; INTRAVENOUS at 22:21

## 2025-04-26 RX ADMIN — SODIUM CHLORIDE 500 ML: 9 INJECTION, SOLUTION INTRAVENOUS at 18:02

## 2025-04-26 RX ADMIN — BUDESONIDE AND FORMOTEROL FUMARATE DIHYDRATE 2 PUFF: 160; 4.5 AEROSOL RESPIRATORY (INHALATION) at 21:43

## 2025-04-26 NOTE — ED PROVIDER NOTES
EMERGENCY DEPARTMENT ENCOUNTER    Pt Name: Jonatan Mcguire  MRN: [id number]  Pt :   1950  Room Number:    Date of encounter:  2025  PCP: Provider, No Known  ED Provider: Norris Guaman MD    Historian: Patient and paramedic      HPI:  Chief Complaint: Rectal bleeding        Context: Jonatan Mcguire is a 75 y.o. male who presents to the ED c/o bright red blood per rectum roughly 6 or 7 times prior to arrival in the emergency department today.  He has no prior history of rectal bleeding.  He has been on Xarelto secondary to atrial fibrillation times multiple years.  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  The patient denies abdominal pain.  He denies lightheadedness, weakness.      PAST MEDICAL HISTORY  Past Medical History:   Diagnosis Date    Afib     COPD (chronic obstructive pulmonary disease)     Diabetes mellitus, type II     Hypertension     Obesity     Sleep apnea     Vitamin D deficiency          PAST SURGICAL HISTORY  Past Surgical History:   Procedure Laterality Date    COLON RESECTION      NASAL POLYP EXCISION           FAMILY HISTORY  Family History   Problem Relation Age of Onset    No Known Problems Mother     No Known Problems Father          SOCIAL HISTORY  Social History     Socioeconomic History    Marital status:    Tobacco Use    Smoking status: Never   Vaping Use    Vaping status: Never Used   Substance and Sexual Activity    Alcohol use: Never    Drug use: Never         ALLERGIES  Tetanus toxoids        REVIEW OF SYSTEMS  Review of Systems       All systems reviewed and negative except for those discussed in HPI.       PHYSICAL EXAM    I have reviewed the triage vital signs and nursing notes.    ED Triage Vitals [25 1630]   Temp Heart Rate Resp BP SpO2   98.3 °F (36.8 °C) 76 20 152/63 94 %      Temp src Heart Rate Source Patient Position BP Location FiO2 (%)   Oral Monitor Lying Right arm --       Physical Exam  GENERAL:    Appears a little weak but nontoxic.  He is quite pleasant.  I greet him as he enters the hospital and we placed him in room 9 with me at his side.  HENT: Nares patent.  EYES: No scleral icterus.  CV: Regular rhythm, regular rate.  No murmurs gallops rubs  RESPIRATORY: Normal effort.  No audible wheezes, rales or rhonchi.  Clear to auscultation  ABDOMEN: Soft, nontender to deep palpation throughout  MUSCULOSKELETAL: No deformities.   NEURO: Alert, moves all extremities, follows commands.  SKIN: Warm, dry, no rash visualized.  Anorectal: Strongly guaiac positive dark red blood per rectum    LAB RESULTS  Recent Results (from the past 24 hours)   Comprehensive Metabolic Panel    Collection Time: 04/26/25  4:32 PM    Specimen: Blood   Result Value Ref Range    Glucose 166 (H) 65 - 99 mg/dL    BUN 22 8 - 23 mg/dL    Creatinine 0.92 0.76 - 1.27 mg/dL    Sodium 141 136 - 145 mmol/L    Potassium 4.8 3.5 - 5.2 mmol/L    Chloride 103 98 - 107 mmol/L    CO2 29.0 22.0 - 29.0 mmol/L    Calcium 8.9 8.6 - 10.5 mg/dL    Total Protein 6.0 6.0 - 8.5 g/dL    Albumin 3.5 3.5 - 5.2 g/dL    ALT (SGPT) 23 1 - 41 U/L    AST (SGOT) 24 1 - 40 U/L    Alkaline Phosphatase 45 39 - 117 U/L    Total Bilirubin 0.5 0.0 - 1.2 mg/dL    Globulin 2.5 gm/dL    A/G Ratio 1.4 g/dL    BUN/Creatinine Ratio 23.9 7.0 - 25.0    Anion Gap 9.0 5.0 - 15.0 mmol/L    eGFR 86.2 >60.0 mL/min/1.73   Lactic Acid, Plasma    Collection Time: 04/26/25  4:32 PM    Specimen: Blood   Result Value Ref Range    Lactate 1.7 0.5 - 2.0 mmol/L   Green Top (Gel)    Collection Time: 04/26/25  4:32 PM   Result Value Ref Range    Extra Tube Hold for add-ons.    Lavender Top    Collection Time: 04/26/25  4:32 PM   Result Value Ref Range    Extra Tube hold for add-on    Gold Top - SST    Collection Time: 04/26/25  4:32 PM   Result Value Ref Range    Extra Tube Hold for add-ons.    Gray Top    Collection Time: 04/26/25  4:32 PM   Result Value Ref Range    Extra Tube Hold for add-ons.     Light Blue Top    Collection Time: 04/26/25  4:32 PM   Result Value Ref Range    Extra Tube Hold for add-ons.    CBC Auto Differential    Collection Time: 04/26/25  4:32 PM    Specimen: Blood   Result Value Ref Range    WBC 10.30 3.40 - 10.80 10*3/mm3    RBC 3.72 (L) 4.14 - 5.80 10*6/mm3    Hemoglobin 10.5 (L) 13.0 - 17.7 g/dL    Hematocrit 35.0 (L) 37.5 - 51.0 %    MCV 94.1 79.0 - 97.0 fL    MCH 28.2 26.6 - 33.0 pg    MCHC 30.0 (L) 31.5 - 35.7 g/dL    RDW 22.2 (H) 12.3 - 15.4 %    RDW-SD 76.5 (H) 37.0 - 54.0 fl    MPV 9.3 6.0 - 12.0 fL    Platelets 173 140 - 450 10*3/mm3    Neutrophil % 64.3 42.7 - 76.0 %    Lymphocyte % 27.3 19.6 - 45.3 %    Monocyte % 7.9 5.0 - 12.0 %    Eosinophil % 0.1 (L) 0.3 - 6.2 %    Basophil % 0.1 0.0 - 1.5 %    Immature Grans % 0.3 0.0 - 0.5 %    Neutrophils, Absolute 6.63 1.70 - 7.00 10*3/mm3    Lymphocytes, Absolute 2.81 0.70 - 3.10 10*3/mm3    Monocytes, Absolute 0.81 0.10 - 0.90 10*3/mm3    Eosinophils, Absolute 0.01 0.00 - 0.40 10*3/mm3    Basophils, Absolute 0.01 0.00 - 0.20 10*3/mm3    Immature Grans, Absolute 0.03 0.00 - 0.05 10*3/mm3    nRBC 0.0 0.0 - 0.2 /100 WBC   BNP    Collection Time: 04/26/25  4:32 PM    Specimen: Blood   Result Value Ref Range    proBNP 558.5 0.0 - 1,800.0 pg/mL   Type & Screen    Collection Time: 04/26/25  4:34 PM    Specimen: Arm, Left; Blood   Result Value Ref Range    ABO Type O     RH type Negative     Antibody Screen Negative     T&S Expiration Date 4/29/2025 11:59:59 PM    Gastrointestinal Panel, PCR - Stool, Per Rectum    Collection Time: 04/26/25  6:03 PM    Specimen: Per Rectum; Stool   Result Value Ref Range    Campylobacter Not Detected Not Detected    Plesiomonas shigelloides Not Detected Not Detected    Salmonella Not Detected Not Detected    Vibrio Not Detected Not Detected    Vibrio cholerae Not Detected Not Detected    Yersinia enterocolitica Not Detected Not Detected    Enteroaggregative E. coli (EAEC) Not Detected Not Detected     Enteropathogenic E. coli (EPEC) Not Detected Not Detected    Enterotoxigenic E. coli (ETEC) lt/st Not Detected Not Detected    Shiga-like toxin-producing E. coli (STEC) stx1/stx2 Not Detected Not Detected    Shigella/Enteroinvasive E. coli (EIEC) Not Detected Not Detected    Cryptosporidium Not Detected Not Detected    Cyclospora cayetanensis Not Detected Not Detected    Entamoeba histolytica Not Detected Not Detected    Giardia lamblia Not Detected Not Detected    Adenovirus F40/41 Not Detected Not Detected    Astrovirus Not Detected Not Detected    Norovirus GI/GII Not Detected Not Detected    Rotavirus A Not Detected Not Detected    Sapovirus (I, II, IV or V) Not Detected Not Detected   POC Occult Blood Stool    Collection Time: 04/26/25  6:23 PM    Specimen: Stool   Result Value Ref Range    Fecal Occult Blood Positive (A)     Lot Number [id number]     Expiration Date 42,027     DEVELOPER LOT NUMBER [id number]     DEVELOPER EXPIRATION DATE 82,027     Positive Control Positive     Negative Control Negative    Hemoglobin & Hematocrit, Blood    Collection Time: 04/26/25  7:05 PM    Specimen: Arm, Right; Blood   Result Value Ref Range    Hemoglobin 8.7 (L) 13.0 - 17.7 g/dL    Hematocrit 29.5 (L) 37.5 - 51.0 %       If labs were ordered, I independently reviewed the results and considered them in treating the patient.        RADIOLOGY  CT Angiogram Abdomen Pelvis  Result Date: 4/26/2025  CT ANGIOGRAM ABDOMEN PELVIS Date of Exam: 4/26/2025 6:34 PM EDT Indication: Brisk bright red blood per rectum GI bleed protocol. Comparison: None available. Technique: CTA of the abdomen and pelvis was performed after the uneventful intravenous administration of 100 mL Isovue-370. Reconstructed coronal and sagittal images were also obtained. In addition, a 3-D volume rendered image was created for interpretation. Automated exposure control and iterative reconstruction methods were used. Findings: Partial atelectasis in the included right  lower lobe. Cardiomegaly. Right gynecomastia included in the lower chest. Cholelithiasis without pericholecystic inflammation. Punctate nonobstructing left nephrolithiasis. 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 Bilateral renal cysts. Adrenal glands, spleen, liver appear unremarkable. No abnormal bowel distention. Extravasation of contrast is seen into the colon at the junction of the descending colon and sigmoid colon (image 106, series 5, image 103, series 7). There is sigmoid colon diverticulosis. No abdominal aortic aneurysm or dissection. Duplicated renal arteries. Urinary bladder is not well distended. No significant free fluid or lymphadenopathy. Multilevel degenerative changes in the lumbar spine. No acute osseous abnormality is identified.     Impression: 1.Extravasation of contrast into the colon at the junction of the descending colon and sigmoid colon, consistent with history of GI bleed. 2.Multiple pancreatic cystic lesions, measuring up to 3.7 cm, which can be further evaluated with outpatient MRI pancreas protocol. 3.Cholelithiasis. Electronically Signed: Mirna Roberts MD  4/26/2025 7:06 PM EDT  Workstation ID: [id number]      I ordered and independently reviewed the above noted radiographic studies.      I viewed images of CT angio abdomen pelvis which showed no signs of bowel obstruction per my independent interpretation.    See radiologist's dictation for official interpretation.        PROCEDURES    Critical Care    Performed by: Norris Guaman MD  Authorized by: Norris Guaman MD    Critical care provider statement:     Critical care time (minutes):  45    Critical care time was exclusive of:  Separately billable procedures and treating other patients    Critical care was necessary to treat or prevent imminent or life-threatening deterioration of the following conditions:  Circulatory failure    Critical care was time spent personally by me on the following activities:   Ordering and performing treatments and interventions, ordering and review of laboratory studies, ordering and review of radiographic studies, pulse oximetry, re-evaluation of patient's condition, review of old charts, obtaining history from patient or surrogate, examination of patient, evaluation of patient's response to treatment, discussions with consultants and development of treatment plan with patient or surrogate      No orders to display       MEDICATIONS GIVEN IN ER    Medications   sodium chloride 0.9 % flush 10 mL (has no administration in time range)   pantoprazole (PROTONIX) injection 40 mg (has no administration in time range)   sodium chloride 0.9 % bolus 500 mL (0 mL Intravenous Stopped 4/26/25 1914)   pantoprazole (PROTONIX) injection 40 mg (40 mg Intravenous Given 4/26/25 1802)   sodium chloride 0.9 % bolus 500 mL (0 mL Intravenous Stopped 4/26/25 1914)   iopamidol (ISOVUE-370) 76 % injection 100 mL (100 mL Intravenous Given 4/26/25 1848)   prothrombin complex conc human (KCentra) IV solution 2,117 Units (2,117 Units Intravenous Given 4/26/25 1914)         MEDICAL DECISION MAKING, PROGRESS, and CONSULTS    All labs, if obtained, have been independently reviewed by me.  All radiology studies, if obtained, have been reviewed by me and the radiologist dictating the report.  All EKGs, if obtained, have been independently viewed and interpreted by me/my attending physician.      Discussion below represents my analysis of pertinent findings related to patient's condition, differential diagnosis, treatment plan and final disposition.                                          Differential diagnosis:    Acute GI bleed.  This is exacerbated by anticoagulation status.      Additional sources:    - Discussed/ obtained information from independent historians: I spoke with paramedics upon arrival and took report directly from them.  Vital signs were not overly concerning prehospital    - External (non-ED) record  review: I reviewed progress note from Dr. Phoenix dated 10/10/2024 when the patient was seen for bacterial pneumonia and COVID-pneumonia.    - Chronic or social conditions impacting care: Lifelong non-smoker.  Chronically anticoagulated        Orders placed during this visit:  Orders Placed This Encounter   Procedures    Critical Care    Gastrointestinal Panel, PCR - Stool, Per Rectum    CT Angiogram Abdomen Pelvis    Consult to Interventional Radiology    [place] Draw    Comprehensive Metabolic Panel    Lactic Acid, Plasma    CBC Auto Differential    BNP    Hemoglobin & Hematocrit, Blood    NPO Diet NPO Type: Strict NPO    Undress & Gown    Measure Blood Pressure    Vital Signs    Cycle blood pressure every 15 minutes please  Misc Nursing Order (Specify)    Verify Informed Consent    Pulse Oximetry    Oxygen Therapy- Nasal Cannula; Titrate 1-6 LPM Per SpO2; 90 - 95%    POC Occult Blood Stool    Type & Screen    ABO RH Specimen Verification    Prepare RBC, 1 Units    Insert Peripheral IV    ICU / CCU Bed Request ([facility] / [facility] / [facility] ONLY)    CBC & Differential    Green Top (Gel)    Lavender Top    Gold Top - SST    Gray Top    Light Blue Top         Additional orders considered but not ordered:  Noncontrasted CT scan abdomen pelvis    ED Course:    Consultants:   Interventional radiology.    Pulmonary critical care.    ED Course as of 04/26/25 2003   Sat Apr 26, 2025   1734 I had initially ordered 500 mL of normal saline but have added another 500 mL to that.  I have ordered Protonix IV. [MS]   1747 Recurrent BMs with blood..  I have ordered stool studies [MS]   1826 Radiology has been backed up but should soon be able to take the patient over for the CT angio with GI bleed protocol.  The patient continues to pass bright red blood per rectum. [MS]   1829 I have ordered a repeat H&H and asked his nurse to draw it. [MS]   1957 Hemoglobin(!): 8.7 [MS]      ED Course User Index  [MS] Norris Guaman MD              Shared  Decision Making:  After my consideration of clinical presentation and any laboratory/radiology studies obtained, I discussed the findings with the patient/patient representative who is in agreement with the treatment plan and the final disposition.   Risks and benefits of discharge and/or observation/admission were discussed.       AS OF 20:03 EDT VITALS:    BP - 113/53  HR - 59  TEMP - 98.3 °F (36.8 °C) (Oral)  O2 SATS - 95%                  DIAGNOSIS  Final diagnoses:   Gastrointestinal hemorrhage associated with anorectal source   Acute blood loss anemia         DISPOSITION  Admission to ICU      Please note that portions of this document were completed with voice recognition software.        Norris Guaman MD  04/26/25 2022

## 2025-04-27 PROBLEM — D62 ACUTE BLOOD LOSS ANEMIA: Status: ACTIVE | Noted: 2025-04-26

## 2025-04-27 LAB
ANION GAP SERPL CALCULATED.3IONS-SCNC: 5 MMOL/L (ref 5–15)
BUN SERPL-MCNC: 19 MG/DL (ref 8–23)
BUN/CREAT SERPL: 23.2 (ref 7–25)
CALCIUM SPEC-SCNC: 7.8 MG/DL (ref 8.6–10.5)
CHLORIDE SERPL-SCNC: 106 MMOL/L (ref 98–107)
CO2 SERPL-SCNC: 29 MMOL/L (ref 22–29)
CREAT SERPL-MCNC: 0.82 MG/DL (ref 0.76–1.27)
DEPRECATED RDW RBC AUTO: 74.2 FL (ref 37–54)
EGFRCR SERPLBLD CKD-EPI 2021: 91 ML/MIN/1.73
ERYTHROCYTE [DISTWIDTH] IN BLOOD BY AUTOMATED COUNT: 21.2 % (ref 12.3–15.4)
GLUCOSE BLDC GLUCOMTR-MCNC: 102 MG/DL (ref 70–130)
GLUCOSE BLDC GLUCOMTR-MCNC: 126 MG/DL (ref 70–130)
GLUCOSE BLDC GLUCOMTR-MCNC: 82 MG/DL (ref 70–130)
GLUCOSE BLDC GLUCOMTR-MCNC: 91 MG/DL (ref 70–130)
GLUCOSE SERPL-MCNC: 84 MG/DL (ref 65–99)
HCT VFR BLD AUTO: 24.8 % (ref 37.5–51)
HCT VFR BLD AUTO: 26.6 % (ref 37.5–51)
HCT VFR BLD AUTO: 27.2 % (ref 37.5–51)
HCT VFR BLD AUTO: 29.1 % (ref 37.5–51)
HGB BLD-MCNC: 7.5 G/DL (ref 13–17.7)
HGB BLD-MCNC: 7.9 G/DL (ref 13–17.7)
HGB BLD-MCNC: 8.4 G/DL (ref 13–17.7)
HGB BLD-MCNC: 9 G/DL (ref 13–17.7)
INR PPP: 1.36 (ref 0.89–1.12)
MAGNESIUM SERPL-MCNC: 1.5 MG/DL (ref 1.6–2.4)
MAGNESIUM SERPL-MCNC: 2.3 MG/DL (ref 1.6–2.4)
MCH RBC QN AUTO: 28.6 PG (ref 26.6–33)
MCHC RBC AUTO-ENTMCNC: 29.7 G/DL (ref 31.5–35.7)
MCV RBC AUTO: 96.4 FL (ref 79–97)
PHOSPHATE SERPL-MCNC: 3.1 MG/DL (ref 2.5–4.5)
PLATELET # BLD AUTO: 140 10*3/MM3 (ref 140–450)
PMV BLD AUTO: 9.8 FL (ref 6–12)
POTASSIUM SERPL-SCNC: 4.5 MMOL/L (ref 3.5–5.2)
PROTHROMBIN TIME: 17.6 SECONDS (ref 12.2–15.3)
RBC # BLD AUTO: 2.76 10*6/MM3 (ref 4.14–5.8)
SODIUM SERPL-SCNC: 140 MMOL/L (ref 136–145)
WBC NRBC COR # BLD AUTO: 9.03 10*3/MM3 (ref 3.4–10.8)

## 2025-04-27 PROCEDURE — 25010000002 MAGNESIUM SULFATE 2 GM/50ML SOLUTION: Performed by: STUDENT IN AN ORGANIZED HEALTH CARE EDUCATION/TRAINING PROGRAM

## 2025-04-27 PROCEDURE — P9016 RBC LEUKOCYTES REDUCED: HCPCS

## 2025-04-27 PROCEDURE — 36430 TRANSFUSION BLD/BLD COMPNT: CPT

## 2025-04-27 PROCEDURE — 94664 DEMO&/EVAL PT USE INHALER: CPT

## 2025-04-27 PROCEDURE — 86900 BLOOD TYPING SEROLOGIC ABO: CPT

## 2025-04-27 PROCEDURE — 94799 UNLISTED PULMONARY SVC/PX: CPT

## 2025-04-27 PROCEDURE — 63710000001 REVEFENACIN 175 MCG/3ML SOLUTION: Performed by: NURSE PRACTITIONER

## 2025-04-27 PROCEDURE — 85610 PROTHROMBIN TIME: CPT | Performed by: NURSE PRACTITIONER

## 2025-04-27 PROCEDURE — 99223 1ST HOSP IP/OBS HIGH 75: CPT | Performed by: NURSE PRACTITIONER

## 2025-04-27 PROCEDURE — 83735 ASSAY OF MAGNESIUM: CPT | Performed by: NURSE PRACTITIONER

## 2025-04-27 PROCEDURE — 25510000001 IOPAMIDOL 61 % SOLUTION

## 2025-04-27 PROCEDURE — 85014 HEMATOCRIT: CPT | Performed by: NURSE PRACTITIONER

## 2025-04-27 PROCEDURE — 85027 COMPLETE CBC AUTOMATED: CPT | Performed by: NURSE PRACTITIONER

## 2025-04-27 PROCEDURE — 82948 REAGENT STRIP/BLOOD GLUCOSE: CPT

## 2025-04-27 PROCEDURE — 85018 HEMOGLOBIN: CPT | Performed by: NURSE PRACTITIONER

## 2025-04-27 PROCEDURE — 25810000003 LACTATED RINGERS PER 1000 ML: Performed by: NURSE PRACTITIONER

## 2025-04-27 PROCEDURE — 84100 ASSAY OF PHOSPHORUS: CPT | Performed by: NURSE PRACTITIONER

## 2025-04-27 PROCEDURE — 83735 ASSAY OF MAGNESIUM: CPT | Performed by: STUDENT IN AN ORGANIZED HEALTH CARE EDUCATION/TRAINING PROGRAM

## 2025-04-27 PROCEDURE — 80048 BASIC METABOLIC PNL TOTAL CA: CPT | Performed by: NURSE PRACTITIONER

## 2025-04-27 PROCEDURE — 99291 CRITICAL CARE FIRST HOUR: CPT | Performed by: INTERNAL MEDICINE

## 2025-04-27 RX ORDER — ACETAMINOPHEN 325 MG/1
650 TABLET ORAL EVERY 6 HOURS PRN
Status: DISCONTINUED | OUTPATIENT
Start: 2025-04-27 | End: 2025-05-02 | Stop reason: HOSPADM

## 2025-04-27 RX ORDER — MAGNESIUM SULFATE HEPTAHYDRATE 40 MG/ML
2 INJECTION, SOLUTION INTRAVENOUS
Status: COMPLETED | OUTPATIENT
Start: 2025-04-27 | End: 2025-04-27

## 2025-04-27 RX ORDER — FINASTERIDE 5 MG/1
5 TABLET, FILM COATED ORAL DAILY
Status: DISCONTINUED | OUTPATIENT
Start: 2025-04-27 | End: 2025-05-02 | Stop reason: HOSPADM

## 2025-04-27 RX ORDER — TAMSULOSIN HYDROCHLORIDE 0.4 MG/1
0.4 CAPSULE ORAL DAILY
Status: DISCONTINUED | OUTPATIENT
Start: 2025-04-27 | End: 2025-05-02 | Stop reason: HOSPADM

## 2025-04-27 RX ORDER — PEG-3350, SODIUM SULFATE, SODIUM CHLORIDE, POTASSIUM CHLORIDE, SODIUM ASCORBATE AND ASCORBIC ACID 7.5-2.691G
1000 KIT ORAL
Status: COMPLETED | OUTPATIENT
Start: 2025-04-28 | End: 2025-04-28

## 2025-04-27 RX ORDER — PEG-3350, SODIUM SULFATE, SODIUM CHLORIDE, POTASSIUM CHLORIDE, SODIUM ASCORBATE AND ASCORBIC ACID 7.5-2.691G
1000 KIT ORAL
Status: COMPLETED | OUTPATIENT
Start: 2025-04-27 | End: 2025-04-27

## 2025-04-27 RX ADMIN — Medication 10 ML: at 08:08

## 2025-04-27 RX ADMIN — TAMSULOSIN HYDROCHLORIDE 0.4 MG: 0.4 CAPSULE ORAL at 11:45

## 2025-04-27 RX ADMIN — SODIUM CHLORIDE, SODIUM LACTATE, POTASSIUM CHLORIDE, CALCIUM CHLORIDE 100 ML/HR: 20; 30; 600; 310 INJECTION, SOLUTION INTRAVENOUS at 06:05

## 2025-04-27 RX ADMIN — MAGNESIUM SULFATE HEPTAHYDRATE 2 G: 40 INJECTION, SOLUTION INTRAVENOUS at 08:08

## 2025-04-27 RX ADMIN — PANTOPRAZOLE SODIUM 40 MG: 40 INJECTION, POWDER, FOR SOLUTION INTRAVENOUS at 08:07

## 2025-04-27 RX ADMIN — SODIUM CHLORIDE, SODIUM LACTATE, POTASSIUM CHLORIDE, CALCIUM CHLORIDE 100 ML/HR: 20; 30; 600; 310 INJECTION, SOLUTION INTRAVENOUS at 16:19

## 2025-04-27 RX ADMIN — BUDESONIDE AND FORMOTEROL FUMARATE DIHYDRATE 2 PUFF: 160; 4.5 AEROSOL RESPIRATORY (INHALATION) at 20:21

## 2025-04-27 RX ADMIN — MUPIROCIN 1 APPLICATION: 20 OINTMENT TOPICAL at 20:10

## 2025-04-27 RX ADMIN — BUDESONIDE AND FORMOTEROL FUMARATE DIHYDRATE 2 PUFF: 160; 4.5 AEROSOL RESPIRATORY (INHALATION) at 09:48

## 2025-04-27 RX ADMIN — PANTOPRAZOLE SODIUM 40 MG: 40 INJECTION, POWDER, FOR SOLUTION INTRAVENOUS at 16:33

## 2025-04-27 RX ADMIN — FINASTERIDE 5 MG: 5 TABLET, FILM COATED ORAL at 11:45

## 2025-04-27 RX ADMIN — Medication 10 ML: at 00:12

## 2025-04-27 RX ADMIN — REVEFENACIN 175 MCG: 175 SOLUTION RESPIRATORY (INHALATION) at 09:47

## 2025-04-27 RX ADMIN — PEG-3350, SODIUM SULFATE, SODIUM CHLORIDE, POTASSIUM CHLORIDE, SODIUM ASCORBATE AND ASCORBIC ACID 1000 ML: KIT at 18:44

## 2025-04-27 RX ADMIN — Medication 10 ML: at 20:10

## 2025-04-27 RX ADMIN — MAGNESIUM SULFATE HEPTAHYDRATE 2 G: 40 INJECTION, SOLUTION INTRAVENOUS at 03:56

## 2025-04-27 RX ADMIN — ACETAMINOPHEN 650 MG: 325 TABLET, FILM COATED ORAL at 06:20

## 2025-04-27 RX ADMIN — MUPIROCIN 1 APPLICATION: 20 OINTMENT TOPICAL at 08:08

## 2025-04-27 RX ADMIN — IOPAMIDOL 65 ML: 612 INJECTION, SOLUTION INTRAVENOUS at 00:08

## 2025-04-27 RX ADMIN — MAGNESIUM SULFATE HEPTAHYDRATE 2 G: 40 INJECTION, SOLUTION INTRAVENOUS at 06:05

## 2025-04-27 NOTE — PLAN OF CARE
Goal Outcome Evaluation:  Plan of Care Reviewed With: patient        Progress: no change     -Pt alert and oriented, denies pain/complaints.   -Given 2 units PRBCs. IR procedure completed today. Trending H&H.   -In rate controlled atrial fibrillation. Borderline MAPs for BP. O2 saturation >90%.   -Multiple clotty/liquid, bright red bloody stools.   -Adequate UOP for this shift.   -Replaced magnesium per protocol.

## 2025-04-27 NOTE — CONSULTS
[facility]:  Inpatient Gastroenterology Consult      Inpatient Gastroenterology Consult  Consult performed by: Isma Zimmerman APRN  Consult ordered by: Tenzin Cole APRN  Reason for consult: GI bleed      Referring Provider: No ref. provider found    PCP: Provider, No Known    Chief Complaint: GI bleed    History of present illness:    Jonatan Mcguire is a 75 y.o. male who is admitted with gastrointestinal bleeding.  GI consult received thereof.    Patient reports he is at his usual state health until yesterday when he went to have a bowel movement and noticed blood in the stool.  Reports he had a normal soft formed stool yesterday morning prior to the bleeding that started later in the day.  Reports some mild abdominal cramping but does not report it has any acute pain.  Does not report having any issues with nausea, vomiting, diarrhea, shortness of breath, chest pain, or fever/chills associated with onset.  Denies any high risk use of NSAIDs.  He is anticoagulated with Xarelto secondary to atrial fibrillation.  Abdominal surgical history is significant for a prior partial colectomy.  At time of presentation, CTA was obtained that showed active extravasation in the descending/sigmoid colon.  Interventional radiology was consulted; no bleed identified on interventional angiogram. Past medical, surgical, social, and family histories are reviewed for accuracy.  No documented alleviating or exacerbating factors.  Does not endorse pain at time of exam.    Allergies:  Tetanus toxoids    Scheduled Meds:  budesonide-formoterol, 2 puff, Inhalation, BID - RT  finasteride, 5 mg, Oral, Daily  insulin regular, 2-9 Units, Subcutaneous, Q6H  montelukast, 10 mg, Oral, Nightly  mupirocin, 1 Application, Each Nare, BID  pantoprazole, 40 mg, Intravenous, BID AC  revefenacin, 175 mcg, Nebulization, Daily - RT  senna-docusate sodium, 2 tablet, Oral, BID  sodium chloride, 10 mL, Intravenous,  Q12H  tamsulosin, 0.4 mg, Oral, Daily         Infusions:  lactated ringers, 100 mL/hr, Last Rate: 100 mL/hr (04/27/25 0605)        PRN Meds:    acetaminophen    senna-docusate sodium **AND** polyethylene glycol **AND** bisacodyl **AND** bisacodyl    Calcium Replacement - Follow Nurse / BPA Driven Protocol    dextrose    dextrose    glucagon (human recombinant)    ipratropium-albuterol    Magnesium Cardiology Dose Replacement - Follow Nurse / BPA Driven Protocol    nitroglycerin    ondansetron ODT **OR** ondansetron    Phosphorus Replacement - Follow Nurse / BPA Driven Protocol    Potassium Replacement - Follow Nurse / BPA Driven Protocol    sodium chloride    sodium chloride    sodium chloride    Home Meds:  Medications Prior to Admission   Medication Sig Dispense Refill Last Dose/Taking    acetaminophen (TYLENOL) 500 MG tablet Take 1 tablet by mouth Every 6 (Six) Hours As Needed for Mild Pain, Headache or Fever. OTC   Past Week    albuterol sulfate  (90 Base) MCG/ACT inhaler Inhale 1 puff Every 4 (Four) Hours As Needed for Wheezing or Shortness of Air.   Past Month    dilTIAZem CD (CARDIZEM CD) 180 MG 24 hr capsule Take 2 capsules by mouth Daily.   4/26/2025    Fluticasone-Salmeterol (Advair Diskus) 250-50 MCG/ACT DISKUS Inhale 2 puffs 2 (Two) Times a Day.   4/26/2025    insulin glargine (LANTUS, SEMGLEE) 100 UNIT/ML injection Inject 17 Units under the skin into the appropriate area as directed Every Night.   4/25/2025 Bedtime    lisinopril-hydrochlorothiazide (PRINZIDE,ZESTORETIC) 20-25 MG per tablet Take 0.5 tablets by mouth Daily.   4/26/2025    montelukast (SINGULAIR) 10 MG tablet Take 1 tablet by mouth Every Night.   4/25/2025 Bedtime    Prenatal Vit-Fe Fumarate-FA (Prenatal 27-1) 27-1 MG tablet tablet Take 1 tablet by mouth Daily. OTC   4/26/2025    rivaroxaban (XARELTO) 20 MG tablet Take 1 tablet by mouth Daily With Dinner.   4/25/2025 Evening    spironolactone (ALDACTONE) 25 MG tablet Take 0.5  tablets by mouth Daily.   4/26/2025    tiotropium (SPIRIVA) 18 MCG per inhalation capsule Place 1 capsule into inhaler and inhale Daily.   4/26/2025    atorvastatin (LIPITOR) 10 MG tablet Take 1 tablet by mouth Daily.   Unknown    doxazosin (CARDURA) 8 MG tablet Take 1 tablet by mouth Every Night.   Unknown    finasteride (PROSCAR) 5 MG tablet Take 1 tablet by mouth Daily.   Unknown    glipizide (GLUCOTROL XL) 5 MG ER tablet Take 1 tablet by mouth Daily.   Unknown    hydrALAZINE (APRESOLINE) 100 MG tablet Take 1 tablet by mouth Daily.   Unknown    levalbuterol (XOPENEX) 1.25 MG/3ML nebulizer solution Take 1 ampule by nebulization Every 4 (Four) Hours As Needed for Wheezing.   Unknown    tamsulosin (FLOMAX) 0.4 MG capsule 24 hr capsule Take 1 capsule by mouth Daily.   Unknown       ROS: Review of Systems   Constitutional: Negative.  Negative for activity change, appetite change, chills, diaphoresis, fatigue, fever and unexpected weight change.   HENT:  Negative for sore throat, trouble swallowing and voice change.    Eyes: Negative.    Respiratory:  Negative for apnea, cough, choking, chest tightness, shortness of breath, wheezing and stridor.    Cardiovascular:  Negative for chest pain, palpitations and leg swelling.   Gastrointestinal:  Positive for blood in stool. Negative for abdominal distention, abdominal pain, anal bleeding, constipation, diarrhea, nausea, rectal pain and vomiting.   Endocrine: Negative.    Genitourinary: Negative.    Musculoskeletal: Negative.    Skin: Negative.    Allergic/Immunologic: Negative.    Neurological: Negative.    Hematological:  Negative for adenopathy. Does not bruise/bleed easily.   Psychiatric/Behavioral: Negative.     All other systems reviewed and are negative.      PAST MED HX:  Past Medical History:   Diagnosis Date    Afib     COPD (chronic obstructive pulmonary disease)     Diabetes mellitus, type II     Hypertension     Obesity     Sleep apnea     Vitamin D deficiency   "      PAST SURG HX:  Past Surgical History:   Procedure Laterality Date    COLON RESECTION      NASAL POLYP EXCISION         FAM HX:  Family History   Problem Relation Age of Onset    No Known Problems Mother     No Known Problems Father        SOC HX:  Social History     Socioeconomic History    Marital status:    Tobacco Use    Smoking status: Never   Vaping Use    Vaping status: Never Used   Substance and Sexual Activity    Alcohol use: Never    Drug use: Never       PHYSICAL EXAM  /55   Pulse 71   Temp 97.7 °F (36.5 °C) (Oral)   Resp 18   Ht 180.3 cm (71\")   Wt 109 kg (240 lb)   SpO2 99%   BMI 33.47 kg/m²   Wt Readings from Last 3 Encounters:   04/26/25 109 kg (240 lb)   10/05/24 111 kg (244 lb 11.4 oz)   10/01/24 111 kg (245 lb)   ,body mass index is 33.47 kg/m².  Physical Exam  Vitals and nursing note reviewed.   Constitutional:       General: He is not in acute distress.     Appearance: Normal appearance. He is normal weight. He is not ill-appearing or toxic-appearing.   HENT:      Head: Normocephalic and atraumatic.   Eyes:      General: No scleral icterus.     Extraocular Movements: Extraocular movements intact.      Conjunctiva/sclera: Conjunctivae normal.      Pupils: Pupils are equal, round, and reactive to light.   Cardiovascular:      Rate and Rhythm: Normal rate and regular rhythm.      Pulses: Normal pulses.      Heart sounds: Normal heart sounds.   Pulmonary:      Effort: Pulmonary effort is normal. No respiratory distress.      Breath sounds: Normal breath sounds.   Abdominal:      General: Abdomen is flat. Bowel sounds are normal. There is no distension.      Palpations: Abdomen is soft. There is no mass.      Tenderness: There is no abdominal tenderness. There is no guarding or rebound.      Hernia: No hernia is present.   Genitourinary:     Rectum: Guaiac result positive.   Musculoskeletal:      Right lower leg: No edema.      Left lower leg: No edema.   Skin:     General: " Skin is warm and dry.      Capillary Refill: Capillary refill takes less than 2 seconds.      Coloration: Skin is not jaundiced or pale.   Neurological:      General: No focal deficit present.      Mental Status: He is alert and oriented to person, place, and time.   Psychiatric:         Mood and Affect: Mood normal.         Behavior: Behavior normal.         Thought Content: Thought content normal.         Judgment: Judgment normal.         Results Review:   I reviewed the patient's new clinical results.  I reviewed the patient's new imaging results and agree with the interpretation.  I reviewed the patient's other test results and agree with the interpretation  I personally viewed and interpreted the patient's EKG/Telemetry data    Lab Results   Component Value Date    WBC 9.03 04/27/2025    HGB 8.4 (L) 04/27/2025    HGB 7.9 (L) 04/27/2025    HGB 8.7 (L) 04/26/2025    HCT 27.2 (L) 04/27/2025    MCV 96.4 04/27/2025     04/27/2025       Lab Results   Component Value Date    INR 1.36 (H) 04/27/2025    INR 1.50 (H) 04/26/2025       Lab Results   Component Value Date    GLUCOSE 84 04/27/2025    BUN 19 04/27/2025    CREATININE 0.82 04/27/2025    BCR 23.2 04/27/2025     04/27/2025    K 4.5 04/27/2025    CO2 29.0 04/27/2025    CALCIUM 7.8 (L) 04/27/2025    ALBUMIN 3.5 04/26/2025    ALKPHOS 45 04/26/2025    BILITOT 0.5 04/26/2025    ALT 23 04/26/2025    AST 24 04/26/2025       IR Artery Embolization Occlusion  Result Date: 4/27/2025  PROCEDURE: Mesenteric angiography  Procedural Personnel Attending physician(s): Johnathan Rodriguez  Pre-procedure diagnosis: GI bleeding Post-procedure diagnosis: Same Indication: Gastrointestinal bleeding Additional clinical history: Patient is on 20 mg Xarelto and has a history of a right partial hemicolectomy.  Complications: No immediate complications.       Angiography of the celiac, SMA, and JESSENIA demonstrates no active extravasation.   _______________________________________________________________  PROCEDURE SUMMARY: - Arterial access with ultrasound guidance - Selective mesenteric angiography: Celiac, superior mesenteric, and inferior mesenteric angiography - Superselective mesenteric angiography: Performed as described below - Additional procedure(s): Right internal iliac angiography  PROCEDURE DETAILS:  Pre-procedure Consent: Informed consent for the procedure including risks, benefits and alternatives was obtained and time-out was performed prior to the procedure. Preparation: The site was prepared and draped using maximal sterile barrier technique including cutaneous antisepsis.  Anesthesia/sedation Level of anesthesia/sedation: Moderate sedation (conscious sedation) Anesthesia/sedation administered by: Independent trained observer under attending supervision with continuous monitoring of the patient?s level of consciousness and physiologic status Total intra-service sedation time (minutes): 78  Access Local anesthesia was administered. The vessel was sonographically evaluated and judged to be patent. Real time ultrasound was used to visualize needle entry into the vessel and a permanent image was not stored. A 6 Georgian sheath was placed. Vessel accessed: Right common femoral artery Access technique: Micropuncture set with 21 gauge needle  Mesenteric angiography The mesenteric arterial system was catheterized using a 5 Georgian reverse curve catheter.  Variant anatomy: None  Vessel catheterized: Celiac artery Findings: No active extravasation.  Vessel catheterized: Superior mesenteric artery Findings: No active extravasation  Vessel catheterized: Multiple branches of the superior mesenteric artery were interrogated including 3 which perfuse the area of interest seen on recent CTA. Findings: Angiography was performed. No active extravasation was noted. No embolization was performed.  Vessel catheterized: Inferior mesenteric artery Findings: No  active extravasation. Unfortunately this angiographic run was not permanently stored.  Closure Access site angiography performed: Yes Findings: Patent vessel with appropriate access level Arterial closure technique: Angioseal Hemostasis achieved from closure technique: Yes Duration of manual compression (minutes): 2  Contrast Contrast agent: Visipaque 320 Contrast volume (mL): 65  Radiation Dose Fluoroscopy time (minutes): 7.6   Additional Details Additional description of procedure: None Equipment details: None Specimens removed: None Estimated blood loss (mL): 20      4/27/2025 12:03 AM by Johnathan Rodriguez MD on Workstation: [id number]      CT Angiogram Abdomen Pelvis  Result Date: 4/26/2025  CT ANGIOGRAM ABDOMEN PELVIS Date of Exam: 4/26/2025 6:34 PM EDT Indication: Brisk bright red blood per rectum GI bleed protocol. Comparison: None available. Technique: CTA of the abdomen and pelvis was performed after the uneventful intravenous administration of 100 mL Isovue-370. Reconstructed coronal and sagittal images were also obtained. In addition, a 3-D volume rendered image was created for interpretation. Automated exposure control and iterative reconstruction methods were used. Findings: Partial atelectasis in the included right lower lobe. Cardiomegaly. Right gynecomastia included in the lower chest. Cholelithiasis without pericholecystic inflammation. Punctate nonobstructing left nephrolithiasis. 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 Bilateral renal cysts. Adrenal glands, spleen, liver appear unremarkable. No abnormal bowel distention. Extravasation of contrast is seen into the colon at the junction of the descending colon and sigmoid colon (image 106, series 5, image 103, series 7). There is sigmoid colon diverticulosis. No abdominal aortic aneurysm or dissection. Duplicated renal arteries. Urinary bladder is not well distended. No significant free fluid or lymphadenopathy. Multilevel  degenerative changes in the lumbar spine. No acute osseous abnormality is identified.     Impression: 1.Extravasation of contrast into the colon at the junction of the descending colon and sigmoid colon, consistent with history of GI bleed. 2.Multiple pancreatic cystic lesions, measuring up to 3.7 cm, which can be further evaluated with outpatient MRI pancreas protocol. 3.Cholelithiasis. Electronically Signed: Mirna Roberts MD  4/26/2025 7:06 PM EDT  Workstation ID: [id number]       ASSESSMENTS/PLANS  1.  Acute lower gastrointestinal bleeding  2.  Acute blood loss anemia, symptomatic anemia  3.  Atrial fibrillation, anticoagulated on Xarelto  4.  History of prior partial colectomy  5.  COPD.  6.  Abnormal CT imaging: Contrast extravasation on CTA, multiple pancreatic cystic lesions appreciated.    Jonatan Mcguire is a 75 y.o. male who presents to hospital acute lower gastrointestinal bleeding.  CTA with contrast extravasation noted at the sigmoid/descending colon-interventional angiogram obtained with interventional radiology without extravasation on that examination.  Given patient's bleeding, recommend colonoscopy in a.m. for further evaluation.  In terms of pancreatic cystic lesions, will need outpatient MRI with and without contrast for further evaluation in conjunction with outpatient GI follow-up.    >>> N.p.o. at midnight; clear liquid diet remainder of day  >>> Colonoscopy in a.m.  >>> MoviPrep: Give 8 ounces every 15 minutes ×2 L starting at 4 PM.  Repeat dose at 5 AM tomorrow.  Must finish both doses in 2 hours.  Stool should look like lemonade when finished.  Please call ENDO at [phone] at 7:30am if not clear.  >>> GI prophylaxis PPI  >>> Monitor H&H and transfuse per protocol  >>> Hold anticoagulation in setting of acute anemia    I discussed the patient's findings and my recommendations with patient, nursing staff, and consulting provider    SHALOM Day  04/27/25  11:35 EDT

## 2025-04-27 NOTE — H&P
INTENSIVIST   PROGRESS NOTE          SUBJECTIVE     Jonatan 75 y.o. male is followed for:Black or Bloody Stool       GIB (gastrointestinal bleeding)    COPD (chronic obstructive pulmonary disease)    Afib    Chronic anticoagulation    Diabetes mellitus, type II    Sleep apnea    Hypertension    As an Intensivist, we provide an integrated approach to the ICU patient and family, medical management of comorbid conditions, including but not limited to electrolytes, glycemic control, organ dysfunction, lead interdisciplinary rounds and coordinate the care with all other services, including those from other specialists.     HPI:  Jonatan is a 75 y.o. male with PMH COPD with chronic hypoxic respiratory failure 2 L baseline, DM, hypertension, NEW, A-fib on Xarelto who presented to this Hospital on 4/26/2025 because of bright blood per rectum 6-7 times prior to arrival.  No history of GI bleed.  On Xarelto for atrial fibrillation, not new medication.  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  Denies hematemesis, abdominal pain, nausea or vomiting.  No use of NSAIDs. In the ED hemoglobin initially 10.5 with follow-up at 8.7.  No leukocytosis, lactate 1.7, renal function and electrolytes within normal limits.  CT angio abdomen pelvis showed extravasation into the colon at junction of descending colon and sigmoid colon as well as multiple pancreatic cystic lesions.  Patient was given Kcentra 1 L IV fluids, PPI.  2 units of packed red blood cells were ordered for transfusion.  IR was contacted by ED physician, plan to take for angio pending coags.  Patient subsequently admitted to the ICU.       PMH: He  has a past medical history of Afib, COPD (chronic obstructive pulmonary disease), Diabetes mellitus, type II, Hypertension, Obesity, Sleep apnea, and Vitamin D deficiency.   PSxH: He  has a past surgical history that includes Colectomy and Nasal polyp excision.     Medications:  No current  facility-administered medications on file prior to encounter.     Current Outpatient Medications on File Prior to Encounter   Medication Sig    acetaminophen (TYLENOL) 500 MG tablet Take 1 tablet by mouth Every 6 (Six) Hours As Needed for Mild Pain, Headache or Fever. OTC    albuterol sulfate  (90 Base) MCG/ACT inhaler Inhale 1 puff Every 4 (Four) Hours As Needed for Wheezing or Shortness of Air.    dilTIAZem CD (CARDIZEM CD) 180 MG 24 hr capsule Take 2 capsules by mouth Daily.    Fluticasone-Salmeterol (Advair Diskus) 250-50 MCG/ACT DISKUS Inhale 2 puffs 2 (Two) Times a Day.    insulin glargine (LANTUS, SEMGLEE) 100 UNIT/ML injection Inject 17 Units under the skin into the appropriate area as directed Every Night.    lisinopril-hydrochlorothiazide (PRINZIDE,ZESTORETIC) 20-25 MG per tablet Take 0.5 tablets by mouth Daily.    montelukast (SINGULAIR) 10 MG tablet Take 1 tablet by mouth Every Night.    Prenatal Vit-Fe Fumarate-FA (Prenatal 27-1) 27-1 MG tablet tablet Take 1 tablet by mouth Daily. OTC    rivaroxaban (XARELTO) 20 MG tablet Take 1 tablet by mouth Daily With Dinner.    spironolactone (ALDACTONE) 25 MG tablet Take 0.5 tablets by mouth Daily.    tiotropium (SPIRIVA) 18 MCG per inhalation capsule Place 1 capsule into inhaler and inhale Daily.    doxazosin (CARDURA) 8 MG tablet Take 1 tablet by mouth Every Night.    glipizide (GLUCOTROL XL) 5 MG ER tablet Take 1 tablet by mouth Daily.    hydrALAZINE (APRESOLINE) 100 MG tablet Take 1 tablet by mouth Daily.    levalbuterol (XOPENEX) 1.25 MG/3ML nebulizer solution Take 1 ampule by nebulization Every 4 (Four) Hours As Needed for Wheezing.    [DISCONTINUED] benzonatate (TESSALON) 100 MG capsule Take 1 capsule by mouth 3 (Three) Times a Day As Needed for Cough for up to 30 doses.        Allergies: He is allergic to tetanus toxoids.   FH: His family history includes No Known Problems in his father and mother.   SH: He  reports that he has never smoked. He does  not have any smokeless tobacco history on file. He reports that he does not drink alcohol and does not use drugs.     The patient's relevant past medical, surgical and social history were reviewed and updated in Epic as appropriate.                 Review of Systems  As described in the HPI.       OBJECTIVE     Vitals:  Temp: 98.3 °F (36.8 °C) (25 1630) Temp  Min: 98.3 °F (36.8 °C)  Max: 98.3 °F (36.8 °C)   Temp core:      BP: 130/64 (25) BP  Min: 98/54  Max: 152/63   MAP (non-invasive) Noninvasive MAP (mmHg): 82 (25) Noninvasive MAP (mmHg)  Av.9  Min: 67  Max: 101   Pulse: 62 (25) Pulse  Min: 57  Max: 76   Resp: 18 (25) Resp  Min: 18  Max: 20   SpO2: 94 % (25) SpO2  Min: 93 %  Max: 96 %   Device: room air (25)    Flow Rate:   No data recorded         25   Weight: 109 kg (240 lb)        Intake/Ouptut 24 hrs (7:00AM - 6:59 AM)  Intake & Output (last 2 days)          0701   0700  0701   0700    IV Piggyback  1000    Total Intake(mL/kg)  1000 (9.2)    Urine (mL/kg/hr)  360    Stool  1120    Total Output  1480    Net  -480                  Medications (drips):  lactated ringers        Physical Examination  Telemetry:  Rhythm: atrial rhythm (25)  Atrial Rhythm: atrial fibrillation (25)      Constitutional:  No acute distress.   Cardiovascular: IRR.    Respiratory: Normal breath sounds  No adventitious sounds   Abdominal:  Soft with no tenderness.   Extremities: No Edema   Neurological:   Alert, Oriented, Cooperative.  Best Eye Response: 4-->(E4) spontaneous (25)  Best Motor Response: 6-->(M6) obeys commands (25)  Best Verbal Response: 5-->(V5) oriented (25)  Henry Coma Scale Score: 15 (250)          Lines, Drains & Airways       Active LDAs       Name Placement date Placement time Site Days    Peripheral IV 10/05/24 1800 Left;Posterior;Lateral  Forearm 10/05/24  1800  Forearm  203    Peripheral IV 04/26/25 1633 18 G Anterior;Left Forearm 04/26/25  1633  Forearm  less than 1    Peripheral IV 04/26/25 1758 18 G Anterior;Distal;Left;Upper Arm 04/26/25  1758  Arm  less than 1                    Results Reviewed:  Laboratory  Microbiology  Radiology  Pathology    Hematology:  Results from last 7 days   Lab Units 04/26/25  1905 04/26/25  1632   WBC 10*3/mm3  --  10.30   HEMOGLOBIN g/dL 8.7* 10.5*   MCV fL  --  94.1   PLATELETS 10*3/mm3  --  173     Results from last 7 days   Lab Units 04/26/25  1632   NEUTROS ABS 10*3/mm3 6.63   LYMPHS ABS 10*3/mm3 2.81   EOS ABS 10*3/mm3 0.01     Chemistry:  Estimated Creatinine Clearance: 85.8 mL/min (by C-G formula based on SCr of 0.92 mg/dL).    Results from last 7 days   Lab Units 04/26/25  1632   SODIUM mmol/L 141   POTASSIUM mmol/L 4.8   CHLORIDE mmol/L 103   CO2 mmol/L 29.0   BUN mg/dL 22   CREATININE mg/dL 0.92   GLUCOSE mg/dL 166*     Results from last 7 days   Lab Units 04/26/25  1632   CALCIUM mg/dL 8.9       Hepatic Panel:  Results from last 7 days   Lab Units 04/26/25  1632   ALBUMIN g/dL 3.5   TOTAL PROTEIN g/dL 6.0   BILIRUBIN mg/dL 0.5   AST (SGOT) U/L 24   ALT (SGPT) U/L 23   ALK PHOS U/L 45        Coagulation Labs:         Cardiac Labs:  Results from last 7 days   Lab Units 04/26/25  1632   PROBNP pg/mL 558.5       Biomarkers:  Results from last 7 days   Lab Units 04/26/25  1632   LACTATE mmol/L 1.7       U/A              COVID-19  Lab Results   Component Value Date    COVID19 Detected (C) 10/02/2024       Arterial Blood Gases:        Images:  CT Angiogram Abdomen Pelvis  Result Date: 4/26/2025  Impression: 1.Extravasation of contrast into the colon at the junction of the descending colon and sigmoid colon, consistent with history of GI bleed. 2.Multiple pancreatic cystic lesions, measuring up to 3.7 cm, which can be further evaluated with outpatient MRI pancreas protocol. 3.Cholelithiasis. Electronically  Signed: Mirna Roberts MD  4/26/2025 7:06 PM EDT  Workstation ID: [id number]      Echo:      Results: Reviewed.  I reviewed the patient's new laboratory and imaging results.  I independently reviewed the patient's new images.    Medications: Reviewed.    Assessment    A/P     Hospital:  LOS: 0 days   ICU: 1m     Active Hospital Problems    Diagnosis  POA    **GIB (gastrointestinal bleeding) [K92.2]  Yes    Chronic anticoagulation [Z79.01]  Not Applicable    COPD (chronic obstructive pulmonary disease) [J44.9]  Unknown    Afib [I48.91]  Unknown    Diabetes mellitus, type II [E11.9]  Unknown    Sleep apnea [G47.30]  Unknown    Hypertension [I10]  Unknown     Jonatan is a 75 y.o. male admitted on 4/26/2025 with GIB (gastrointestinal bleeding) [K92.2]    Assessment/Management/Treatment Plan:    //Lower GI bleed  //Acute blood loss anemia  //Home anticoagulant use, Xarelto for A-fib  //Multiple pancreatic cystic lesions, largest 3.7 cm  //PMH:  COPD with chronic hypoxic respiratory failure 2 L baseline, DM, hypertension, NEW    Pulmonary   2 L, at baseline.  Titrate for goal O2 sat greater than 90%.  Symbicort, yupelri  Cardiovascular  Lactic acid 1.7.  Status post 1 L IV fluids.  Continue LR at 100/h.  MAP greater than 65 without vasopressor requirement at this time.  Hold home Xarelto and dilt for history of A-fib  Neuro  No issues  GI/Hepatology  CTA abdomen pelvis reviewed with active bleed at descending colon and sigmoid colon junction.  IR consulted, planning to take back for angio pending INR.  Consult GI as well.  Hemoglobin 10.5-8.7, transfusing 2 units packed red blood cells.  Monitor H&H every 6 hours, transfuse for hemoglobin less than 7 or less than 8 if actively bleeding.  Fibrinogen and PT/INR pending.  Kcentra given home Xarelto.  Hold all antiplatelets or anticoagulants.  N.p.o.  Incidental finding of multiple pancreatic lesions measuring up to 3.7 cm on CTA abdomen pelvis.  Will need outpatient MRI pancreas  "protocol for further evaluation.  Renal  Creatinine and electrolytes within normal limits.  Repeat in AM.  ID/Antibiotics  No antibiotics indicated  Hematology  Monitoring and transfusing as above.  SCDs for DVT prophylaxis.  Chemical contraindicated.  Endocrine  Body mass index is 33.47 kg/m². Obese Class I: 30-34.9kg/m2  Type 2 diabetes.. Accuchecks q6hr + sliding scale insulin. Hold home CLAY.    No results found for: \"HGBA1C\"        Diet: NPO Diet NPO Type: Strict NPO  No active supplement orders      Advance Directives: Code Status and Medical Interventions: CPR (Attempt to Resuscitate); Full Support   Ordered at: 04/26/25 2013     Code Status (Patient has no pulse and is not breathing):    CPR (Attempt to Resuscitate)     Medical Interventions (Patient has pulse or is breathing):    Full Support        VTE Prophylaxis:  Mechanical VTE prophylaxis orders are present.           Disposition: Admit to ICU    Plan of care and goals reviewed during interdisciplinary rounds.  I discussed the patient's findings and my recommendations with patient and nursing staff    Time: 40 minutes of critical care provided. This time excludes other billable procedures. Time does include preparation of documents, medical consultations, review of old records, and direct bedside care. Patient is at high risk for life-threatening deterioration due to Gastrointestinal Bleeding.    He has a high risk of imminent or life-threatening  deterioration, which requires the highest level of physician preparedness to intervene urgently. I devoted my full attention to the direct care of this patient for the amount of time indicated above.     Time spent with family or surrogate(s) is included only if the patient was incapable of providing the necessary information or participating in medical decision making.        Gretchen Garcia MD  Pulmonary Critical Care Medicine     "

## 2025-04-27 NOTE — SEDATION DOCUMENTATION
Pre-anesthesia evaluation performed: Yes    PRE-OPERATIVE NOTES    Time: 22:04 EDT    Planned Sedation: Moderate sedation    Risks,Benefits, Complications And Alternatives Discussed with Patients: Yes      Patient status classification of the American Society of Anesthesiologists      Patient re-evaluated for appropriateness of Conscious Sedation: Yes      ASA SCALE ASSESSMENT:  3-Severe systemic disease that results in functional limitation    MALLAMPATI CLASSIFICATION:  3-Only the soft palate and base of uvula are visible    Provider signature:  Johnathan Rodriguez MD  22:04 EDT  04/26/25

## 2025-04-27 NOTE — PRE-PROCEDURE NOTE
[facility]   Interventional Radiology H&P    Patient Name: Jonatan Mcguire  : 1950  MRN: [id number]  Primary Care Physician:  Provider, No Known  Referring Physician: No ref. provider found  Date of admission: 2025    Subjective   Subjective     HPI:  Jonatan Mcguire is a 75 y.o. male with a history of GI bleeding seen on recent CTA.  Patient has a history of A-fib on Xarelto.  Patient has received Kcentra.  Currently has his first unit of blood transfusing and has only received IV fluids to this point.  IR consulted for angiography and possible embolization.    Review of Systems:   Constitutional no fever,  no weight loss       Otolaryngeal no difficulty swallowing   Cardiovascular no chest pain   Pulmonary no cough, no sputum production   Gastrointestinal no constipation, no diarrhea                         Personal History       Past Medical/Surgical History:   Past Medical History:   Diagnosis Date    Afib     COPD (chronic obstructive pulmonary disease)     Diabetes mellitus, type II     Hypertension     Obesity     Sleep apnea     Vitamin D deficiency      Past Surgical History:   Procedure Laterality Date    COLON RESECTION      NASAL POLYP EXCISION         Social History:  reports that he has never smoked. He does not have any smokeless tobacco history on file. He reports that he does not drink alcohol and does not use drugs.    Medications:  Medications Prior to Admission   Medication Sig Dispense Refill Last Dose/Taking    acetaminophen (TYLENOL) 500 MG tablet Take 1 tablet by mouth Every 6 (Six) Hours As Needed for Mild Pain, Headache or Fever. OTC   Past Week    albuterol sulfate  (90 Base) MCG/ACT inhaler Inhale 1 puff Every 4 (Four) Hours As Needed for Wheezing or Shortness of Air.   Past Month    dilTIAZem CD (CARDIZEM CD) 180 MG 24 hr capsule Take 2 capsules by mouth Daily.   2025    Fluticasone-Salmeterol (Advair Diskus) 250-50 MCG/ACT DISKUS Inhale 2 puffs 2 (Two) Times a Day.    4/26/2025    insulin glargine (LANTUS, SEMGLEE) 100 UNIT/ML injection Inject 17 Units under the skin into the appropriate area as directed Every Night.   4/25/2025 Bedtime    lisinopril-hydrochlorothiazide (PRINZIDE,ZESTORETIC) 20-25 MG per tablet Take 0.5 tablets by mouth Daily.   4/26/2025    montelukast (SINGULAIR) 10 MG tablet Take 1 tablet by mouth Every Night.   4/25/2025 Bedtime    Prenatal Vit-Fe Fumarate-FA (Prenatal 27-1) 27-1 MG tablet tablet Take 1 tablet by mouth Daily. OTC   4/26/2025    rivaroxaban (XARELTO) 20 MG tablet Take 1 tablet by mouth Daily With Dinner.   4/25/2025 Evening    spironolactone (ALDACTONE) 25 MG tablet Take 0.5 tablets by mouth Daily.   4/26/2025    tiotropium (SPIRIVA) 18 MCG per inhalation capsule Place 1 capsule into inhaler and inhale Daily.   4/26/2025    atorvastatin (LIPITOR) 10 MG tablet Take 1 tablet by mouth Daily.       doxazosin (CARDURA) 8 MG tablet Take 1 tablet by mouth Every Night.   Unknown    finasteride (PROSCAR) 5 MG tablet Take 1 tablet by mouth Daily.       glipizide (GLUCOTROL XL) 5 MG ER tablet Take 1 tablet by mouth Daily.   Unknown    hydrALAZINE (APRESOLINE) 100 MG tablet Take 1 tablet by mouth Daily.   Unknown    levalbuterol (XOPENEX) 1.25 MG/3ML nebulizer solution Take 1 ampule by nebulization Every 4 (Four) Hours As Needed for Wheezing.   Unknown    tamsulosin (FLOMAX) 0.4 MG capsule 24 hr capsule Take 1 capsule by mouth Daily.        Current medications:  budesonide-formoterol, 2 puff, Inhalation, BID - RT  fentaNYL citrate (PF), , ,   [START ON 4/27/2025] insulin regular, 2-9 Units, Subcutaneous, Q6H  iopamidol, , ,   midazolam, , ,   montelukast, 10 mg, Oral, Nightly  mupirocin, 1 Application, Each Nare, BID  [START ON 4/27/2025] pantoprazole, 40 mg, Intravenous, BID AC  [START ON 4/27/2025] revefenacin, 175 mcg, Nebulization, Daily - RT  senna-docusate sodium, 2 tablet, Oral, BID  sodium chloride, 10 mL, Intravenous, Q12H      Current IV  "drips:  lactated ringers, 100 mL/hr, Last Rate: 100 mL/hr (04/26/25 2038)        Allergies:  Allergies   Allergen Reactions    Tetanus Toxoids Unknown - Low Severity       Objective    Objective     Vitals:   Temp:  [98.3 °F (36.8 °C)-98.6 °F (37 °C)] 98.3 °F (36.8 °C)  Heart Rate:  [57-76] 57  Resp:  [18-20] 18  BP: ()/(51-76) 113/60  Flow (L/min) (Oxygen Therapy):  [2] 2      Physical Exam:   Constitutional: Awake, alert, No acute distress    Respiratory: nonlabored respirations    Gastrointestinal: soft, nontender, nondistended           Result Review        Result Review:     Sodium   Date Value Ref Range Status   04/26/2025 141 136 - 145 mmol/L Final       Potassium   Date Value Ref Range Status   04/26/2025 4.8 3.5 - 5.2 mmol/L Final       Chloride   Date Value Ref Range Status   04/26/2025 103 98 - 107 mmol/L Final       No results found for: \"PLASMABICARB\"    BUN   Date Value Ref Range Status   04/26/2025 22 8 - 23 mg/dL Final       Creatinine   Date Value Ref Range Status   04/26/2025 0.92 0.76 - 1.27 mg/dL Final       Calcium   Date Value Ref Range Status   04/26/2025 8.9 8.6 - 10.5 mg/dL Final           No components found for: \"GLUCOSE.*\"  Results from last 7 days   Lab Units 04/26/25  1905 04/26/25  1632   WBC 10*3/mm3  --  10.30   HEMOGLOBIN g/dL 8.7* 10.5*   HEMATOCRIT % 29.5* 35.0*   PLATELETS 10*3/mm3  --  173      Results from last 7 days   Lab Units 04/26/25  1632   INR  1.50*           Assessment / Plan     Assesment:  75-year-old male with a history of GI bleeding.  Patient is currently receiving his first unit of blood.  Patient has only received IV fluids to this point and Kcentra.  CTA was performed prior to receiving medical therapy.      Plan:   Angiography and possible embolization of GI bleed with moderate sedation.    The risks and benefits of the procedure were discussed with the patient.    Electronically signed by Johnathan Rodriguez MD, 04/26/25, 10:01 PM EDT.    "

## 2025-04-27 NOTE — NURSING NOTE
Patient transported to IR from ICU for embolization of GI bleed with Dr Rodriguez. ICU nurse Daniel present at bedside. 1U of PRBCs transfusing. Reveived 1 mg of versed & 50 mcg of fentanyl with sedation time of 78 minutes. Access gained via 6 fr sheath to right common femoral artery. No active bleed found & sheath removed. Access closed with Angio-Seal & dressing applied; dry & intact. Patient transport to ICU with Daniel.

## 2025-04-27 NOTE — POST-PROCEDURE NOTE
IR POST OP NOTE    Physician:Johnathan Rodriguez MD      Procedure: Visceral angiogram.      Pre Op DX: GI bleed.      Post Op DX: No active extravasation.      Anesthesia: Moderate sedation      Findings: Multiple territories with selective and superselective angiography were interrogated.  Particularly within the left hemicolon given findings on prior CTA.  No active extravasation was noted.  No embolization was performed.      Complications: No immediate      Provider Signature: Johnathan Rodriguez MD    04/26/25  23:50 EDT

## 2025-04-27 NOTE — PROGRESS NOTES
Pulmonary/Critical Care Follow-up     LOS: 1 day   Patient Care Team:  Provider, No Known as PCP - General  Provider, No Known as PCP - Family Medicine        Chief Complaint   Patient presents with    Black or Bloody Stool     Subjective   history reviewed and updated in EMR as indicated    Interval History:     Patient had mesenteric angiogram this morning with no evidence of active extravasation of contrast and no intervention.  He does not appear to be currently bleeding.  Plan for endoscopy tomorrow per gastroenterology.    History taken from: Patient, staff, chart    PMH/FH/Social History were reviewed and updated appropriately in the electronic medical record.     Review of Systems:    Review of 14 systems was completed with positives and pertinent negatives noted in the subjective section.  All other systems reviewed and are negative.         Objective     Vital Signs  Temp:  [97.5 °F (36.4 °C)-98.6 °F (37 °C)] 97.7 °F (36.5 °C)  Heart Rate:  [51-85] 59  Resp:  [17-23] 18  BP: ()/(47-76) 89/50  04/26 0701 - 04/27 0700  In: 2134.7 [I.V.:784.7]  Out: 2830 [Urine:1210]  Body mass index is 33.47 kg/m².     IV drips:  lactated ringers, Last Rate: 100 mL/hr (04/27/25 0605)       Physical Exam:     Constitutional:   Alert, in no acute distress   Head:   Normocephalic, atraumatic   Eyes:           Lids and lashes normal, conjunctivae and sclerae normal.  PER   ENMT:  Ears appear intact with no abnormalities noted     Lips normal.     Neck:  Trachea midline, no JVD   Lungs/Resp:    Normal effort, symmetric chest rise, no crepitus, clear to      auscultation bilaterally.               Heart/CV:   Regular rhythm and normal rate, no murmur   Abdomen/GI:    Soft, nontender, nondistended   :    Deferred   Extremities/MSK:  No clubbing or cyanosis.  2+ bilateral lower extremity edema with thickened/scaly skin of lower extremities.     Pulses:  Pulses palpable and equal bilaterally   Skin:  No bleeding, bruising or  rash   Heme/Lymph:  No cervical or supraclavicular adenopathy.   Neurologic:    Psychiatric:    Moves all extremities with no obvious focal motor deficit.  Cranial nerves 2 - 12 grossly intact  Non-agitated, normal affect.    The above physical exam findings were reviewed and reflect my exam findings as of today's exam.   Electronically signed by:  Montana Sy MD  04/27/25  08:59 EDT      Results Review:     I reviewed the patient's new clinical results.   Results from last 7 days   Lab Units 04/27/25  0211 04/26/25  1632   SODIUM mmol/L 140 141   POTASSIUM mmol/L 4.5 4.8   CHLORIDE mmol/L 106 103   CO2 mmol/L 29.0 29.0   BUN mg/dL 19 22   CREATININE mg/dL 0.82 0.92   CALCIUM mg/dL 7.8* 8.9   BILIRUBIN mg/dL  --  0.5   ALK PHOS U/L  --  45   ALT (SGPT) U/L  --  23   AST (SGOT) U/L  --  24   GLUCOSE mg/dL 84 166*     Results from last 7 days   Lab Units 04/27/25  0211 04/26/25  1905 04/26/25  1632   WBC 10*3/mm3 9.03  --  10.30   HEMOGLOBIN g/dL 7.9* 8.7* 10.5*   HEMATOCRIT % 26.6* 29.5* 35.0*   PLATELETS 10*3/mm3 140  --  173         Results from last 7 days   Lab Units 04/27/25  0211   MAGNESIUM mg/dL 1.5*   PHOSPHORUS mg/dL 3.1       I reviewed the patient's new imaging including images and reports.    Mesenteric angiography 4/27/2025:    IMPRESSION:     Angiography of the celiac, SMA, and JESSENIA demonstrates no active  extravasation.     Medication Review:   budesonide-formoterol, 2 puff, Inhalation, BID - RT  insulin regular, 2-9 Units, Subcutaneous, Q6H  magnesium sulfate, 2 g, Intravenous, Q2H  montelukast, 10 mg, Oral, Nightly  mupirocin, 1 Application, Each Nare, BID  pantoprazole, 40 mg, Intravenous, BID AC  revefenacin, 175 mcg, Nebulization, Daily - RT  senna-docusate sodium, 2 tablet, Oral, BID  sodium chloride, 10 mL, Intravenous, Q12H      lactated ringers, 100 mL/hr, Last Rate: 100 mL/hr (04/27/25 0605)        Assessment & Plan         GIB (gastrointestinal bleeding)    COPD (chronic obstructive  pulmonary disease)    Afib    Chronic anticoagulation    Diabetes mellitus, type II    Sleep apnea    Hypertension    75 y.o. with history of reported COPD, chronic hypoxemic respiratory failure on 2 L baseline, T2DM, HTN, NEW, A-fib on Xarelto, presented to [facility] 4/26/2025 with bright red blood per rectum 6-7 times prior to arrival.  He has no prior history of GI bleeding.  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  He has not used NSAIDs.  Initial hemoglobin was 10.5 with a follow-up of 8.7.  CTA showed active extravasation into the colon at the junction of the descending colon and sigmoid colon as well as multiple pancreatic cystic lesions.    Patient was taken for mesenteric angiography on the morning of 4/27/2025 with no evidence of active extravasation of contrast.  Gastroenterology has been consulted and plans for endoscopy on 4/28/2025.    Plan:  1.  For GI bleeding: Appears to have stopped.  Monitor hemoglobin and hematocrit with transfusion as needed.  Monitor hemodynamics.  Hold antihypertensives.  2.  For respiratory: Reported history of COPD and 2 L baseline oxygen.  Continue oxygen as needed.  Bronchodilators/inhaled corticosteroids.  3.  For T2DM: Correction insulin for now.  Monitor and may need basal insulin eventually.  4.  For atrial fibrillation on Xarelto: Status post Kcentra secondary to active bleeding.  Hold anticoagulation.  5.  For pancreatic lesions on CT scan: Will need MRI pancreas protocol potentially as an outpatient for further evaluation.  6.  DVT prophylaxis: Mechanical.  7.  Keep in ICU given high risk for life-threatening decline in condition in setting of GI bleeding.      Patient is critically ill secondary to GI bleeding and has high risk of life-threatening decompensation in condition.   As such, the patient requires continuous monitoring and frequent reassessment for consideration of adjustment in management to minimize this risk.  I personally  reassessed the patient multiple times today.    Critical care time : 35 minutes spent by me personally and independently.(This excludes time spent performing separately reportable procedures and services).  Critical care time includes high complexity decision making to assess, manipulate, and support vital organ system failure in this individual who has impairment of one or more vital organ systems such that there is a high probability of imminent or life threatening deterioration in the patient’s condition.    Electronically signed by:    Montana Sy MD  04/27/25  08:59 EDT      *. Please note that portions of this note were completed with [employer] - a voice recognition program.

## 2025-04-27 NOTE — PLAN OF CARE
Goal Outcome Evaluation:              Outcome Evaluation: .       -patient A&O x4  -remains on 2L O2 nc  -Mag 2.3, hgb 7.5. 1 unit PRBCs transfused per order.   -patient is on clear liquid diet until midnight then NPO for colonoscopy scheduled for 4/28/25. Movi prep to be initiated once received from pharmacy.  -UOP adequate.  -continue to monitor plan of care.

## 2025-04-28 ENCOUNTER — ANESTHESIA (OUTPATIENT)
Dept: GASTROENTEROLOGY | Facility: HOSPITAL | Age: 75
End: 2025-04-28
Payer: MEDICARE

## 2025-04-28 ENCOUNTER — APPOINTMENT (OUTPATIENT)
Dept: INTERVENTIONAL RADIOLOGY/VASCULAR | Facility: HOSPITAL | Age: 75
End: 2025-04-28
Payer: MEDICARE

## 2025-04-28 ENCOUNTER — ANESTHESIA EVENT (OUTPATIENT)
Dept: GASTROENTEROLOGY | Facility: HOSPITAL | Age: 75
End: 2025-04-28
Payer: MEDICARE

## 2025-04-28 LAB
ANION GAP SERPL CALCULATED.3IONS-SCNC: 6 MMOL/L (ref 5–15)
BH BB BLOOD EXPIRATION DATE: NORMAL
BH BB BLOOD TYPE BARCODE: 9500
BH BB DISPENSE STATUS: NORMAL
BH BB PRODUCT CODE: NORMAL
BH BB UNIT NUMBER: NORMAL
BUN SERPL-MCNC: 14 MG/DL (ref 8–23)
BUN/CREAT SERPL: 18.7 (ref 7–25)
CALCIUM SPEC-SCNC: 7.7 MG/DL (ref 8.6–10.5)
CHLORIDE SERPL-SCNC: 104 MMOL/L (ref 98–107)
CO2 SERPL-SCNC: 29 MMOL/L (ref 22–29)
CREAT SERPL-MCNC: 0.75 MG/DL (ref 0.76–1.27)
CROSSMATCH INTERPRETATION: NORMAL
EGFRCR SERPLBLD CKD-EPI 2021: 93.5 ML/MIN/1.73
GLUCOSE BLDC GLUCOMTR-MCNC: 189 MG/DL (ref 70–130)
GLUCOSE BLDC GLUCOMTR-MCNC: 229 MG/DL (ref 70–130)
GLUCOSE BLDC GLUCOMTR-MCNC: 72 MG/DL (ref 70–130)
GLUCOSE BLDC GLUCOMTR-MCNC: 77 MG/DL (ref 70–130)
GLUCOSE BLDC GLUCOMTR-MCNC: 85 MG/DL (ref 70–130)
GLUCOSE BLDC GLUCOMTR-MCNC: 91 MG/DL (ref 70–130)
GLUCOSE SERPL-MCNC: 82 MG/DL (ref 65–99)
HCT VFR BLD AUTO: 24.1 % (ref 37.5–51)
HCT VFR BLD AUTO: 25.8 % (ref 37.5–51)
HCT VFR BLD AUTO: 27.1 % (ref 37.5–51)
HCT VFR BLD AUTO: 27.9 % (ref 37.5–51)
HGB BLD-MCNC: 7.4 G/DL (ref 13–17.7)
HGB BLD-MCNC: 8.1 G/DL (ref 13–17.7)
HGB BLD-MCNC: 8.4 G/DL (ref 13–17.7)
HGB BLD-MCNC: 8.4 G/DL (ref 13–17.7)
POTASSIUM SERPL-SCNC: 4.4 MMOL/L (ref 3.5–5.2)
SODIUM SERPL-SCNC: 139 MMOL/L (ref 136–145)
UNIT  ABO: NORMAL
UNIT  RH: NORMAL

## 2025-04-28 PROCEDURE — 63710000001 REVEFENACIN 175 MCG/3ML SOLUTION: Performed by: NURSE PRACTITIONER

## 2025-04-28 PROCEDURE — 25010000002 LIDOCAINE PF 1% 1 % SOLUTION

## 2025-04-28 PROCEDURE — 63710000001 INSULIN LISPRO (HUMAN) PER 5 UNITS: Performed by: INTERNAL MEDICINE

## 2025-04-28 PROCEDURE — 94799 UNLISTED PULMONARY SVC/PX: CPT

## 2025-04-28 PROCEDURE — 0DJD8ZZ INSPECTION OF LOWER INTESTINAL TRACT, VIA NATURAL OR ARTIFICIAL OPENING ENDOSCOPIC: ICD-10-PCS | Performed by: INTERNAL MEDICINE

## 2025-04-28 PROCEDURE — 85018 HEMOGLOBIN: CPT | Performed by: NURSE PRACTITIONER

## 2025-04-28 PROCEDURE — 25010000002 PROPOFOL 10 MG/ML EMULSION

## 2025-04-28 PROCEDURE — 80048 BASIC METABOLIC PNL TOTAL CA: CPT | Performed by: INTERNAL MEDICINE

## 2025-04-28 PROCEDURE — 45378 DIAGNOSTIC COLONOSCOPY: CPT | Performed by: INTERNAL MEDICINE

## 2025-04-28 PROCEDURE — 99233 SBSQ HOSP IP/OBS HIGH 50: CPT | Performed by: INTERNAL MEDICINE

## 2025-04-28 PROCEDURE — 85014 HEMATOCRIT: CPT | Performed by: NURSE PRACTITIONER

## 2025-04-28 PROCEDURE — 85014 HEMATOCRIT: CPT | Performed by: INTERNAL MEDICINE

## 2025-04-28 PROCEDURE — 94664 DEMO&/EVAL PT USE INHALER: CPT

## 2025-04-28 PROCEDURE — 82948 REAGENT STRIP/BLOOD GLUCOSE: CPT

## 2025-04-28 PROCEDURE — 85018 HEMOGLOBIN: CPT | Performed by: INTERNAL MEDICINE

## 2025-04-28 PROCEDURE — 63710000001 INSULIN REGULAR HUMAN PER 5 UNITS: Performed by: INTERNAL MEDICINE

## 2025-04-28 PROCEDURE — 25810000003 LACTATED RINGERS PER 1000 ML: Performed by: ANESTHESIOLOGY

## 2025-04-28 RX ORDER — FAMOTIDINE 20 MG/1
20 TABLET, FILM COATED ORAL ONCE
Status: DISCONTINUED | OUTPATIENT
Start: 2025-04-28 | End: 2025-04-28

## 2025-04-28 RX ORDER — FAMOTIDINE 10 MG/ML
20 INJECTION, SOLUTION INTRAVENOUS ONCE
Status: DISCONTINUED | OUTPATIENT
Start: 2025-04-28 | End: 2025-04-28

## 2025-04-28 RX ORDER — SODIUM CHLORIDE 0.9 % (FLUSH) 0.9 %
10 SYRINGE (ML) INJECTION EVERY 12 HOURS SCHEDULED
Status: DISCONTINUED | OUTPATIENT
Start: 2025-04-28 | End: 2025-04-28

## 2025-04-28 RX ORDER — LIDOCAINE HYDROCHLORIDE 10 MG/ML
0.5 INJECTION, SOLUTION EPIDURAL; INFILTRATION; INTRACAUDAL; PERINEURAL ONCE AS NEEDED
Status: DISCONTINUED | OUTPATIENT
Start: 2025-04-28 | End: 2025-04-28

## 2025-04-28 RX ORDER — BUPIVACAINE HCL/0.9 % NACL/PF 0.125 %
PLASTIC BAG, INJECTION (ML) EPIDURAL AS NEEDED
Status: DISCONTINUED | OUTPATIENT
Start: 2025-04-28 | End: 2025-04-28 | Stop reason: SURG

## 2025-04-28 RX ORDER — SODIUM CHLORIDE 0.9 % (FLUSH) 0.9 %
10 SYRINGE (ML) INJECTION AS NEEDED
Status: DISCONTINUED | OUTPATIENT
Start: 2025-04-28 | End: 2025-04-28

## 2025-04-28 RX ORDER — LIDOCAINE HYDROCHLORIDE 10 MG/ML
INJECTION, SOLUTION EPIDURAL; INFILTRATION; INTRACAUDAL; PERINEURAL AS NEEDED
Status: DISCONTINUED | OUTPATIENT
Start: 2025-04-28 | End: 2025-04-28 | Stop reason: SURG

## 2025-04-28 RX ORDER — PROPOFOL 10 MG/ML
VIAL (ML) INTRAVENOUS CONTINUOUS PRN
Status: DISCONTINUED | OUTPATIENT
Start: 2025-04-28 | End: 2025-04-28 | Stop reason: SURG

## 2025-04-28 RX ORDER — IPRATROPIUM BROMIDE AND ALBUTEROL SULFATE 2.5; .5 MG/3ML; MG/3ML
3 SOLUTION RESPIRATORY (INHALATION) ONCE AS NEEDED
Status: DISCONTINUED | OUTPATIENT
Start: 2025-04-28 | End: 2025-04-28

## 2025-04-28 RX ORDER — INSULIN LISPRO 100 [IU]/ML
2-9 INJECTION, SOLUTION INTRAVENOUS; SUBCUTANEOUS
Status: DISCONTINUED | OUTPATIENT
Start: 2025-04-28 | End: 2025-05-02 | Stop reason: HOSPADM

## 2025-04-28 RX ORDER — SODIUM CHLORIDE, SODIUM LACTATE, POTASSIUM CHLORIDE, CALCIUM CHLORIDE 600; 310; 30; 20 MG/100ML; MG/100ML; MG/100ML; MG/100ML
9 INJECTION, SOLUTION INTRAVENOUS CONTINUOUS
Status: ACTIVE | OUTPATIENT
Start: 2025-04-29 | End: 2025-04-29

## 2025-04-28 RX ORDER — MIDAZOLAM HYDROCHLORIDE 1 MG/ML
0.5 INJECTION, SOLUTION INTRAMUSCULAR; INTRAVENOUS
Status: DISCONTINUED | OUTPATIENT
Start: 2025-04-28 | End: 2025-04-28

## 2025-04-28 RX ORDER — ALBUTEROL SULFATE 0.83 MG/ML
2.5 SOLUTION RESPIRATORY (INHALATION) EVERY 4 HOURS PRN
Status: DISCONTINUED | OUTPATIENT
Start: 2025-04-28 | End: 2025-05-02 | Stop reason: HOSPADM

## 2025-04-28 RX ORDER — ONDANSETRON 2 MG/ML
4 INJECTION INTRAMUSCULAR; INTRAVENOUS ONCE AS NEEDED
Status: DISCONTINUED | OUTPATIENT
Start: 2025-04-28 | End: 2025-04-28

## 2025-04-28 RX ADMIN — Medication 100 MCG: at 13:37

## 2025-04-28 RX ADMIN — Medication 200 MCG: at 13:58

## 2025-04-28 RX ADMIN — BUDESONIDE AND FORMOTEROL FUMARATE DIHYDRATE 2 PUFF: 160; 4.5 AEROSOL RESPIRATORY (INHALATION) at 19:34

## 2025-04-28 RX ADMIN — ALBUTEROL SULFATE 2.5 MG: 2.5 SOLUTION RESPIRATORY (INHALATION) at 08:16

## 2025-04-28 RX ADMIN — BUDESONIDE AND FORMOTEROL FUMARATE DIHYDRATE 2 PUFF: 160; 4.5 AEROSOL RESPIRATORY (INHALATION) at 08:16

## 2025-04-28 RX ADMIN — Medication 200 MCG: at 13:30

## 2025-04-28 RX ADMIN — FINASTERIDE 5 MG: 5 TABLET, FILM COATED ORAL at 08:13

## 2025-04-28 RX ADMIN — MUPIROCIN 1 APPLICATION: 20 OINTMENT TOPICAL at 20:26

## 2025-04-28 RX ADMIN — AVOBENZONE, HOMOSALATE, OCTISALATE, OCTOCRYLENE, AND OXYBENZONE 1 PACKET: 29.4; 147; 49; 25.4; 58.8 LOTION TOPICAL at 16:18

## 2025-04-28 RX ADMIN — LIDOCAINE HYDROCHLORIDE 50 MG: 10 INJECTION, SOLUTION EPIDURAL; INFILTRATION; INTRACAUDAL; PERINEURAL at 13:23

## 2025-04-28 RX ADMIN — Medication 100 MCG: at 13:27

## 2025-04-28 RX ADMIN — TAMSULOSIN HYDROCHLORIDE 0.4 MG: 0.4 CAPSULE ORAL at 08:13

## 2025-04-28 RX ADMIN — PANTOPRAZOLE SODIUM 40 MG: 40 INJECTION, POWDER, FOR SOLUTION INTRAVENOUS at 08:13

## 2025-04-28 RX ADMIN — PANTOPRAZOLE SODIUM 40 MG: 40 INJECTION, POWDER, FOR SOLUTION INTRAVENOUS at 17:59

## 2025-04-28 RX ADMIN — MONTELUKAST 10 MG: 10 TABLET, FILM COATED ORAL at 20:26

## 2025-04-28 RX ADMIN — INSULIN HUMAN 2 UNITS: 100 INJECTION, SOLUTION PARENTERAL at 17:59

## 2025-04-28 RX ADMIN — Medication 200 MCG: at 13:45

## 2025-04-28 RX ADMIN — Medication 10 ML: at 08:13

## 2025-04-28 RX ADMIN — PROPOFOL INJECTABLE EMULSION 100 MCG/KG/MIN: 10 INJECTION, EMULSION INTRAVENOUS at 13:23

## 2025-04-28 RX ADMIN — PEG-3350, SODIUM SULFATE, SODIUM CHLORIDE, POTASSIUM CHLORIDE, SODIUM ASCORBATE AND ASCORBIC ACID 1000 ML: KIT at 04:08

## 2025-04-28 RX ADMIN — MUPIROCIN 1 APPLICATION: 20 OINTMENT TOPICAL at 08:13

## 2025-04-28 RX ADMIN — REVEFENACIN 175 MCG: 175 SOLUTION RESPIRATORY (INHALATION) at 08:16

## 2025-04-28 RX ADMIN — INSULIN LISPRO 4 UNITS: 100 INJECTION, SOLUTION INTRAVENOUS; SUBCUTANEOUS at 20:46

## 2025-04-28 RX ADMIN — SODIUM CHLORIDE, POTASSIUM CHLORIDE, SODIUM LACTATE AND CALCIUM CHLORIDE: 600; 310; 30; 20 INJECTION, SOLUTION INTRAVENOUS at 13:23

## 2025-04-28 RX ADMIN — Medication 10 ML: at 20:28

## 2025-04-28 NOTE — CASE MANAGEMENT/SOCIAL WORK
Discharge Planning Assessment  [facility]     Patient Name: Jonatan Mcguire  MRN: [id number]  Today's Date: 4/28/2025    Admit Date: 4/26/2025    Plan: Home   Discharge Needs Assessment       Row Name 04/28/25 1300       Living Environment    People in Home spouse    Current Living Arrangements home    Primary Care Provided by self    Provides Primary Care For no one    Family Caregiver if Needed spouse    Family Caregiver Names Bhavya Mcguire Spouse [phone]    Quality of Family Relationships supportive    Able to Return to Prior Arrangements yes       Resource/Environmental Concerns    Transportation Concerns none       Transition Planning    Patient/Family Anticipates Transition to home with family    Patient/Family Anticipated Services at Transition none    Transportation Anticipated family or friend will provide       Discharge Needs Assessment    Readmission Within the Last 30 Days no previous admission in last 30 days    Equipment Currently Used at Home walker, rolling;cane, straight;glucometer;cpap;oxygen                   Discharge Plan       Row Name 04/28/25 1303       Plan    Plan Home    Patient/Family in Agreement with Plan yes    Plan Comments Spoke with Mr. Mcguire and Spouse at the bedside. He lives with his Spouse in [place] [facility]. He is independent with ADL's and uses a walker, cane, glucometer and CPAP. He uses 2L oxygen as needed through [employer]. He does not use home health. He does not have advance directives filed. His preferred Pharmacy is [facility] on [address] [facility] in [place]. His PCP is Cheli Damico and he has Medicare A and B and [facility] insurance. Discharge plan is home. CM will continue to follow for discharge needs.    Final Discharge Disposition Code 01 - home or self-care                       Demographic Summary       Row Name 04/28/25 1258       General Information    Admission Type inpatient    Arrived From home    Referral Source admission list    Reason for  Consult discharge planning    Preferred Language English       Contact Information    Permission Granted to Share Info With                    Functional Status       Row Name 04/28/25 1300       Functional Status, IADL    Medications independent    Meal Preparation independent    Housekeeping independent    Laundry independent    Shopping independent       Mental Status    General Appearance WDL WDL                   Psychosocial    No documentation.                  Abuse/Neglect    No documentation.                  Legal    No documentation.                  Substance Abuse    No documentation.                  Patient Forms    No documentation.                     Abena Mcrae RN

## 2025-04-28 NOTE — PLAN OF CARE
Goal Outcome Evaluation:              Outcome Evaluation: VSS on 2LNC. Unable to wean to room air d/t sats dropping while patient asleep. Colonscopy completed today w/ no active bleed found per Dr. Mcclain. Q6 H&Hs continued and remain stable.  Diet resumed and patient tolerating well. Patient denies further needs @ this time.

## 2025-04-28 NOTE — ANESTHESIA POSTPROCEDURE EVALUATION
Patient: Jonatan Mcguire    Procedure Summary       Date: 04/28/25 Room / Location:  [facility]    Anesthesia Start: 1309 Anesthesia Stop: 1400    Procedure: COLONOSCOPY Diagnosis:       Gastrointestinal hemorrhage associated with anorectal source      Acute blood loss anemia      (Gastrointestinal hemorrhage associated with anorectal source [K62.5])      (Acute blood loss anemia [D62])    Surgeons: Beau Mcclain MD Provider: Warner Estrada MD    Anesthesia Type: general, MAC ASA Status: 3            Anesthesia Type: general, MAC    Vitals  No vitals data found for the desired time range.          Post Anesthesia Care and Evaluation    Patient location during evaluation: PACU  Patient participation: complete - patient participated  Level of consciousness: awake and alert  Pain management: adequate    Airway patency: patent  Anesthetic complications: No anesthetic complications  PONV Status: none  Cardiovascular status: hemodynamically stable, acceptable and blood pressure returned to baseline  Respiratory status: nonlabored ventilation, acceptable and nasal cannula  Hydration status: acceptable

## 2025-04-28 NOTE — PLAN OF CARE
Goal Outcome Evaluation:  Plan of Care Reviewed With: patient        Progress: no change     -VSS on 2L NC.  -Bowel prep continues. Pt having loose stool, still bloody.  -Adequate UOP for this shift.   -Plans for colonoscopy today.

## 2025-04-28 NOTE — ANESTHESIA PREPROCEDURE EVALUATION
Anesthesia Evaluation     Patient summary reviewed and Nursing notes reviewed   NPO Solid Status: > 8 hours  NPO Liquid Status: > 2 hours           Airway   Mallampati: II  TM distance: >3 FB  Neck ROM: full  No difficulty expected  Dental      Pulmonary    (+) COPD (MDI) moderate,home oxygen (2L NC), sleep apnea  (-) not a smoker  Cardiovascular     ECG reviewed  PT is on anticoagulation therapy  Patient on routine beta blocker    (+) hypertension, dysrhythmias Atrial Fib  (-) past MI    ROS comment: A Fib RBBB TW abn     Neuro/Psych  (-) CVA  GI/Hepatic/Renal/Endo    (+) obesity, GI bleeding lower , diabetes mellitus type 2 using insulin  (-) morbid obesity, no renal disease, no thyroid disorder    Musculoskeletal     Abdominal    Substance History      OB/GYN          Other        ROS/Med Hx Other: HCT 27----->29 creatnine OK   GI Bleed jxn descending and sigmoid colon seen on CTA                     Anesthesia Plan    ASA 3     general and MAC     (PFL )  intravenous induction     Anesthetic plan, risks, benefits, and alternatives have been provided, discussed and informed consent has been obtained with: patient.    Plan discussed with CRNA.        CODE STATUS:    Code Status (Patient has no pulse and is not breathing): CPR (Attempt to Resuscitate)  Medical Interventions (Patient has pulse or is breathing): Full Support

## 2025-04-28 NOTE — PROGRESS NOTES
INTENSIVIST   PROGRESS NOTE     Hospital:  LOS: 2 days     Mr. Jonatan Mcguire, 75 y.o. male is followed for a Chief Complaint of: Melena      Subjective   S     Interval History:  No acute events overnight. Awaiting colonoscopy today.        The patient's relevant past medical, surgical and social history were reviewed and updated in Epic as appropriate.      ROS:   Constitutional: Negative for fever.   Respiratory: Negative for dyspnea.   Cardiovascular: Negative for chest pain.   Gastrointestinal: Negative for  nausea, vomiting and diarrhea.     Objective   O     Vitals:  Temp  Min: 97.8 °F (36.6 °C)  Max: 99.2 °F (37.3 °C)  BP  Min: 97/51  Max: 124/72  Pulse  Min: 71  Max: 111  Resp  Min: 16  Max: 20  SpO2  Min: 90 %  Max: 100 % Flow (L/min) (Oxygen Therapy)  Min: 2  Max: 2    Intake/Ouptut 24 hrs (7:00AM - 6:59 AM)  Intake & Output (last 3 days)         04/25 0701  04/26 0700 04/26 0701  04/27 0700 04/27 0701  04/28 0700 04/28 0701  04/29 0700    I.V. (mL/kg)  784.7 (7.2) 1346.5 (12.4)     Blood  350 723.8     IV Piggyback  1000      Total Intake(mL/kg)  2134.7 (19.6) 2070.3 (19)     Urine (mL/kg/hr)  1210 1955 (0.7)     Stool  1620 0     Total Output  2830 1955     Net  -695.3 +115.3             Urine Unmeasured Occurrence   1 x     Stool Unmeasured Occurrence  3 x 11 x             Medications (drips):  [START ON 4/29/2025] lactated ringers        Physical Examination  Telemetry:  Normal sinus rhythm.    Constitutional:  No acute distress.  Resting in bed on nasal cannula.    Eyes: No scleral icterus.   PERRL, EOM intact.    Neck:  Supple, FROM   Cardiovascular: Normal rate, regular and rhythm. Normal heart sounds.  No murmurs, gallop or rub.   Respiratory: No respiratory distress. Normal respiratory effort.  Normal breath sounds  Clear to auscultation   Abdominal:  Soft. No masses. Nontender. No distension. No HSM.   Extremities: No digital cyanosis. No clubbing.  2+ lower extremity peripheral edema.   Skin: No  rashes, lesions or ulcers   Neurological:   Alert and interactive.              Results from last 7 days   Lab Units 04/28/25  0823 04/28/25  0207 04/27/25 2007 04/27/25  0955 04/27/25  0211 04/26/25  1905 04/26/25  1632   WBC 10*3/mm3  --   --   --   --  9.03  --  10.30   HEMOGLOBIN g/dL 8.4* 8.1* 9.0*   < > 7.9*   < > 10.5*   MCV fL  --   --   --   --  96.4  --  94.1   PLATELETS 10*3/mm3  --   --   --   --  140  --  173    < > = values in this interval not displayed.     Results from last 7 days   Lab Units 04/28/25  0207 04/27/25  1516 04/27/25  0211 04/26/25  1632   SODIUM mmol/L 139  --  140 141   POTASSIUM mmol/L 4.4  --  4.5 4.8   CO2 mmol/L 29.0  --  29.0 29.0   CREATININE mg/dL 0.75*  --  0.82 0.92   GLUCOSE mg/dL 82  --  84 166*   MAGNESIUM mg/dL  --  2.3 1.5*  --    PHOSPHORUS mg/dL  --   --  3.1  --      Estimated Creatinine Clearance: 105.2 mL/min (A) (by C-G formula based on SCr of 0.75 mg/dL (L)).  Results from last 7 days   Lab Units 04/26/25  1632   ALK PHOS U/L 45   BILIRUBIN mg/dL 0.5   ALT (SGPT) U/L 23   AST (SGOT) U/L 24             Images:  Imaging Results (Last 24 Hours)       ** No results found for the last 24 hours. **               Results: Reviewed.  I reviewed the patient's new laboratory and imaging results.  I independently reviewed the patient's new images.    Medications: Reviewed.    Assessment & Plan   A / P     Mr. Mcguire is a 74yo M with a history of COPD, chronic hypoxemic respiratory failure on 2L home O2, T2DM, HTN, NEW, and Afib on Xarelto who presented to [facility] on 4/26/25 with bright red blood per rectum.     Since admission, he has been transfused 3 units of pRBCs. GI is following. CTA showed active extravasation into the colon at the junction of the descending colon and sigmoid colon as well as multiple pancreatic cystic lesions.     Nutrition:   NPO Diet NPO Type: Strict NPO  Advance Directives:   Code Status and Medical Interventions: CPR (Attempt to Resuscitate); Full  Support   Ordered at: 04/26/25 2013     Code Status (Patient has no pulse and is not breathing):    CPR (Attempt to Resuscitate)     Medical Interventions (Patient has pulse or is breathing):    Full Support       Active Hospital Problems    Diagnosis     **GIB (gastrointestinal bleeding)     Chronic anticoagulation     Acute blood loss anemia     COPD (chronic obstructive pulmonary disease)     Afib     Diabetes mellitus, type II     Sleep apnea     Hypertension        Assessment / Plan:    Colonoscopy today per GI.   Continue to monitor H/H.   Anticoagulation for Afib on hold. S/p Kcentra secondary to active bleeding.   Continue SSI.   Will need an MRI pancreas protocol as an outpatient for further evaluation of pancreatic lesions seen on CTA.   Continue home 2L.   Mechanical DVT ppx.   AM labs    Remains high risk secondary to GI bleeding.     High level of risk due to:  severe exacerbation of chronic illness and illness with threat to life or bodily function.    Plan of care and goals reviewed during interdisciplinary rounds.  I discussed the patient's findings and my recommendations with patient, family, and nursing staff      Merline Jack, DO    Intensive Care Medicine and Pulmonary Medicine

## 2025-04-29 LAB
ANION GAP SERPL CALCULATED.3IONS-SCNC: 8 MMOL/L (ref 5–15)
BUN SERPL-MCNC: 13 MG/DL (ref 8–23)
BUN/CREAT SERPL: 14.8 (ref 7–25)
CALCIUM SPEC-SCNC: 7.5 MG/DL (ref 8.6–10.5)
CHLORIDE SERPL-SCNC: 102 MMOL/L (ref 98–107)
CO2 SERPL-SCNC: 29 MMOL/L (ref 22–29)
CREAT SERPL-MCNC: 0.88 MG/DL (ref 0.76–1.27)
DEPRECATED RDW RBC AUTO: 69.7 FL (ref 37–54)
EGFRCR SERPLBLD CKD-EPI 2021: 89.1 ML/MIN/1.73
ERYTHROCYTE [DISTWIDTH] IN BLOOD BY AUTOMATED COUNT: 20.3 % (ref 12.3–15.4)
GLUCOSE BLDC GLUCOMTR-MCNC: 121 MG/DL (ref 70–130)
GLUCOSE BLDC GLUCOMTR-MCNC: 141 MG/DL (ref 70–130)
GLUCOSE BLDC GLUCOMTR-MCNC: 185 MG/DL (ref 70–130)
GLUCOSE BLDC GLUCOMTR-MCNC: 81 MG/DL (ref 70–130)
GLUCOSE SERPL-MCNC: 80 MG/DL (ref 65–99)
HCT VFR BLD AUTO: 23 % (ref 37.5–51)
HCT VFR BLD AUTO: 23.5 % (ref 37.5–51)
HCT VFR BLD AUTO: 27.3 % (ref 37.5–51)
HCT VFR BLD AUTO: 27.9 % (ref 37.5–51)
HGB BLD-MCNC: 7.1 G/DL (ref 13–17.7)
HGB BLD-MCNC: 7.3 G/DL (ref 13–17.7)
HGB BLD-MCNC: 8.4 G/DL (ref 13–17.7)
HGB BLD-MCNC: 8.5 G/DL (ref 13–17.7)
MAGNESIUM SERPL-MCNC: 1.4 MG/DL (ref 1.6–2.4)
MCH RBC QN AUTO: 29.2 PG (ref 26.6–33)
MCHC RBC AUTO-ENTMCNC: 31.1 G/DL (ref 31.5–35.7)
MCV RBC AUTO: 94 FL (ref 79–97)
PHOSPHATE SERPL-MCNC: 4.1 MG/DL (ref 2.5–4.5)
PLATELET # BLD AUTO: 131 10*3/MM3 (ref 140–450)
PMV BLD AUTO: 10.2 FL (ref 6–12)
POTASSIUM SERPL-SCNC: 4.3 MMOL/L (ref 3.5–5.2)
RBC # BLD AUTO: 2.5 10*6/MM3 (ref 4.14–5.8)
SODIUM SERPL-SCNC: 139 MMOL/L (ref 136–145)
WBC NRBC COR # BLD AUTO: 10.35 10*3/MM3 (ref 3.4–10.8)

## 2025-04-29 PROCEDURE — 82948 REAGENT STRIP/BLOOD GLUCOSE: CPT

## 2025-04-29 PROCEDURE — 85018 HEMOGLOBIN: CPT | Performed by: INTERNAL MEDICINE

## 2025-04-29 PROCEDURE — 97162 PT EVAL MOD COMPLEX 30 MIN: CPT

## 2025-04-29 PROCEDURE — 85027 COMPLETE CBC AUTOMATED: CPT | Performed by: INTERNAL MEDICINE

## 2025-04-29 PROCEDURE — 63710000001 REVEFENACIN 175 MCG/3ML SOLUTION: Performed by: INTERNAL MEDICINE

## 2025-04-29 PROCEDURE — 84100 ASSAY OF PHOSPHORUS: CPT | Performed by: INTERNAL MEDICINE

## 2025-04-29 PROCEDURE — 99232 SBSQ HOSP IP/OBS MODERATE 35: CPT | Performed by: INTERNAL MEDICINE

## 2025-04-29 PROCEDURE — 94799 UNLISTED PULMONARY SVC/PX: CPT

## 2025-04-29 PROCEDURE — 25010000002 MAGNESIUM SULFATE 2 GM/50ML SOLUTION: Performed by: NURSE PRACTITIONER

## 2025-04-29 PROCEDURE — 83735 ASSAY OF MAGNESIUM: CPT | Performed by: INTERNAL MEDICINE

## 2025-04-29 PROCEDURE — 63710000001 INSULIN LISPRO (HUMAN) PER 5 UNITS: Performed by: INTERNAL MEDICINE

## 2025-04-29 PROCEDURE — 80048 BASIC METABOLIC PNL TOTAL CA: CPT | Performed by: INTERNAL MEDICINE

## 2025-04-29 PROCEDURE — 85014 HEMATOCRIT: CPT | Performed by: INTERNAL MEDICINE

## 2025-04-29 PROCEDURE — 94761 N-INVAS EAR/PLS OXIMETRY MLT: CPT

## 2025-04-29 PROCEDURE — 94664 DEMO&/EVAL PT USE INHALER: CPT

## 2025-04-29 RX ORDER — MAGNESIUM SULFATE HEPTAHYDRATE 40 MG/ML
2 INJECTION, SOLUTION INTRAVENOUS
Status: COMPLETED | OUTPATIENT
Start: 2025-04-29 | End: 2025-04-29

## 2025-04-29 RX ORDER — CETIRIZINE HYDROCHLORIDE 10 MG/1
10 TABLET ORAL DAILY
Status: DISCONTINUED | OUTPATIENT
Start: 2025-04-29 | End: 2025-05-02 | Stop reason: HOSPADM

## 2025-04-29 RX ADMIN — INSULIN LISPRO 2 UNITS: 100 INJECTION, SOLUTION INTRAVENOUS; SUBCUTANEOUS at 21:04

## 2025-04-29 RX ADMIN — BUDESONIDE AND FORMOTEROL FUMARATE DIHYDRATE 2 PUFF: 160; 4.5 AEROSOL RESPIRATORY (INHALATION) at 11:14

## 2025-04-29 RX ADMIN — MAGNESIUM SULFATE HEPTAHYDRATE 2 G: 40 INJECTION, SOLUTION INTRAVENOUS at 05:29

## 2025-04-29 RX ADMIN — REVEFENACIN 175 MCG: 175 SOLUTION RESPIRATORY (INHALATION) at 11:13

## 2025-04-29 RX ADMIN — SENNOSIDES AND DOCUSATE SODIUM 2 TABLET: 50; 8.6 TABLET ORAL at 08:38

## 2025-04-29 RX ADMIN — AVOBENZONE, HOMOSALATE, OCTISALATE, OCTOCRYLENE, AND OXYBENZONE 1 PACKET: 29.4; 147; 49; 25.4; 58.8 LOTION TOPICAL at 08:38

## 2025-04-29 RX ADMIN — PANTOPRAZOLE SODIUM 40 MG: 40 INJECTION, POWDER, FOR SOLUTION INTRAVENOUS at 16:30

## 2025-04-29 RX ADMIN — MUPIROCIN 1 APPLICATION: 20 OINTMENT TOPICAL at 08:38

## 2025-04-29 RX ADMIN — ACETAMINOPHEN 650 MG: 325 TABLET, FILM COATED ORAL at 21:32

## 2025-04-29 RX ADMIN — BUDESONIDE AND FORMOTEROL FUMARATE DIHYDRATE 2 PUFF: 160; 4.5 AEROSOL RESPIRATORY (INHALATION) at 19:14

## 2025-04-29 RX ADMIN — MUPIROCIN 1 APPLICATION: 20 OINTMENT TOPICAL at 21:08

## 2025-04-29 RX ADMIN — MAGNESIUM SULFATE HEPTAHYDRATE 2 G: 40 INJECTION, SOLUTION INTRAVENOUS at 08:38

## 2025-04-29 RX ADMIN — Medication 10 ML: at 21:27

## 2025-04-29 RX ADMIN — CETIRIZINE HYDROCHLORIDE 10 MG: 10 TABLET, FILM COATED ORAL at 16:23

## 2025-04-29 RX ADMIN — TAMSULOSIN HYDROCHLORIDE 0.4 MG: 0.4 CAPSULE ORAL at 08:38

## 2025-04-29 RX ADMIN — FINASTERIDE 5 MG: 5 TABLET, FILM COATED ORAL at 08:38

## 2025-04-29 RX ADMIN — PANTOPRAZOLE SODIUM 40 MG: 40 INJECTION, POWDER, FOR SOLUTION INTRAVENOUS at 08:38

## 2025-04-29 RX ADMIN — ALBUTEROL SULFATE 2.5 MG: 2.5 SOLUTION RESPIRATORY (INHALATION) at 11:13

## 2025-04-29 RX ADMIN — Medication 10 ML: at 08:48

## 2025-04-29 RX ADMIN — MAGNESIUM SULFATE HEPTAHYDRATE 2 G: 40 INJECTION, SOLUTION INTRAVENOUS at 10:48

## 2025-04-29 RX ADMIN — MONTELUKAST 10 MG: 10 TABLET, FILM COATED ORAL at 21:05

## 2025-04-29 RX ADMIN — ACETAMINOPHEN 650 MG: 325 TABLET, FILM COATED ORAL at 05:30

## 2025-04-29 NOTE — PLAN OF CARE
Goal Outcome Evaluation:      -AOx4, VSS. Up to chair with PT x1 and walker.. No complaints of pain. Ordered to tele.      Progress: improving

## 2025-04-29 NOTE — PLAN OF CARE
Goal Outcome Evaluation:  Plan of Care Reviewed With: patient        Progress: no change  Outcome Evaluation: PT eval complete. Pt presents with generalized weakness, decreased activity tolerance, and is performing below PLOF warranting IPPT services. Pt ambulated w/ CGA and FWW, but was limited by fatigue and JOHNSON. PT rec home w/ assist and HHPT at d/c.    Anticipated Discharge Disposition (PT): home with assist, home with home health

## 2025-04-29 NOTE — CASE MANAGEMENT/SOCIAL WORK
Continued Stay Note  [facility]     Patient Name: Jonatan Mcguire  MRN: [id number]  Today's Date: 4/29/2025    Admit Date: 4/26/2025    Plan: Home   Discharge Plan       Row Name 04/29/25 1408       Plan    Plan Home    Patient/Family in Agreement with Plan yes    Plan Comments Spoke with Mr. Mcguire at the bedside. Therapy is recommending home with home health PT. Discussed with patient and he declined. He had home health previously and does not want it again. Discharge plan is home. CM will continue to follow up.    Final Discharge Disposition Code 01 - home or self-care                   Discharge Codes    No documentation.                       Abena Mcrae RN

## 2025-04-29 NOTE — PLAN OF CARE
Goal Outcome Evaluation:            Outcome Evaluation: VSS. Remains on 2L nasal cannula. No BM this shift. H&H trending down, no signs of active bleeding. Tolerating PO intake. No complaints of pain.

## 2025-04-29 NOTE — THERAPY EVALUATION
Patient Name: Jonatan Mcguire  : 1950    MRN: [id number]                              Today's Date: 2025       Admit Date: 2025    Visit Dx:     ICD-10-CM ICD-9-CM   1. Gastrointestinal hemorrhage associated with anorectal source  K62.5 569.3   2. Acute blood loss anemia  D62 285.1     Patient Active Problem List   Diagnosis    Hyperkalemia    GIB (gastrointestinal bleeding)    COPD (chronic obstructive pulmonary disease)    Afib    Chronic anticoagulation    Diabetes mellitus, type II    Sleep apnea    Hypertension    Acute blood loss anemia     Past Medical History:   Diagnosis Date    Afib     COPD (chronic obstructive pulmonary disease)     Diabetes mellitus, type II     Hypertension     Obesity     Sleep apnea     Vitamin D deficiency      Past Surgical History:   Procedure Laterality Date    COLON RESECTION      COLONOSCOPY N/A 2025    Procedure: COLONOSCOPY;  Surgeon: Beau Mcclain MD;  Location: [facility] ENDOSCOPY;  Service: Gastroenterology;  Laterality: N/A;    NASAL POLYP EXCISION        General Information       Row Name 25 1137          Physical Therapy Time and Intention    Document Type evaluation  -ES     Mode of Treatment physical therapy  -ES       Row Name 25 1137          General Information    Patient Profile Reviewed yes  -ES     Prior Level of Function independent:;all household mobility;community mobility;transfer;bed mobility;ADL's  FWW, no recent falls  -ES     Existing Precautions/Restrictions oxygen therapy device and L/min  -ES     Barriers to Rehab medically complex;previous functional deficit  -ES       Row Name 25 1137          Living Environment    Current Living Arrangements home  -ES     People in Home spouse  -ES       Row Name 25 1137          Home Main Entrance    Number of Stairs, Main Entrance five  -ES     Stair Railings, Main Entrance railings on both sides of stairs  -ES       Row Name 25 1137          Stairs Within Home,  Primary    Number of Stairs, Within Home, Primary none  -ES       Row Name 04/29/25 1137          Cognition    Orientation Status (Cognition) oriented x 3  -ES       Row Name 04/29/25 1137          Safety Issues/Impairments Affecting Functional Mobility    Safety Issues Affecting Function (Mobility) awareness of need for assistance;insight into deficits/self-awareness;safety precaution awareness;safety precautions follow-through/compliance  -ES     Impairments Affecting Function (Mobility) balance;endurance/activity tolerance;shortness of breath;strength  -ES               User Key  (r) = Recorded By, (t) = Taken By, (c) = Cosigned By      Initials Name Provider Type    ES Malika Funk PT Physical Therapist                   Mobility       Row Name 04/29/25 1138          Bed Mobility    Bed Mobility supine-sit  -ES     Supine-Sit [place] (Bed Mobility) contact guard  -ES     Assistive Device (Bed Mobility) bed rails;head of bed elevated  -ES     Comment, (Bed Mobility) denied dizziness w/ transition to sitting  -ES       Row Name 04/29/25 1138          Sit-Stand Transfer    Sit-Stand [place] (Transfers) contact guard  -ES     Assistive Device (Sit-Stand Transfers) walker, front-wheeled  -ES     Comment, (Sit-Stand Transfer) Able to maintain static standing ~2mins to use urinal w/ CGA and FWW  -ES       Row Name 04/29/25 1138          Gait/Stairs (Locomotion)    [place] Level (Gait) contact guard  -ES     Assistive Device (Gait) walker, front-wheeled  -ES     Patient was able to Ambulate yes  -ES     Distance in Feet (Gait) 125  -ES     Deviations/Abnormal Patterns (Gait) bilateral deviations;gait speed decreased;stride length decreased  -ES     Bilateral Gait Deviations forward flexed posture  -ES     Comment, (Gait/Stairs) Pt amb w/ CGA and FWW. Demo'd forward flexed posture with decreased stride length and step-through gait pattern. Required 3x standing rest breaks due to fatigue/JOHNSON, SpO2  remained >90% on 2LNC.  -ES               User Key  (r) = Recorded By, (t) = Taken By, (c) = Cosigned By      Initials Name Provider Type    ES Malika Funk PT Physical Therapist                   Obj/Interventions       Row Name 04/29/25 1139          Range of Motion Comprehensive    General Range of Motion bilateral lower extremity ROM WFL  -ES       Row Name 04/29/25 1139          Strength Comprehensive (MMT)    General Manual Muscle Testing (MMT) Assessment lower extremity strength deficits identified  -ES     Comment, General Manual Muscle Testing (MMT) Assessment BLE grossly 4/5  -ES       Row Name 04/29/25 1139          Balance    Balance Assessment sitting static balance;standing dynamic balance;sitting dynamic balance;standing static balance  -ES     Static Sitting Balance standby assist  -ES     Dynamic Sitting Balance contact guard  -ES     Position, Sitting Balance unsupported;sitting in chair;sitting edge of bed  -ES     Static Standing Balance contact guard  -ES     Dynamic Standing Balance contact guard  -ES     Position/Device Used, Standing Balance supported;walker, front-wheeled  -ES     Balance Interventions sitting;standing;supported;sit to stand;static;dynamic;occupation based/functional task  -ES       Row Name 04/29/25 1139          Sensory Assessment (Somatosensory)    Sensory Assessment (Somatosensory) LE sensation intact  -ES               User Key  (r) = Recorded By, (t) = Taken By, (c) = Cosigned By      Initials Name Provider Type    ES Malika Funk PT Physical Therapist                   Goals/Plan       Row Name 04/29/25 1141          Bed Mobility Goal 1 (PT)    Activity/Assistive Device (Bed Mobility Goal 1, PT) sit to supine/supine to sit  -ES     [place] Level/Cues Needed (Bed Mobility Goal 1, PT) standby assist  -ES     Time Frame (Bed Mobility Goal 1, PT) short term goal (STG);5 days  -ES       Row Name 04/29/25 1141          Transfer Goal 1 (PT)     Activity/Assistive Device (Transfer Goal 1, PT) sit-to-stand/stand-to-sit;bed-to-chair/chair-to-bed;walker, rolling  -ES     [place] Level/Cues Needed (Transfer Goal 1, PT) standby assist  -ES     Time Frame (Transfer Goal 1, PT) long term goal (LTG);10 days  -ES       Row Name 04/29/25 1141          Gait Training Goal 1 (PT)    Activity/Assistive Device (Gait Training Goal 1, PT) gait (walking locomotion);assistive device use;decrease fall risk;increase endurance/gait distance  -ES     [place] Level (Gait Training Goal 1, PT) standby assist  -ES     Distance (Gait Training Goal 1, PT) 150  -ES     Time Frame (Gait Training Goal 1, PT) long term goal (LTG);10 days  -ES       Row Name 04/29/25 1141          Stairs Goal 1 (PT)    Activity/Assistive Device (Stairs Goal 1, PT) ascending stairs;descending stairs;using handrail, left;using handrail, right  -ES     [place] Level/Cues Needed (Stairs Goal 1, PT) standby assist  -ES     Number of Stairs (Stairs Goal 1, PT) 3  -ES     Time Frame (Stairs Goal 1, PT) long term goal (LTG);10 days  -ES       Row Name 04/29/25 1141          Therapy Assessment/Plan (PT)    Planned Therapy Interventions (PT) balance training;bed mobility training;gait training;home exercise program;neuromuscular re-education;patient/family education;strengthening;stair training;transfer training;postural re-education  -ES               User Key  (r) = Recorded By, (t) = Taken By, (c) = Cosigned By      Initials Name Provider Type    ES Malika Funk, PT Physical Therapist                   Clinical Impression       Row Name 04/29/25 1140          Pain    Pretreatment Pain Rating 0/10 - no pain  -ES     Posttreatment Pain Rating 0/10 - no pain  -ES     Pre/Posttreatment Pain Comment tolerated  -ES       Row Name 04/29/25 1140          Plan of Care Review    Plan of Care Reviewed With patient  -ES     Progress no change  -ES     Outcome Evaluation PT eval complete. Pt presents with  generalized weakness, decreased activity tolerance, and is performing below PLOF warranting IPPT services. Pt ambulated w/ CGA and FWW, but was limited by fatigue and JOHNSON. PT rec home w/ assist and HHPT at d/c.  -ES       Row Name 04/29/25 1140          Therapy Assessment/Plan (PT)    Rehab Potential (PT) good  -ES     Criteria for Skilled Interventions Met (PT) yes;skilled treatment is necessary;meets criteria  -ES     Therapy Frequency (PT) daily  -ES     Predicted Duration of Therapy Intervention (PT) 10 days  -ES       Row Name 04/29/25 1140          Vital Signs    Pre Systolic BP Rehab 107  -ES     Pre Treatment Diastolic BP 67  -ES     Post Systolic BP Rehab 131  -ES     Post Treatment Diastolic BP 68  -ES     Pretreatment Heart Rate (beats/min) 82  -ES     Posttreatment Heart Rate (beats/min) 99  -ES     Pre SpO2 (%) 99  -ES     O2 Delivery Pre Treatment nasal cannula  -ES     Intra SpO2 (%) 92  -ES     O2 Delivery Intra Treatment nasal cannula  -ES     Post SpO2 (%) 98  -ES     O2 Delivery Post Treatment nasal cannula  -ES     Pre Patient Position Supine  -ES     Intra Patient Position Standing  -ES     Post Patient Position Sitting  -ES       Row Name 04/29/25 1140          Positioning and Restraints    Pre-Treatment Position in bed  -ES     Post Treatment Position chair  -ES     In Chair notified nsg;reclined;sitting;call light within reach;encouraged to call for assist;exit alarm on;waffle cushion;legs elevated;heels elevated  -ES               User Key  (r) = Recorded By, (t) = Taken By, (c) = Cosigned By      Initials Name Provider Type    ES Malika Funk, PT Physical Therapist                   Outcome Measures       Row Name 04/29/25 1142          How much help from another person do you currently need...    Turning from your back to your side while in flat bed without using bedrails? 3  -ES     Moving from lying on back to sitting on the side of a flat bed without bedrails? 3  -ES     Moving to  and from a bed to a chair (including a wheelchair)? 3  -ES     Standing up from a chair using your arms (e.g., wheelchair, bedside chair)? 3  -ES     Climbing 3-5 steps with a railing? 3  -ES     To walk in hospital room? 3  -ES     AM-PAC 6 Clicks Score (PT) 18  -ES     Highest Level of Mobility Goal 6 --> Walk 10 steps or more  -ES       Row Name 04/29/25 1142          Functional Assessment    Outcome Measure Options AM-PAC 6 Clicks Basic Mobility (PT)  -ES               User Key  (r) = Recorded By, (t) = Taken By, (c) = Cosigned By      Initials Name Provider Type    ES Malika Funk PT Physical Therapist                                 Physical Therapy Education       Title: PT OT SLP Therapies (In Progress)       Topic: Physical Therapy (In Progress)       Point: Mobility training (Done)       Learning Progress Summary            Patient Acceptance, E,TB, VU by ES at 4/29/2025 1142                      Point: Home exercise program (Not Started)       Learner Progress:  Not documented in this visit.              Point: Body mechanics (Done)       Learning Progress Summary            Patient Acceptance, E,TB, VU by ES at 4/29/2025 1142                      Point: Precautions (Done)       Learning Progress Summary            Patient Acceptance, E,TB, VU by ES at 4/29/2025 1142                                      User Key       Initials Effective Dates Name Provider Type Discipline     08/11/22 -  Malika Funk PT Physical Therapist PT                  PT Recommendation and Plan  Planned Therapy Interventions (PT): balance training, bed mobility training, gait training, home exercise program, neuromuscular re-education, patient/family education, strengthening, stair training, transfer training, postural re-education  Progress: no change  Outcome Evaluation: PT eval complete. Pt presents with generalized weakness, decreased activity tolerance, and is performing below PLOF warranting IPPT services. Pt  ambulated w/ CGA and FWW, but was limited by fatigue and JOHNSON. PT rec home w/ assist and HHPT at d/c.     Time Calculation:   PT Evaluation Complexity  History, PT Evaluation Complexity: 1-2 personal factors and/or comorbidities  Examination of Body Systems (PT Eval Complexity): total of 3 or more elements  Clinical Presentation (PT Evaluation Complexity): evolving  Clinical Decision Making (PT Evaluation Complexity): moderate complexity  Overall Complexity (PT Evaluation Complexity): moderate complexity     PT Charges       Row Name 04/29/25 1142             Time Calculation    Start Time 1021  -ES      PT Received On 04/29/25  -ES      PT Goal Re-Cert Due Date 05/09/25  -ES         Untimed Charges    PT Eval/Re-eval Minutes 49  -ES         Total Minutes    Untimed Charges Total Minutes 49  -ES       Total Minutes 49  -ES                User Key  (r) = Recorded By, (t) = Taken By, (c) = Cosigned By      Initials Name Provider Type    ES Malika Funk, PT Physical Therapist                  Therapy Charges for Today       Code Description Service Date Service Provider Modifiers Qty    [id number]  PT EVAL MOD COMPLEXITY 4 4/29/2025 Malika Funk, PT GP 1            PT G-Codes  Outcome Measure Options: AM-PAC 6 Clicks Basic Mobility (PT)  AM-PAC 6 Clicks Score (PT): 18  PT Discharge Summary  Anticipated Discharge Disposition (PT): home with assist, home with home health    Malika Funk PT  4/29/2025

## 2025-04-29 NOTE — PROGRESS NOTES
INTENSIVIST   PROGRESS NOTE     Hospital:  LOS: 3 days     Mr. Jonatan Mcguire, 75 y.o. male is followed for a Chief Complaint of: Melena      Subjective   S     Interval History:  No acute events overnight. Colonoscopy yesterday.        The patient's relevant past medical, surgical and social history were reviewed and updated in Epic as appropriate.      ROS:   Constitutional: Negative for fever.   Respiratory: Negative for dyspnea.   Cardiovascular: Negative for chest pain.   Gastrointestinal: Negative for  nausea, vomiting and diarrhea.     Objective   O     Vitals:  Temp  Min: 97.8 °F (36.6 °C)  Max: 98.5 °F (36.9 °C)  BP  Min: 89/53  Max: 119/59  Pulse  Min: 75  Max: 102  Resp  Min: 16  Max: 18  SpO2  Min: 92 %  Max: 100 % Flow (L/min) (Oxygen Therapy)  Min: 2  Max: 2    Intake/Ouptut 24 hrs (7:00AM - 6:59 AM)  Intake & Output (last 3 days)         04/25 0701  04/26 0700 04/26 0701  04/27 0700 04/27 0701  04/28 0700 04/28 0701  04/29 0700    I.V. (mL/kg)  784.7 (7.2) 1346.5 (12.4)     Blood  350 723.8     IV Piggyback  1000      Total Intake(mL/kg)  2134.7 (19.6) 2070.3 (19)     Urine (mL/kg/hr)  1210 1955 (0.7)     Stool  1620 0     Total Output  2830 1955     Net  -695.3 +115.3             Urine Unmeasured Occurrence   1 x     Stool Unmeasured Occurrence  3 x 11 x             Medications (drips):  lactated ringers        Physical Examination  Telemetry:  Normal sinus rhythm.    Constitutional:  No acute distress.  Resting in bed on nasal cannula.    Eyes: No scleral icterus.   PERRL, EOM intact.    Neck:  Supple, FROM   Cardiovascular: Normal rate, regular and rhythm. Normal heart sounds.  No murmurs, gallop or rub.   Respiratory: No respiratory distress. Normal respiratory effort.  Normal breath sounds  Clear to auscultation   Abdominal:  Soft. No masses. Nontender. No distension. No HSM.   Extremities: No digital cyanosis. No clubbing.  2+ lower extremity peripheral edema.   Skin: No rashes, lesions or ulcers    Neurological:   Arouses and is interactive.              Results from last 7 days   Lab Units 04/29/25  0309 04/28/25  2046 04/28/25  1533 04/27/25  0955 04/27/25  0211 04/26/25  1905 04/26/25  1632   WBC 10*3/mm3 10.35  --   --   --  9.03  --  10.30   HEMOGLOBIN g/dL 7.3* 7.4* 8.4*   < > 7.9*   < > 10.5*   MCV fL 94.0  --   --   --  96.4  --  94.1   PLATELETS 10*3/mm3 131*  --   --   --  140  --  173    < > = values in this interval not displayed.     Results from last 7 days   Lab Units 04/29/25  0309 04/28/25  0207 04/27/25  1516 04/27/25  0211   SODIUM mmol/L 139 139  --  140   POTASSIUM mmol/L 4.3 4.4  --  4.5   CO2 mmol/L 29.0 29.0  --  29.0   CREATININE mg/dL 0.88 0.75*  --  0.82   GLUCOSE mg/dL 80 82  --  84   MAGNESIUM mg/dL 1.4*  --  2.3 1.5*   PHOSPHORUS mg/dL 4.1  --   --  3.1     Estimated Creatinine Clearance: 89.7 mL/min (by C-G formula based on SCr of 0.88 mg/dL).  Results from last 7 days   Lab Units 04/26/25  1632   ALK PHOS U/L 45   BILIRUBIN mg/dL 0.5   ALT (SGPT) U/L 23   AST (SGOT) U/L 24             Images:  Imaging Results (Last 24 Hours)       ** No results found for the last 24 hours. **               Results: Reviewed.  I reviewed the patient's new laboratory and imaging results.  I independently reviewed the patient's new images.    Medications: Reviewed.    Assessment & Plan   A / P     Mr. Mcguire is a 76yo M with a history of COPD, chronic hypoxemic respiratory failure on 2L home O2, T2DM, HTN, NEW, and Afib on Xarelto who presented to [facility] on 4/26/25 with bright red blood per rectum.     Since admission, he has been transfused 3 units of pRBCs. GI is following. CTA showed active extravasation into the colon at the junction of the descending colon and sigmoid colon as well as multiple pancreatic cystic lesions.     Colonoscopy on 4/28 with no active bleeding. Diverticuli present and previous bleeding presumed to be diverticular in nature.     Nutrition:   Diet: Cardiac, Diabetic; Healthy  Heart (2-3 Na+); Consistent Carbohydrate; Fluid Consistency: Thin (IDDSI 0)  Advance Directives:   Code Status and Medical Interventions: CPR (Attempt to Resuscitate); Full Support   Ordered at: 04/26/25 2013     Code Status (Patient has no pulse and is not breathing):    CPR (Attempt to Resuscitate)     Medical Interventions (Patient has pulse or is breathing):    Full Support       Active Hospital Problems    Diagnosis     **GIB (gastrointestinal bleeding)     Chronic anticoagulation     Acute blood loss anemia     COPD (chronic obstructive pulmonary disease)     Afib     Diabetes mellitus, type II     Sleep apnea     Hypertension        Assessment / Plan:    Continue to monitor H/H. Decrease frequency to every 8 hours.   Anticoagulation for Afib on hold. Hold at least another 48hrs from today per GI. S/p Kcentra secondary to active bleeding.   Continue SSI.   Will need an MRI pancreas protocol as an outpatient for further evaluation of pancreatic lesions seen on CTA.   Continue home 2L.   Mechanical DVT ppx.   PT/OT  Replace magnesium  AM labs  Okay to transfer to telemetry.       High level of risk due to:  severe exacerbation of chronic illness and illness with threat to life or bodily function.    Plan of care and goals reviewed during interdisciplinary rounds.  I discussed the patient's findings and my recommendations with patient and nursing staff      Merline Jack, DO    Intensive Care Medicine and Pulmonary Medicine

## 2025-04-30 LAB
ANION GAP SERPL CALCULATED.3IONS-SCNC: 9 MMOL/L (ref 5–15)
BASOPHILS # BLD AUTO: 0.01 10*3/MM3 (ref 0–0.2)
BASOPHILS NFR BLD AUTO: 0.1 % (ref 0–1.5)
BH BB BLOOD EXPIRATION DATE: NORMAL
BH BB BLOOD TYPE BARCODE: 9500
BH BB DISPENSE STATUS: NORMAL
BH BB PRODUCT CODE: NORMAL
BH BB UNIT NUMBER: NORMAL
BUN SERPL-MCNC: 8 MG/DL (ref 8–23)
BUN/CREAT SERPL: 11.9 (ref 7–25)
CALCIUM SPEC-SCNC: 7.8 MG/DL (ref 8.6–10.5)
CHLORIDE SERPL-SCNC: 99 MMOL/L (ref 98–107)
CO2 SERPL-SCNC: 31 MMOL/L (ref 22–29)
CREAT SERPL-MCNC: 0.67 MG/DL (ref 0.76–1.27)
CROSSMATCH INTERPRETATION: NORMAL
DEPRECATED RDW RBC AUTO: 68.1 FL (ref 37–54)
EGFRCR SERPLBLD CKD-EPI 2021: 96.8 ML/MIN/1.73
EOSINOPHIL # BLD AUTO: 0.12 10*3/MM3 (ref 0–0.4)
EOSINOPHIL NFR BLD AUTO: 1.2 % (ref 0.3–6.2)
ERYTHROCYTE [DISTWIDTH] IN BLOOD BY AUTOMATED COUNT: 19.9 % (ref 12.3–15.4)
GLUCOSE BLDC GLUCOMTR-MCNC: 138 MG/DL (ref 70–130)
GLUCOSE BLDC GLUCOMTR-MCNC: 152 MG/DL (ref 70–130)
GLUCOSE BLDC GLUCOMTR-MCNC: 192 MG/DL (ref 70–130)
GLUCOSE BLDC GLUCOMTR-MCNC: 97 MG/DL (ref 70–130)
GLUCOSE SERPL-MCNC: 83 MG/DL (ref 65–99)
HCT VFR BLD AUTO: 23.4 % (ref 37.5–51)
HCT VFR BLD AUTO: 27.1 % (ref 37.5–51)
HGB BLD-MCNC: 7.2 G/DL (ref 13–17.7)
HGB BLD-MCNC: 8.3 G/DL (ref 13–17.7)
IMM GRANULOCYTES # BLD AUTO: 0.03 10*3/MM3 (ref 0–0.05)
IMM GRANULOCYTES NFR BLD AUTO: 0.3 % (ref 0–0.5)
LYMPHOCYTES # BLD AUTO: 4.39 10*3/MM3 (ref 0.7–3.1)
LYMPHOCYTES NFR BLD AUTO: 45.4 % (ref 19.6–45.3)
MAGNESIUM SERPL-MCNC: 1.7 MG/DL (ref 1.6–2.4)
MCH RBC QN AUTO: 29.4 PG (ref 26.6–33)
MCHC RBC AUTO-ENTMCNC: 30.8 G/DL (ref 31.5–35.7)
MCV RBC AUTO: 95.5 FL (ref 79–97)
MONOCYTES # BLD AUTO: 1.21 10*3/MM3 (ref 0.1–0.9)
MONOCYTES NFR BLD AUTO: 12.5 % (ref 5–12)
NEUTROPHILS NFR BLD AUTO: 3.91 10*3/MM3 (ref 1.7–7)
NEUTROPHILS NFR BLD AUTO: 40.5 % (ref 42.7–76)
NRBC BLD AUTO-RTO: 0 /100 WBC (ref 0–0.2)
PHOSPHATE SERPL-MCNC: 3.4 MG/DL (ref 2.5–4.5)
PLATELET # BLD AUTO: 143 10*3/MM3 (ref 140–450)
PMV BLD AUTO: 9.3 FL (ref 6–12)
POTASSIUM SERPL-SCNC: 4.3 MMOL/L (ref 3.5–5.2)
RBC # BLD AUTO: 2.45 10*6/MM3 (ref 4.14–5.8)
SODIUM SERPL-SCNC: 139 MMOL/L (ref 136–145)
UNIT  ABO: NORMAL
UNIT  RH: NORMAL
WBC NRBC COR # BLD AUTO: 9.67 10*3/MM3 (ref 3.4–10.8)

## 2025-04-30 PROCEDURE — 25010000002 MAGNESIUM SULFATE 4 GM/100ML SOLUTION: Performed by: INTERNAL MEDICINE

## 2025-04-30 PROCEDURE — 80048 BASIC METABOLIC PNL TOTAL CA: CPT | Performed by: INTERNAL MEDICINE

## 2025-04-30 PROCEDURE — 94799 UNLISTED PULMONARY SVC/PX: CPT

## 2025-04-30 PROCEDURE — 84100 ASSAY OF PHOSPHORUS: CPT | Performed by: INTERNAL MEDICINE

## 2025-04-30 PROCEDURE — 85025 COMPLETE CBC W/AUTO DIFF WBC: CPT | Performed by: INTERNAL MEDICINE

## 2025-04-30 PROCEDURE — 85018 HEMOGLOBIN: CPT | Performed by: INTERNAL MEDICINE

## 2025-04-30 PROCEDURE — 63710000001 INSULIN LISPRO (HUMAN) PER 5 UNITS: Performed by: INTERNAL MEDICINE

## 2025-04-30 PROCEDURE — 85014 HEMATOCRIT: CPT | Performed by: INTERNAL MEDICINE

## 2025-04-30 PROCEDURE — 97164 PT RE-EVAL EST PLAN CARE: CPT

## 2025-04-30 PROCEDURE — 83735 ASSAY OF MAGNESIUM: CPT | Performed by: INTERNAL MEDICINE

## 2025-04-30 PROCEDURE — 63710000001 REVEFENACIN 175 MCG/3ML SOLUTION: Performed by: INTERNAL MEDICINE

## 2025-04-30 PROCEDURE — 82948 REAGENT STRIP/BLOOD GLUCOSE: CPT

## 2025-04-30 PROCEDURE — 29580 STRAPPING UNNA BOOT: CPT

## 2025-04-30 PROCEDURE — 94664 DEMO&/EVAL PT USE INHALER: CPT

## 2025-04-30 PROCEDURE — 97598 DBRDMT OPN WND ADDL 20CM/<: CPT

## 2025-04-30 PROCEDURE — 99232 SBSQ HOSP IP/OBS MODERATE 35: CPT

## 2025-04-30 PROCEDURE — 97597 DBRDMT OPN WND 1ST 20 CM/<: CPT

## 2025-04-30 PROCEDURE — 94761 N-INVAS EAR/PLS OXIMETRY MLT: CPT

## 2025-04-30 RX ORDER — MAGNESIUM SULFATE HEPTAHYDRATE 40 MG/ML
4 INJECTION, SOLUTION INTRAVENOUS ONCE
Status: COMPLETED | OUTPATIENT
Start: 2025-04-30 | End: 2025-04-30

## 2025-04-30 RX ORDER — PANTOPRAZOLE SODIUM 40 MG/1
40 TABLET, DELAYED RELEASE ORAL
Status: DISCONTINUED | OUTPATIENT
Start: 2025-04-30 | End: 2025-05-02 | Stop reason: HOSPADM

## 2025-04-30 RX ADMIN — SENNOSIDES AND DOCUSATE SODIUM 2 TABLET: 50; 8.6 TABLET ORAL at 21:22

## 2025-04-30 RX ADMIN — ACETAMINOPHEN 650 MG: 325 TABLET, FILM COATED ORAL at 18:09

## 2025-04-30 RX ADMIN — INSULIN LISPRO 2 UNITS: 100 INJECTION, SOLUTION INTRAVENOUS; SUBCUTANEOUS at 12:25

## 2025-04-30 RX ADMIN — FINASTERIDE 5 MG: 5 TABLET, FILM COATED ORAL at 09:24

## 2025-04-30 RX ADMIN — MUPIROCIN 1 APPLICATION: 20 OINTMENT TOPICAL at 21:23

## 2025-04-30 RX ADMIN — BUDESONIDE AND FORMOTEROL FUMARATE DIHYDRATE 2 PUFF: 160; 4.5 AEROSOL RESPIRATORY (INHALATION) at 09:47

## 2025-04-30 RX ADMIN — AVOBENZONE, HOMOSALATE, OCTISALATE, OCTOCRYLENE, AND OXYBENZONE 1 PACKET: 29.4; 147; 49; 25.4; 58.8 LOTION TOPICAL at 09:25

## 2025-04-30 RX ADMIN — INSULIN LISPRO 2 UNITS: 100 INJECTION, SOLUTION INTRAVENOUS; SUBCUTANEOUS at 21:22

## 2025-04-30 RX ADMIN — PANTOPRAZOLE SODIUM 40 MG: 40 INJECTION, POWDER, FOR SOLUTION INTRAVENOUS at 09:24

## 2025-04-30 RX ADMIN — REVEFENACIN 175 MCG: 175 SOLUTION RESPIRATORY (INHALATION) at 09:47

## 2025-04-30 RX ADMIN — SENNOSIDES AND DOCUSATE SODIUM 2 TABLET: 50; 8.6 TABLET ORAL at 09:24

## 2025-04-30 RX ADMIN — CETIRIZINE HYDROCHLORIDE 10 MG: 10 TABLET, FILM COATED ORAL at 09:28

## 2025-04-30 RX ADMIN — MAGNESIUM SULFATE IN WATER FOR 4 G: 40 INJECTION INTRAVENOUS at 09:25

## 2025-04-30 RX ADMIN — MUPIROCIN 1 APPLICATION: 20 OINTMENT TOPICAL at 09:24

## 2025-04-30 RX ADMIN — PANTOPRAZOLE SODIUM 40 MG: 40 TABLET, DELAYED RELEASE ORAL at 17:41

## 2025-04-30 RX ADMIN — TAMSULOSIN HYDROCHLORIDE 0.4 MG: 0.4 CAPSULE ORAL at 09:24

## 2025-04-30 RX ADMIN — Medication 10 ML: at 21:23

## 2025-04-30 RX ADMIN — BUDESONIDE AND FORMOTEROL FUMARATE DIHYDRATE 2 PUFF: 160; 4.5 AEROSOL RESPIRATORY (INHALATION) at 19:38

## 2025-04-30 RX ADMIN — MONTELUKAST 10 MG: 10 TABLET, FILM COATED ORAL at 21:22

## 2025-04-30 NOTE — PLAN OF CARE
Goal Outcome Evaluation:                                            A&Ox4  Afebrile  One small bowel movement, with no signs of active bleeding.  H/H at 0000 7.1/23.0. Next draw at 0800.  UOP adequate.  Ordered to the floor.

## 2025-04-30 NOTE — PLAN OF CARE
Goal Outcome Evaluation:  Plan of Care Reviewed With: patient           Outcome Evaluation: Pt presents with moderate LE edema, improved since admission, but with moderate dry, flaking, nonviable skin.  PT was able to debride a significant amount of hypertrophic crust to help decrease bioburden and improve skin integrity. PT also applied light compression wrapping with unna boots to help further soften remaining crusts to allow for easier removal with next tx. Pt will benefit from home health for follow up once d/c'd to cont with compression wrapping and LE washing.

## 2025-04-30 NOTE — THERAPY RE-EVALUATION
Acute Care - Wound/Debridement Initial Evaluation  [facility]     Patient Name: Jonatan Mcguire  : 1950  MRN: [id number]  Today's Date: 2025                Admit Date: 2025    Visit Dx:    ICD-10-CM ICD-9-CM   1. Gastrointestinal hemorrhage associated with anorectal source  K62.5 569.3   2. Acute blood loss anemia  D62 285.1             Patient Active Problem List   Diagnosis    Hyperkalemia    GIB (gastrointestinal bleeding)    COPD (chronic obstructive pulmonary disease)    Afib    Chronic anticoagulation    Diabetes mellitus, type II    Sleep apnea    Hypertension    Acute blood loss anemia        Past Medical History:   Diagnosis Date    Afib     COPD (chronic obstructive pulmonary disease)     Diabetes mellitus, type II     Hypertension     Obesity     Sleep apnea     Vitamin D deficiency         Past Surgical History:   Procedure Laterality Date    COLON RESECTION      COLONOSCOPY N/A 2025    Procedure: COLONOSCOPY;  Surgeon: Beau Mcclain MD;  Location: [facility] ENDOSCOPY;  Service: Gastroenterology;  Laterality: N/A;    NASAL POLYP EXCISION                 Lymphedema       Row Name 25 1345             Lymphedema Edema Assessment    Ptting Edema Category By severity  -      Pitting Edema Moderate  -         Skin Changes/Observations    Location/Assessment Lower Extremity  -      Lower Extremity Conditions bilateral:;scaly;crust  -      Lower Extremity Color/Pigment bilateral:;hyperpigmented  -         Lymphedema Pulses/Capillary Refill    Lymphedema Pulses/Capillary Refill lower extremity pulses;capillary refill  -      Dorsalis Pedis Pulse right:;left:;+2 normal  -      Capillary Refill lower extremity capillary refill  -      Lower Extremity Capillary Refill right:;left:;less than 3 seconds  -         Compression/Skin Care    Compression/Skin Care skin care;wrapping location;bandaging  -      Skin Care washed/dried;lotion applied  -      Wrapping Location lower  extremity  -MF      Wrapping Location LE bilateral:;foot to knee  -      Wrapping Comments unna boot applied in clamshell with coban and spandage  -                User Key  (r) = Recorded By, (t) = Taken By, (c) = Cosigned By      Initials Name Provider Type     Duane Cespedes, PT Physical Therapist                    WOUND DEBRIDEMENT  Total area of Debridement: ~200cm2 ([id number] area to BLES)  Debridement Site 1  Location- Site 1: BLEs  Selective Debridement- Site 1: Wound Surface <20cmsq  Instruments- Site 1: tweezers  Excised Tissue Description- Site 1: maximum, other (comment) (crusted nonviable hypertrophic skin)  Bleeding- Site 1: seeping, held pressure, 1 minute               PT Assessment (Last 12 Hours)       PT Evaluation and Treatment       Row Name 04/30/25 2381          Physical Therapy Time and Intention    Subjective Information complains of;weakness;fatigue;pain  -     Document Type evaluation;therapy note (daily note);wound care  -     Mode of Treatment individual therapy;physical therapy  -       Row Name 04/30/25 7783          General Information    Patient Profile Reviewed yes  -     Patient Observations alert;cooperative;agree to therapy  -     Pertinent History of Current Functional Problem BLE edema with moderate crusted nonviable skin  -     Risks Reviewed patient:;increased discomfort  -     Benefits Reviewed patient:;improve skin integrity  -     Barriers to Rehab medically complex;previous functional deficit;physical barrier  -       Row Name 04/30/25 1566          Pain    Pretreatment Pain Rating 2/10  -     Posttreatment Pain Rating 2/10  -     Pain Location extremity  -     Pain Side/Orientation bilateral;lower  -     Pain Management Interventions positioning techniques utilized  -     Pre/Posttreatment Pain Comment mild c/o pain with debridement 3-4/10  -       Row Name 04/30/25 3760          Cognition    Affect/Mental Status (Cognition) WNL   -       Row Name 04/30/25 1441          Coping    Observed Emotional State calm;cooperative  -     Verbalized Emotional State acceptance  -     Trust Relationship/Rapport care explained  -       Row Name 04/30/25 4963          Plan of Care Review    Plan of Care Reviewed With patient  -     Outcome Evaluation Pt presents with moderate LE edema, improved since admission, but with moderate dry, flaking, nonviable skin.  PT was able to debride a significant amount of hypertrophic crust to help decrease bioburden and improve skin integrity. PT also applied light compression wrapping with unna boots to help further soften remaining crusts to allow for easier removal with next tx. Pt will benefit from home health for follow up once d/c'd to cont with compression wrapping and LE washing.  -       Row Name 04/30/25 1260          Positioning and Restraints    Pre-Treatment Position in bed  -     Post Treatment Position bed  -     In Bed supine;call light within reach  -       Row Name 04/30/25 9511          Therapy Assessment/Plan (PT)    Patient/Family Therapy Goals Statement (PT) decrease LE edema  -     PT Diagnosis (PT) BLE edema with crusted nonviable skin  -     Rehab Potential (PT) fair  -     Criteria for Skilled Interventions Met (PT) yes;meets criteria;skilled treatment is necessary  -     Predicted Duration of Therapy Intervention (PT) 10 days  -       Row Name 04/30/25 7288          Therapy Plan Review/Discharge Plan (PT)    Therapy Plan Review (PT) evaluation/treatment results reviewed;care plan/treatment goals reviewed;risks/benefits reviewed;current/potential barriers reviewed;participants voiced agreement with care plan;participants included;patient  -       Row Name 04/30/25 9256          Physical Therapy Goals    Wound Management Goal Selection (PT) wound management, PT goal 1;wound management, PT goal 2  -       Row Name 04/30/25 5272          Wound Management Goal 1 (PT)     Wound Management Goal (Wound Goal 1, PT) Pt to have minimal edema to LEs to improve skin integrity and mobility.  -     Time Frame (Wound Goal 1, PT) 1 week  -       Row Name 04/30/25 1345          Wound Management Goal 2 (PT)    Wound Management Goal (Wound Goal 2, PT) Pt to have no hypertrophic crust to LEs to improve skin integrity  -     Time Frame (Wound Goal 2, PT) 2 weeks  -               User Key  (r) = Recorded By, (t) = Taken By, (c) = Cosigned By      Initials Name Provider Type     Duane Cespedes, PT Physical Therapist                  Physical Therapy Education       Title: PT OT SLP Therapies (In Progress)       Topic: Physical Therapy (In Progress)       Point: Mobility training (Done)       Learning Progress Summary            Patient Acceptance, E,TB, VU by  at 4/29/2025 1142                      Point: Home exercise program (Not Started)       Learner Progress:  Not documented in this visit.              Point: Body mechanics (Done)       Learning Progress Summary            Patient Acceptance, E,TB, VU by  at 4/29/2025 1142                      Point: Precautions (Done)       Learning Progress Summary            Patient Acceptance, E,TB, VU by  at 4/29/2025 1142                                      User Key       Initials Effective Dates Name Provider Type Discipline     08/11/22 -  Malika Funk PT Physical Therapist PT                    Recommendation and Plan  Anticipated Discharge Disposition (PT): home with assist, home with home health  Planned Therapy Interventions (PT): wound care, patient/family education  Therapy Frequency (PT): daily  Plan of Care Reviewed With: patient           Outcome Evaluation: Pt presents with moderate LE edema, improved since admission, but with moderate dry, flaking, nonviable skin.  PT was able to debride a significant amount of hypertrophic crust to help decrease bioburden and improve skin integrity. PT also applied light compression  wrapping with unna boots to help further soften remaining crusts to allow for easier removal with next tx. Pt will benefit from home health for follow up once d/c'd to cont with compression wrapping and LE washing.  Plan of Care Reviewed With: patient            Time Calculation   PT Charges       Row Name 04/30/25 1345             Time Calculation    Start Time 1345  -MF      PT Goal Re-Cert Due Date 05/09/25  -MF         Untimed Charges    PT Eval/Re-eval Minutes 25  -MF      Wound Care 49024 Selective debridement;86946 Add't debridement ea 20 cm;45509 Unna boot  -MF      29580-Unna Boot 15  -MF      48604-Zvygyanlm debridement 25  -MF      97598-Add't debridement ea 20 cm 25  -MF         Total Minutes    Untimed Charges Total Minutes 90  -MF       Total Minutes 90  -MF                User Key  (r) = Recorded By, (t) = Taken By, (c) = Cosigned By      Initials Name Provider Type     Duane Cespedes, PT Physical Therapist                      Therapy Charges for Today       Code Description Service Date Service Provider Modifiers Qty    31724546187 HC RONALD DEBRIDE OPEN WOUND UP TO 20CM 4/30/2025 Duane Cespedes, PT GP 1    27425052194 HC PT RONALD DEBRIDE OPEN WOUND EA ADD 20CM 4/30/2025 Duane Cespedes, PT GP 9    31398305381 HC PT STAPPING UNNA BOOT 4/30/2025 Duane Cespedes, PT GP 1    72242470879 HC PT RE-EVAL ESTABLISHED PLAN 2 4/30/2025 Duane Cespedes, PT GP 1              PT G-Codes  Outcome Measure Options: AM-PAC 6 Clicks Basic Mobility (PT)  AM-PAC 6 Clicks Score (PT): 17       Duane Cespedes, PT  4/30/2025

## 2025-04-30 NOTE — PROGRESS NOTES
Intensive Care Follow-up     Hospital:  LOS: 4 days   Mr. Jonatan Mcguire, 75 y.o. male is followed for:   GIB (gastrointestinal bleeding)          Subjective   Interval History:  Chart reviewed. No acute events overnight. H&H dropped slightly but has since stabilized overnight.    Patient is resting in bed without complaints. Denies nausea, vomiting, diarrhea, chest pain, shortness of breath.      The patient's past medical, surgical and social history were reviewed and updated in Epic as appropriate.       Objective     Infusions:     Medications:  budesonide-formoterol, 2 puff, Inhalation, BID - RT  cetirizine, 10 mg, Oral, Daily  finasteride, 5 mg, Oral, Daily  insulin lispro, 2-9 Units, Subcutaneous, 4x Daily AC & at Bedtime  magnesium sulfate, 4 g, Intravenous, Once  montelukast, 10 mg, Oral, Nightly  mupirocin, 1 Application, Each Nare, BID  pantoprazole, 40 mg, Intravenous, BID AC  Psyllium, 1 packet, Oral, Daily  revefenacin, 175 mcg, Nebulization, Daily - RT  senna-docusate sodium, 2 tablet, Oral, BID  sodium chloride, 10 mL, Intravenous, Q12H  tamsulosin, 0.4 mg, Oral, Daily      I reviewed the patient's medications.    Vital Sign Min/Max for last 24 hours  Temp  Min: 97.6 °F (36.4 °C)  Max: 98.2 °F (36.8 °C)   BP  Min: 101/54  Max: 128/75   Pulse  Min: 74  Max: 111   Resp  Min: 14  Max: 18   SpO2  Min: 90 %  Max: 100 %   Flow (L/min) (Oxygen Therapy)  Min: 2  Max: 2       Input/Output for last 24 hour shift  04/29 0701 - 04/30 0700  In: 1358 [P.O.:1358]  Out: 2425 [Urine:2425]        Physical Exam:  GENERAL: Patient lying in bed, conversant. No acute distress.   HEENT: Normocephalic and atraumatic. Trachea midline. PER. EOM WNL.   LUNGS: Chest rise of normal depth and symmetric. Lungs clear to auscultation bilaterally. No wheezes, rhonchi, or rales.   HEART: S1,S2 detected. Regular rate and rhythm. No rub, murmur, or gallop.   ABDOMEN: Soft, round, nondistended, and nontender. Bowel sounds present.    EXTREMITIES: No clubbing, edema, or cyanosis. Peripheral pulses present. Skin warm and dry.    NEURO/PSYCH: Alert and oriented. Follows commands. Moves all extremities. No focal deficits.      Results from last 7 days   Lab Units 04/30/25  0742 04/29/25  2344 04/29/25  1642 04/29/25  1355 04/29/25  0309 04/27/25  0955 04/27/25  0211   WBC 10*3/mm3 9.67  --   --   --  10.35  --  9.03   HEMOGLOBIN g/dL 7.2* 7.1* 8.4*   < > 7.3*   < > 7.9*   PLATELETS 10*3/mm3 143  --   --   --  131*  --  140    < > = values in this interval not displayed.     Results from last 7 days   Lab Units 04/30/25  0742 04/29/25  0309 04/28/25  0207 04/27/25  1516 04/27/25  0211   SODIUM mmol/L 139 139 139  --  140   POTASSIUM mmol/L 4.3 4.3 4.4  --  4.5   CO2 mmol/L 31.0* 29.0 29.0  --  29.0   BUN mg/dL 8 13 14  --  19   CREATININE mg/dL 0.67* 0.88 0.75*  --  0.82   MAGNESIUM mg/dL 1.7 1.4*  --  2.3 1.5*   PHOSPHORUS mg/dL 3.4 4.1  --   --  3.1   GLUCOSE mg/dL 83 80 82  --  84     Estimated Creatinine Clearance: 117.8 mL/min (A) (by C-G formula based on SCr of 0.67 mg/dL (L)).          I reviewed the patient's new clinical results.  I reviewed the patient's new imaging results/reports including actual images and agree with reports.     Imaging Results (Last 24 Hours)       ** No results found for the last 24 hours. **            Assessment & Plan   Impression      GIB (gastrointestinal bleeding)    COPD (chronic obstructive pulmonary disease)    Afib    Chronic anticoagulation    Diabetes mellitus, type II    Sleep apnea    Hypertension    Acute blood loss anemia    Jonatan Mcguire is a 75 year-old male with COPD, chronic hypoxic respiratory failure on 2 L home NC, T2DM, HTN, NEW, and Afib on Xarelto who presented to [facility] on 4/26/25 with BRBPR. Since admission, he has been transfused 3 units PRBCs.Received Kcentra. GI is following CTA showed active extravasation into the colon at the junction of the descending colon and sigmoid colon as well as  multiple pancreatic cystic lesions.     Colonoscopy on 4/28 with no active bleeding. Diverticula present and previous bleeding presumed to be diverticular in nature.      Plan      For GIB: Continue to monitor H&H q8h. Dropped slightly but has stabilized. Recommended by GI to hold anticoagulation until at least 5/1/25. Change BID PPI to PO.  For T2DM: Continue correction insulin. Goal < 180 inpatient.  For Pancreatic lesions: Will need outpatient MRI pancreas protocol.   For COPD, NEW: Continue home O2 at 2L NC. Continue Symbicort, Yupelri, Singulair. Nightly Bipap  For HTN: Restart meds if needed.  For Afib on Eliquis: Hold anticoagulation for at least 72 hours (until 5/1/25) per GI recommendations.   AM labs    DVT Prophylaxis: SCDs  GI Prophylaxis: PPI BID  Dispo: Telemetry when bed available    Time spent: 38 minutes  Plan of care and goals reviewed with multidisciplinary/antibiotic stewardship team during rounds.   I discussed the patient's findings and my recommendations with patient and nursing staff     Marcelo Garcia, MSN, APRN, AGACNP-BC  Pulmonary and Critical Care Medicine

## 2025-05-01 LAB
ANION GAP SERPL CALCULATED.3IONS-SCNC: 7 MMOL/L (ref 5–15)
BUN SERPL-MCNC: 10 MG/DL (ref 8–23)
BUN/CREAT SERPL: 11.5 (ref 7–25)
CALCIUM SPEC-SCNC: 7.9 MG/DL (ref 8.6–10.5)
CHLORIDE SERPL-SCNC: 99 MMOL/L (ref 98–107)
CO2 SERPL-SCNC: 31 MMOL/L (ref 22–29)
CREAT SERPL-MCNC: 0.87 MG/DL (ref 0.76–1.27)
DEPRECATED RDW RBC AUTO: 66.7 FL (ref 37–54)
EGFRCR SERPLBLD CKD-EPI 2021: 89.4 ML/MIN/1.73
ERYTHROCYTE [DISTWIDTH] IN BLOOD BY AUTOMATED COUNT: 19.4 % (ref 12.3–15.4)
GLUCOSE BLDC GLUCOMTR-MCNC: 114 MG/DL (ref 70–130)
GLUCOSE BLDC GLUCOMTR-MCNC: 196 MG/DL (ref 70–130)
GLUCOSE BLDC GLUCOMTR-MCNC: 202 MG/DL (ref 70–130)
GLUCOSE BLDC GLUCOMTR-MCNC: 96 MG/DL (ref 70–130)
GLUCOSE SERPL-MCNC: 114 MG/DL (ref 65–99)
HCT VFR BLD AUTO: 23.2 % (ref 37.5–51)
HCT VFR BLD AUTO: 24.8 % (ref 37.5–51)
HCT VFR BLD AUTO: 25.2 % (ref 37.5–51)
HGB BLD-MCNC: 7.1 G/DL (ref 13–17.7)
HGB BLD-MCNC: 7.4 G/DL (ref 13–17.7)
HGB BLD-MCNC: 7.6 G/DL (ref 13–17.7)
MAGNESIUM SERPL-MCNC: 1.8 MG/DL (ref 1.6–2.4)
MCH RBC QN AUTO: 29.2 PG (ref 26.6–33)
MCHC RBC AUTO-ENTMCNC: 30.6 G/DL (ref 31.5–35.7)
MCV RBC AUTO: 95.5 FL (ref 79–97)
PHOSPHATE SERPL-MCNC: 3.4 MG/DL (ref 2.5–4.5)
PLATELET # BLD AUTO: 155 10*3/MM3 (ref 140–450)
PMV BLD AUTO: 9.3 FL (ref 6–12)
POTASSIUM SERPL-SCNC: 4.1 MMOL/L (ref 3.5–5.2)
RBC # BLD AUTO: 2.43 10*6/MM3 (ref 4.14–5.8)
SODIUM SERPL-SCNC: 137 MMOL/L (ref 136–145)
WBC NRBC COR # BLD AUTO: 10.41 10*3/MM3 (ref 3.4–10.8)

## 2025-05-01 PROCEDURE — 82948 REAGENT STRIP/BLOOD GLUCOSE: CPT

## 2025-05-01 PROCEDURE — 25010000002 MAGNESIUM SULFATE 4 GM/100ML SOLUTION: Performed by: STUDENT IN AN ORGANIZED HEALTH CARE EDUCATION/TRAINING PROGRAM

## 2025-05-01 PROCEDURE — 85014 HEMATOCRIT: CPT | Performed by: INTERNAL MEDICINE

## 2025-05-01 PROCEDURE — 83735 ASSAY OF MAGNESIUM: CPT | Performed by: INTERNAL MEDICINE

## 2025-05-01 PROCEDURE — 84100 ASSAY OF PHOSPHORUS: CPT

## 2025-05-01 PROCEDURE — 25010000002 ONDANSETRON PER 1 MG: Performed by: INTERNAL MEDICINE

## 2025-05-01 PROCEDURE — 80048 BASIC METABOLIC PNL TOTAL CA: CPT

## 2025-05-01 PROCEDURE — 99232 SBSQ HOSP IP/OBS MODERATE 35: CPT | Performed by: STUDENT IN AN ORGANIZED HEALTH CARE EDUCATION/TRAINING PROGRAM

## 2025-05-01 PROCEDURE — 63710000001 REVEFENACIN 175 MCG/3ML SOLUTION: Performed by: INTERNAL MEDICINE

## 2025-05-01 PROCEDURE — 94761 N-INVAS EAR/PLS OXIMETRY MLT: CPT

## 2025-05-01 PROCEDURE — 94799 UNLISTED PULMONARY SVC/PX: CPT

## 2025-05-01 PROCEDURE — 94664 DEMO&/EVAL PT USE INHALER: CPT

## 2025-05-01 PROCEDURE — 85027 COMPLETE CBC AUTOMATED: CPT

## 2025-05-01 PROCEDURE — 85018 HEMOGLOBIN: CPT | Performed by: INTERNAL MEDICINE

## 2025-05-01 PROCEDURE — 63710000001 INSULIN LISPRO (HUMAN) PER 5 UNITS: Performed by: INTERNAL MEDICINE

## 2025-05-01 RX ORDER — MAGNESIUM SULFATE HEPTAHYDRATE 40 MG/ML
4 INJECTION, SOLUTION INTRAVENOUS ONCE
Status: COMPLETED | OUTPATIENT
Start: 2025-05-01 | End: 2025-05-01

## 2025-05-01 RX ADMIN — ACETAMINOPHEN 650 MG: 325 TABLET, FILM COATED ORAL at 20:43

## 2025-05-01 RX ADMIN — RIVAROXABAN 20 MG: 20 TABLET, FILM COATED ORAL at 17:31

## 2025-05-01 RX ADMIN — REVEFENACIN 175 MCG: 175 SOLUTION RESPIRATORY (INHALATION) at 09:29

## 2025-05-01 RX ADMIN — FINASTERIDE 5 MG: 5 TABLET, FILM COATED ORAL at 08:00

## 2025-05-01 RX ADMIN — ACETAMINOPHEN 650 MG: 325 TABLET, FILM COATED ORAL at 03:45

## 2025-05-01 RX ADMIN — MONTELUKAST 10 MG: 10 TABLET, FILM COATED ORAL at 20:43

## 2025-05-01 RX ADMIN — SENNOSIDES AND DOCUSATE SODIUM 2 TABLET: 50; 8.6 TABLET ORAL at 08:00

## 2025-05-01 RX ADMIN — INSULIN LISPRO 4 UNITS: 100 INJECTION, SOLUTION INTRAVENOUS; SUBCUTANEOUS at 12:53

## 2025-05-01 RX ADMIN — TAMSULOSIN HYDROCHLORIDE 0.4 MG: 0.4 CAPSULE ORAL at 08:00

## 2025-05-01 RX ADMIN — CETIRIZINE HYDROCHLORIDE 10 MG: 10 TABLET, FILM COATED ORAL at 08:00

## 2025-05-01 RX ADMIN — INSULIN LISPRO 2 UNITS: 100 INJECTION, SOLUTION INTRAVENOUS; SUBCUTANEOUS at 20:42

## 2025-05-01 RX ADMIN — ONDANSETRON 4 MG: 2 INJECTION INTRAMUSCULAR; INTRAVENOUS at 21:23

## 2025-05-01 RX ADMIN — PANTOPRAZOLE SODIUM 40 MG: 40 TABLET, DELAYED RELEASE ORAL at 08:00

## 2025-05-01 RX ADMIN — MAGNESIUM SULFATE IN WATER FOR 4 G: 40 INJECTION INTRAVENOUS at 06:23

## 2025-05-01 RX ADMIN — BUDESONIDE AND FORMOTEROL FUMARATE DIHYDRATE 2 PUFF: 160; 4.5 AEROSOL RESPIRATORY (INHALATION) at 09:29

## 2025-05-01 RX ADMIN — AVOBENZONE, HOMOSALATE, OCTISALATE, OCTOCRYLENE, AND OXYBENZONE 1 PACKET: 29.4; 147; 49; 25.4; 58.8 LOTION TOPICAL at 08:00

## 2025-05-01 RX ADMIN — MUPIROCIN 1 APPLICATION: 20 OINTMENT TOPICAL at 08:07

## 2025-05-01 RX ADMIN — Medication 10 ML: at 08:00

## 2025-05-01 RX ADMIN — BUDESONIDE AND FORMOTEROL FUMARATE DIHYDRATE 2 PUFF: 160; 4.5 AEROSOL RESPIRATORY (INHALATION) at 19:27

## 2025-05-01 RX ADMIN — PANTOPRAZOLE SODIUM 40 MG: 40 TABLET, DELAYED RELEASE ORAL at 17:31

## 2025-05-01 NOTE — PLAN OF CARE
Problem: Adult Inpatient Plan of Care  Goal: Plan of Care Review  Outcome: Progressing  Goal: Patient-Specific Goal (Individualized)  Outcome: Progressing  Goal: Absence of Hospital-Acquired Illness or Injury  Outcome: Progressing  Intervention: Identify and Manage Fall Risk  Recent Flowsheet Documentation  Taken 4/30/2025 1955 by Vicki Myrick RN  Safety Promotion/Fall Prevention:   safety round/check completed   fall prevention program maintained   clutter free environment maintained  Intervention: Prevent Skin Injury  Recent Flowsheet Documentation  Taken 4/30/2025 1955 by Vicki Myrick RN  Body Position: position changed independently  Skin Protection: incontinence pads utilized  Goal: Optimal Comfort and Wellbeing  Outcome: Progressing  Intervention: Provide Person-Centered Care  Recent Flowsheet Documentation  Taken 4/30/2025 1955 by Vicki Myrick RN  Trust Relationship/Rapport:   care explained   empathic listening provided   emotional support provided   questions answered  Goal: Readiness for Transition of Care  Outcome: Progressing     Problem: Skin Injury Risk Increased  Goal: Skin Health and Integrity  Outcome: Progressing  Intervention: Optimize Skin Protection  Recent Flowsheet Documentation  Taken 4/30/2025 1955 by Vicki Myrick RN  Activity Management: activity minimized  Pressure Reduction Techniques: pressure points protected  Pressure Reduction Devices: foam padding utilized  Skin Protection: incontinence pads utilized     Problem: Noninvasive Ventilation Acute  Goal: Effective Unassisted Ventilation and Oxygenation  Outcome: Progressing     Problem: Fall Injury Risk  Goal: Absence of Fall and Fall-Related Injury  Outcome: Progressing  Intervention: Promote Injury-Free Environment  Recent Flowsheet Documentation  Taken 4/30/2025 1955 by Vicki Myrick RN  Safety Promotion/Fall Prevention:   safety round/check completed   fall prevention program maintained   clutter free  environment maintained   Goal Outcome Evaluation:

## 2025-05-01 NOTE — PROGRESS NOTES
[facility]  PROGRESS NOTE    Patient Name: Jonatan Mcguire  : 1950  MRN: [id number]    Date of Admission: 2025  Primary Care Physician: Cheli Damico MD    Subjective   Subjective     CC:  Follow-up GI bleed      HPI:  No new overnight events.  Has not yet had a bowel movement.  Asking when he can go home      Objective   Objective     Vital Signs:   Temp:  [98.3 °F (36.8 °C)-99.4 °F (37.4 °C)] 98.8 °F (37.1 °C)  Heart Rate:  [83-99] 84  Resp:  [16-18] 16  BP: (110-123)/(58-73) 117/63  Flow (L/min) (Oxygen Therapy):  [2] 2     Physical Exam:  Constitutional: No acute distress, awake, alert  HENT: NCAT, mucous membranes moist  Respiratory: Clear to auscultation bilaterally, respiratory effort normal   Cardiovascular: RRR, no murmurs, rubs, or gallops  Gastrointestinal: Positive bowel sounds, soft, nontender, nondistended  Musculoskeletal: No bilateral ankle edema  Psychiatric: Appropriate affect, cooperative  Neurologic: Oriented x 3, strength symmetric in all extremities, Cranial Nerves grossly intact to confrontation, speech clear  Skin: No rashes      Results Reviewed:  LAB RESULTS:      Lab 25  0419 25  0030 25  1902 25  0742 25  2344 25  1355 25  0309 25  0955 25  0211 25  1905 25  1632   WBC 10.41  --   --  9.67  --   --  10.35  --  9.03  --  10.30   HEMOGLOBIN 7.1* 7.4* 8.3* 7.2* 7.1*   < > 7.3*   < > 7.9*   < > 10.5*   HEMATOCRIT 23.2* 24.8* 27.1* 23.4* 23.0*   < > 23.5*   < > 26.6*   < > 35.0*   PLATELETS 155  --   --  143  --   --  131*  --  140  --  173   NEUTROS ABS  --   --   --  3.91  --   --   --   --   --   --  6.63   IMMATURE GRANS (ABS)  --   --   --  0.03  --   --   --   --   --   --  0.03   LYMPHS ABS  --   --   --  4.39*  --   --   --   --   --   --  2.81   MONOS ABS  --   --   --  1.21*  --   --   --   --   --   --  0.81   EOS ABS  --   --   --  0.12  --   --   --    --   --   --  0.01   MCV 95.5  --   --  95.5  --   --  94.0  --  96.4  --  94.1   LACTATE  --   --   --   --   --   --   --   --   --   --  1.7   PROTIME  --   --   --   --   --   --   --   --  17.6*  --  19.1*    < > = values in this interval not displayed.         Lab 05/01/25  0419 04/30/25  0742 04/29/25  0309 04/28/25  0207 04/27/25  1516 04/27/25 0211 04/26/25  1632   SODIUM 137 139 139 139  --  140 141   POTASSIUM 4.1 4.3 4.3 4.4  --  4.5 4.8   CHLORIDE 99 99 102 104  --  106 103   CO2 31.0* 31.0* 29.0 29.0  --  29.0 29.0   ANION GAP 7.0 9.0 8.0 6.0  --  5.0 9.0   BUN 10 8 13 14  --  19 22   CREATININE 0.87 0.67* 0.88 0.75*  --  0.82 0.92   EGFR 89.4 96.8 89.1 93.5  --  91.0 86.2   GLUCOSE 114* 83 80 82  --  84 166*   CALCIUM 7.9* 7.8* 7.5* 7.7*  --  7.8* 8.9   MAGNESIUM 1.8 1.7 1.4*  --  2.3 1.5*  --    PHOSPHORUS 3.4 3.4 4.1  --   --  3.1  --    HEMOGLOBIN A1C  --   --   --   --   --   --  6.40*   TSH  --   --   --   --   --   --  1.110         Lab 04/26/25  1632   TOTAL PROTEIN 6.0   ALBUMIN 3.5   GLOBULIN 2.5   ALT (SGPT) 23   AST (SGOT) 24   BILIRUBIN 0.5   ALK PHOS 45         Lab 04/27/25 0211 04/26/25  1632   PROBNP  --  558.5   PROTIME 17.6* 19.1*   INR 1.36* 1.50*             Lab 04/26/25 2037 04/26/25  1634   ABO TYPING O O   RH TYPING Negative Negative   ANTIBODY SCREEN  --  Negative         Brief Urine Lab Results       None            Microbiology Results Abnormal       None            No radiology results from the last 24 hrs        Current medications:  Scheduled Meds:budesonide-formoterol, 2 puff, Inhalation, BID - RT  cetirizine, 10 mg, Oral, Daily  finasteride, 5 mg, Oral, Daily  insulin lispro, 2-9 Units, Subcutaneous, 4x Daily AC & at Bedtime  montelukast, 10 mg, Oral, Nightly  mupirocin, 1 Application, Each Nare, BID  pantoprazole, 40 mg, Oral, BID AC  Psyllium, 1 packet, Oral, Daily  revefenacin, 175 mcg, Nebulization, Daily - RT  rivaroxaban, 20 mg, Oral, Daily With  Dinner  senna-docusate sodium, 2 tablet, Oral, BID  sodium chloride, 10 mL, Intravenous, Q12H  tamsulosin, 0.4 mg, Oral, Daily      Continuous Infusions:   PRN Meds:.  acetaminophen    albuterol    senna-docusate sodium **AND** polyethylene glycol **AND** bisacodyl **AND** bisacodyl    Calcium Replacement - Follow Nurse / BPA Driven Protocol    dextrose    dextrose    glucagon (human recombinant)    Magnesium Cardiology Dose Replacement - Follow Nurse / BPA Driven Protocol    nitroglycerin    ondansetron ODT **OR** ondansetron    Phosphorus Replacement - Follow Nurse / BPA Driven Protocol    Potassium Replacement - Follow Nurse / BPA Driven Protocol    sodium chloride    sodium chloride    sodium chloride    Assessment & Plan   Assessment & Plan     Active Hospital Problems    Diagnosis  POA    **GIB (gastrointestinal bleeding) [K92.2]  Yes    Chronic anticoagulation [Z79.01]  Not Applicable    Acute blood loss anemia [D62]  Unknown    COPD (chronic obstructive pulmonary disease) [J44.9]  Unknown    Afib [I48.91]  Unknown    Diabetes mellitus, type II [E11.9]  Unknown    Sleep apnea [G47.30]  Unknown    Hypertension [I10]  Unknown      Resolved Hospital Problems   No resolved problems to display.        Brief Hospital Course to date:  Jonatan Mcguire is a 75 y.o. male with history of COPD, diabetes, hypertension, NEW, A-fib on Xarelto who presented with right parotid blood per rectum.  This hospital course he has required 3 units of packed red blood cells transfused.  CTA imaging was notable for active extravasation into the colon at the junction of the descending colon and sigmoid colon as well as multiple pancreatic cystic lesions.  GI followed and performed colonoscopy on 4/28.  No active bleeding was seen, diverticuli were present and prior bleeding presumed to be diverticular.  He was transferred out of the ICU to telemetry on 4/29    GI bleed  Diverticular bleed  - Colonoscopy on 4/28 showed normal ileum,  diverticulosis in the sigmoid colon and distal descending colon and internal hemorrhoids.  GI suspects diverticular etiology of bleeding with resolution of bleeding at this time.  Resume diet, daily fiber.  Recommended to hold anticoagulation for at least 72 hours.  Needs repeat colonoscopy in 5 years for surveillance  - Continue PPI  - Continue to monitor H&H, restart anticoagulation today as per GI recommendations with monitoring H&H    Pancreatic lesions  - Outpatient MRI pancreas protocol    A-fib   -Resume Xarelto today    COPD  NEW  - Continue home inhalers, O2 as needed    Hypertension  --stable off antihypertensives currently    Expected Discharge Location and Transportation: home  Expected Discharge   Expected Discharge Date: 5/4/2025; Expected Discharge Time:      VTE Prophylaxis:  Pharmacologic & mechanical VTE prophylaxis orders are present.         AM-PAC 6 Clicks Score (PT): 17 (05/01/25 0800)    CODE STATUS:   Code Status and Medical Interventions: CPR (Attempt to Resuscitate); Full Support   Ordered at: 04/26/25 2013     Code Status (Patient has no pulse and is not breathing):    CPR (Attempt to Resuscitate)     Medical Interventions (Patient has pulse or is breathing):    Full Support       Anne Cha MD  05/01/25

## 2025-05-02 VITALS
WEIGHT: 229.5 LBS | SYSTOLIC BLOOD PRESSURE: 127 MMHG | TEMPERATURE: 98.1 F | DIASTOLIC BLOOD PRESSURE: 66 MMHG | HEART RATE: 83 BPM | HEIGHT: 71 IN | OXYGEN SATURATION: 95 % | BODY MASS INDEX: 32.13 KG/M2 | RESPIRATION RATE: 18 BRPM

## 2025-05-02 PROBLEM — K92.2 GIB (GASTROINTESTINAL BLEEDING): Status: RESOLVED | Noted: 2025-04-26 | Resolved: 2025-05-02

## 2025-05-02 LAB
ABO GROUP BLD: NORMAL
BASOPHILS # BLD AUTO: 0.03 10*3/MM3 (ref 0–0.2)
BASOPHILS NFR BLD AUTO: 0.3 % (ref 0–1.5)
BLD GP AB SCN SERPL QL: NEGATIVE
DEPRECATED RDW RBC AUTO: 68.6 FL (ref 37–54)
EOSINOPHIL # BLD AUTO: 0.12 10*3/MM3 (ref 0–0.4)
EOSINOPHIL NFR BLD AUTO: 1.1 % (ref 0.3–6.2)
ERYTHROCYTE [DISTWIDTH] IN BLOOD BY AUTOMATED COUNT: 19.7 % (ref 12.3–15.4)
GLUCOSE BLDC GLUCOMTR-MCNC: 114 MG/DL (ref 70–130)
GLUCOSE BLDC GLUCOMTR-MCNC: 127 MG/DL (ref 70–130)
GLUCOSE BLDC GLUCOMTR-MCNC: 193 MG/DL (ref 70–130)
HCT VFR BLD AUTO: 24.3 % (ref 37.5–51)
HCT VFR BLD AUTO: 24.3 % (ref 37.5–51)
HGB BLD-MCNC: 7.3 G/DL (ref 13–17.7)
HGB BLD-MCNC: 7.4 G/DL (ref 13–17.7)
IMM GRANULOCYTES # BLD AUTO: 0.03 10*3/MM3 (ref 0–0.05)
IMM GRANULOCYTES NFR BLD AUTO: 0.3 % (ref 0–0.5)
LYMPHOCYTES # BLD AUTO: 4.99 10*3/MM3 (ref 0.7–3.1)
LYMPHOCYTES NFR BLD AUTO: 45.2 % (ref 19.6–45.3)
MAGNESIUM SERPL-MCNC: 1.9 MG/DL (ref 1.6–2.4)
MCH RBC QN AUTO: 29 PG (ref 26.6–33)
MCHC RBC AUTO-ENTMCNC: 30 G/DL (ref 31.5–35.7)
MCV RBC AUTO: 96.4 FL (ref 79–97)
MONOCYTES # BLD AUTO: 1.45 10*3/MM3 (ref 0.1–0.9)
MONOCYTES NFR BLD AUTO: 13.1 % (ref 5–12)
NEUTROPHILS NFR BLD AUTO: 4.43 10*3/MM3 (ref 1.7–7)
NEUTROPHILS NFR BLD AUTO: 40 % (ref 42.7–76)
NRBC BLD AUTO-RTO: 0 /100 WBC (ref 0–0.2)
PLATELET # BLD AUTO: 183 10*3/MM3 (ref 140–450)
PMV BLD AUTO: 9.1 FL (ref 6–12)
RBC # BLD AUTO: 2.52 10*6/MM3 (ref 4.14–5.8)
RH BLD: NEGATIVE
T&S EXPIRATION DATE: NORMAL
WBC NRBC COR # BLD AUTO: 11.05 10*3/MM3 (ref 3.4–10.8)

## 2025-05-02 PROCEDURE — 86900 BLOOD TYPING SEROLOGIC ABO: CPT

## 2025-05-02 PROCEDURE — 63710000001 INSULIN LISPRO (HUMAN) PER 5 UNITS: Performed by: INTERNAL MEDICINE

## 2025-05-02 PROCEDURE — 63710000001 REVEFENACIN 175 MCG/3ML SOLUTION: Performed by: INTERNAL MEDICINE

## 2025-05-02 PROCEDURE — 97110 THERAPEUTIC EXERCISES: CPT

## 2025-05-02 PROCEDURE — 94799 UNLISTED PULMONARY SVC/PX: CPT

## 2025-05-02 PROCEDURE — 25010000002 MAGNESIUM SULFATE 4 GM/100ML SOLUTION: Performed by: STUDENT IN AN ORGANIZED HEALTH CARE EDUCATION/TRAINING PROGRAM

## 2025-05-02 PROCEDURE — 94761 N-INVAS EAR/PLS OXIMETRY MLT: CPT

## 2025-05-02 PROCEDURE — P9016 RBC LEUKOCYTES REDUCED: HCPCS

## 2025-05-02 PROCEDURE — 85025 COMPLETE CBC W/AUTO DIFF WBC: CPT | Performed by: STUDENT IN AN ORGANIZED HEALTH CARE EDUCATION/TRAINING PROGRAM

## 2025-05-02 PROCEDURE — 97116 GAIT TRAINING THERAPY: CPT

## 2025-05-02 PROCEDURE — 86900 BLOOD TYPING SEROLOGIC ABO: CPT | Performed by: STUDENT IN AN ORGANIZED HEALTH CARE EDUCATION/TRAINING PROGRAM

## 2025-05-02 PROCEDURE — 86923 COMPATIBILITY TEST ELECTRIC: CPT

## 2025-05-02 PROCEDURE — 83735 ASSAY OF MAGNESIUM: CPT | Performed by: STUDENT IN AN ORGANIZED HEALTH CARE EDUCATION/TRAINING PROGRAM

## 2025-05-02 PROCEDURE — 86901 BLOOD TYPING SEROLOGIC RH(D): CPT | Performed by: STUDENT IN AN ORGANIZED HEALTH CARE EDUCATION/TRAINING PROGRAM

## 2025-05-02 PROCEDURE — 86850 RBC ANTIBODY SCREEN: CPT | Performed by: STUDENT IN AN ORGANIZED HEALTH CARE EDUCATION/TRAINING PROGRAM

## 2025-05-02 PROCEDURE — 36430 TRANSFUSION BLD/BLD COMPNT: CPT

## 2025-05-02 PROCEDURE — 99239 HOSP IP/OBS DSCHRG MGMT >30: CPT | Performed by: STUDENT IN AN ORGANIZED HEALTH CARE EDUCATION/TRAINING PROGRAM

## 2025-05-02 PROCEDURE — 82948 REAGENT STRIP/BLOOD GLUCOSE: CPT

## 2025-05-02 PROCEDURE — 94664 DEMO&/EVAL PT USE INHALER: CPT

## 2025-05-02 RX ORDER — MAGNESIUM SULFATE HEPTAHYDRATE 40 MG/ML
4 INJECTION, SOLUTION INTRAVENOUS ONCE
Status: COMPLETED | OUTPATIENT
Start: 2025-05-02 | End: 2025-05-02

## 2025-05-02 RX ORDER — METHOCARBAMOL 750 MG/1
750 TABLET, FILM COATED ORAL ONCE
Status: COMPLETED | OUTPATIENT
Start: 2025-05-02 | End: 2025-05-02

## 2025-05-02 RX ORDER — PANTOPRAZOLE SODIUM 40 MG/1
40 TABLET, DELAYED RELEASE ORAL
Qty: 180 TABLET | Refills: 0 | Status: SHIPPED | OUTPATIENT
Start: 2025-05-02

## 2025-05-02 RX ADMIN — METHOCARBAMOL 750 MG: 750 TABLET ORAL at 10:12

## 2025-05-02 RX ADMIN — TAMSULOSIN HYDROCHLORIDE 0.4 MG: 0.4 CAPSULE ORAL at 08:14

## 2025-05-02 RX ADMIN — AVOBENZONE, HOMOSALATE, OCTISALATE, OCTOCRYLENE, AND OXYBENZONE 1 PACKET: 29.4; 147; 49; 25.4; 58.8 LOTION TOPICAL at 08:13

## 2025-05-02 RX ADMIN — INSULIN LISPRO 2 UNITS: 100 INJECTION, SOLUTION INTRAVENOUS; SUBCUTANEOUS at 12:30

## 2025-05-02 RX ADMIN — PANTOPRAZOLE SODIUM 40 MG: 40 TABLET, DELAYED RELEASE ORAL at 08:14

## 2025-05-02 RX ADMIN — REVEFENACIN 175 MCG: 175 SOLUTION RESPIRATORY (INHALATION) at 08:34

## 2025-05-02 RX ADMIN — CETIRIZINE HYDROCHLORIDE 10 MG: 10 TABLET, FILM COATED ORAL at 08:14

## 2025-05-02 RX ADMIN — BUDESONIDE AND FORMOTEROL FUMARATE DIHYDRATE 2 PUFF: 160; 4.5 AEROSOL RESPIRATORY (INHALATION) at 08:34

## 2025-05-02 RX ADMIN — MAGNESIUM SULFATE IN WATER FOR 4 G: 40 INJECTION INTRAVENOUS at 08:13

## 2025-05-02 RX ADMIN — RIVAROXABAN 20 MG: 20 TABLET, FILM COATED ORAL at 17:09

## 2025-05-02 RX ADMIN — ACETAMINOPHEN 650 MG: 325 TABLET, FILM COATED ORAL at 08:13

## 2025-05-02 RX ADMIN — PANTOPRAZOLE SODIUM 40 MG: 40 TABLET, DELAYED RELEASE ORAL at 17:09

## 2025-05-02 RX ADMIN — FINASTERIDE 5 MG: 5 TABLET, FILM COATED ORAL at 08:15

## 2025-05-02 RX ADMIN — Medication 10 ML: at 08:15

## 2025-05-02 NOTE — PLAN OF CARE
Goal Outcome Evaluation:  Plan of Care Reviewed With: patient        Progress: no change  Outcome Evaluation: Patient with decreased activity tolerance this date due to B LE pain(unna boots), increased rtime and effort to completed bed mobility and transfers, mobility limited by B LE weakness and pain. Completed B LE exercises and educated on completing on own throughout the day. Recommend home with asisst and HHPT at D/C when medically appropriate.

## 2025-05-02 NOTE — THERAPY TREATMENT NOTE
Patient Name: Jonatan Mcguire  : 1950    MRN: [id number]                              Today's Date: 2025       Admit Date: 2025    Visit Dx:     ICD-10-CM ICD-9-CM   1. Gastrointestinal hemorrhage associated with anorectal source  K62.5 569.3   2. Acute blood loss anemia  D62 285.1     Patient Active Problem List   Diagnosis    Hyperkalemia    GIB (gastrointestinal bleeding)    COPD (chronic obstructive pulmonary disease)    Afib    Chronic anticoagulation    Diabetes mellitus, type II    Sleep apnea    Hypertension    Acute blood loss anemia     Past Medical History:   Diagnosis Date    Afib     COPD (chronic obstructive pulmonary disease)     Diabetes mellitus, type II     Hypertension     Obesity     Sleep apnea     Vitamin D deficiency      Past Surgical History:   Procedure Laterality Date    COLON RESECTION      COLONOSCOPY N/A 2025    Procedure: COLONOSCOPY;  Surgeon: Beau Mcclain MD;  Location: [facility] ENDOSCOPY;  Service: Gastroenterology;  Laterality: N/A;    NASAL POLYP EXCISION        General Information       Row Name 25 1025          Physical Therapy Time and Intention    Document Type therapy note (daily note)  -AS     Mode of Treatment physical therapy  -AS       Row Name 25 1025          General Information    Patient Profile Reviewed yes  -AS     Existing Precautions/Restrictions oxygen therapy device and L/min  -AS     Barriers to Rehab medically complex;previous functional deficit;physical barrier  -AS       Row Name 25 1025          Cognition    Orientation Status (Cognition) oriented x 3  -AS       Row Name 25 1025          Safety Issues/Impairments Affecting Functional Mobility    Safety Issues Affecting Function (Mobility) awareness of need for assistance;insight into deficits/self-awareness;sequencing abilities;positioning of assistive device  -AS     Impairments Affecting Function (Mobility) balance;endurance/activity tolerance;shortness of  breath;strength  -AS     Comment, Safety Issues/Impairments (Mobility) alert and following commands, patient with new BLE unna boots with complaints of tightness and pain(limiting mobility)  -AS               User Key  (r) = Recorded By, (t) = Taken By, (c) = Cosigned By      Initials Name Provider Type    AS Ruby Christine PTA Physical Therapist Assistant                   Mobility       Row Name 05/02/25 1031          Bed Mobility    Supine-Sit Tripp (Bed Mobility) verbal cues;minimum assist (75% patient effort);1 person assist  -AS     Assistive Device (Bed Mobility) head of bed elevated;bed rails  -AS     Comment, (Bed Mobility) increased time and effort to reach EOB, B LE pain reported throughout session  -AS       Row Name 05/02/25 1031          Transfers    Comment, (Transfers) verbal cues for hand placement  -AS       Row Name 05/02/25 1031          Bed-Chair Transfer    Bed-Chair Tripp (Transfers) verbal cues;minimum assist (75% patient effort);1 person assist  -AS     Assistive Device (Bed-Chair Transfers) walker, front-wheeled  -AS     Comment, (Bed-Chair Transfer) increased difficulty with moiblity due to complaints of B LE pain  -AS       Row Name 05/02/25 1031          Sit-Stand Transfer    Sit-Stand Tripp (Transfers) verbal cues;minimum assist (75% patient effort);1 person assist  -AS     Assistive Device (Sit-Stand Transfers) walker, front-wheeled  -AS       Row Name 05/02/25 1031          Gait/Stairs (Locomotion)    Tripp Level (Gait) unable to assess  -AS     Comment, (Gait/Stairs) transfer to recliner only due to pain, nursing aware and gave pain medicine  -AS               User Key  (r) = Recorded By, (t) = Taken By, (c) = Cosigned By      Initials Name Provider Type    AS Ruby Christine PTA Physical Therapist Assistant                   Obj/Interventions       Row Name 05/02/25 1033          Motor Skills    Therapeutic Exercise knee;ankle  -AS       Row  Name 05/02/25 1033          Knee (Therapeutic Exercise)    Knee (Therapeutic Exercise) strengthening exercise  -AS     Knee Strengthening (Therapeutic Exercise) bilateral;LAQ (long arc quad);sitting;10 repetitions  -AS       Row Name 05/02/25 1033          Ankle (Therapeutic Exercise)    Ankle (Therapeutic Exercise) AROM (active range of motion)  -AS     Ankle AROM (Therapeutic Exercise) bilateral;dorsiflexion;plantarflexion;sitting;10 repetitions  -AS       Row Name 05/02/25 1033          Balance    Dynamic Standing Balance verbal cues;minimal assist;1-person assist  -AS     Position/Device Used, Standing Balance supported;walker, front-wheeled  -AS     Comment, Balance min assist for safety  -AS               User Key  (r) = Recorded By, (t) = Taken By, (c) = Cosigned By      Initials Name Provider Type    AS Ruby Christine, PTA Physical Therapist Assistant                   Goals/Plan    No documentation.                  Clinical Impression       Row Name 05/02/25 1033          Pain    Pretreatment Pain Rating 7/10  -AS     Posttreatment Pain Rating 7/10  -AS     Pain Location extremity  -AS     Pain Side/Orientation bilateral;lower  -AS     Pain Management Interventions activity modification encouraged  -AS     Response to Pain Interventions activity and movement patterns unchanged  -AS       Row Name 05/02/25 1033          Plan of Care Review    Plan of Care Reviewed With patient  -AS     Progress no change  -AS     Outcome Evaluation Patient with decreased activity tolerance this date due to B LE pain(unna boots), increased rtime and effort to completed bed mobility and transfers, mobility limited by B LE weakness and pain. Completed B LE exercises and educated on completing on own throughout the day. Recommend home with asisst and HHPT at D/C when medically appropriate.  -AS       Row Name 05/02/25 1033          Positioning and Restraints    Pre-Treatment Position in bed  -AS     Post Treatment Position  chair  -AS     In Chair reclined;call light within reach;exit alarm on;encouraged to call for assist;waffle cushion;legs elevated  -AS               User Key  (r) = Recorded By, (t) = Taken By, (c) = Cosigned By      Initials Name Provider Type    AS Ruby Christine PTA Physical Therapist Assistant                   Outcome Measures       Row Name 05/02/25 1037          How much help from another person do you currently need...    Turning from your back to your side while in flat bed without using bedrails? 3  -AS     Moving from lying on back to sitting on the side of a flat bed without bedrails? 3  -AS     Moving to and from a bed to a chair (including a wheelchair)? 2  -AS     Standing up from a chair using your arms (e.g., wheelchair, bedside chair)? 2  -AS     Climbing 3-5 steps with a railing? 2  -AS     To walk in hospital room? 2  -AS     AM-PAC 6 Clicks Score (PT) 14  -AS     Highest Level of Mobility Goal 4 --> Transfer to chair/commode  -AS       Row Name 05/02/25 1037          Functional Assessment    Outcome Measure Options AM-PAC 6 Clicks Basic Mobility (PT)  -AS               User Key  (r) = Recorded By, (t) = Taken By, (c) = Cosigned By      Initials Name Provider Type    AS Ruby Christine PTA Physical Therapist Assistant                                 Physical Therapy Education       Title: PT OT SLP Therapies (In Progress)       Topic: Physical Therapy (In Progress)       Point: Mobility training (In Progress)       Learning Progress Summary            Patient Acceptance, E, NR by AS at 5/2/2025 1037    Acceptance, E,TB, VU by ES at 4/29/2025 1142                      Point: Home exercise program (In Progress)       Learning Progress Summary            Patient Acceptance, E, NR by AS at 5/2/2025 1037                      Point: Body mechanics (In Progress)       Learning Progress Summary            Patient Acceptance, E, NR by AS at 5/2/2025 1037    Acceptance, E,TB, VU by ES at  4/29/2025 1142                      Point: Precautions (In Progress)       Learning Progress Summary            Patient Acceptance, E, NR by AS at 5/2/2025 1037    Acceptance, E,TB, VU by ES at 4/29/2025 1142                                      User Key       Initials Effective Dates Name Provider Type Discipline    AS 12/13/24 -  Ruby Christine PTA Physical Therapist Assistant PT    ES 08/11/22 -  Malika Funk PT Physical Therapist PT                  PT Recommendation and Plan     Progress: no change  Outcome Evaluation: Patient with decreased activity tolerance this date due to B LE pain(unna boots), increased rtime and effort to completed bed mobility and transfers, mobility limited by B LE weakness and pain. Completed B LE exercises and educated on completing on own throughout the day. Recommend home with asisst and HHPT at D/C when medically appropriate.     Time Calculation:         PT Charges       Row Name 05/02/25 1037             Time Calculation    Start Time 1005  -AS      PT Received On 05/02/25  -AS      PT Goal Re-Cert Due Date 05/09/25  -AS         Timed Charges    58494 - PT Therapeutic Exercise Minutes 15  -AS      46374 - Gait Training Minutes  8  -AS         Total Minutes    Timed Charges Total Minutes 23  -AS       Total Minutes 23  -AS                User Key  (r) = Recorded By, (t) = Taken By, (c) = Cosigned By      Initials Name Provider Type    AS Ruby Christine PTA Physical Therapist Assistant                  Therapy Charges for Today       Code Description Service Date Service Provider Modifiers Qty    95864205134 HC PT THER PROC EA 15 MIN 5/2/2025 Ruby Christine PTA GP 1    11869174566 HC GAIT TRAINING EA 15 MIN 5/2/2025 Ruby Christine PTA GP 1            PT G-Codes  Outcome Measure Options: AM-PAC 6 Clicks Basic Mobility (PT)  AM-PAC 6 Clicks Score (PT): 14       Ruby Christine PTA  5/2/2025

## 2025-05-02 NOTE — CASE MANAGEMENT/SOCIAL WORK
"Continued Stay Note  [facility]     Patient Name: Jonatan Mcguire  MRN: [id number]  Today's Date: 5/2/2025    Admit Date: 4/26/2025    Plan: Home   Discharge Plan       Row Name 05/02/25 1050       Plan    Plan Home    Patient/Family in Agreement with Plan yes    Plan Comments I met w/Mr. Mcguire, he continues to decline HH services, feels that he will be discharged today. I informed that he would need HH for BLE wraps, he stated, \"I don't think I'm going home w/these.\" His d/c plan is home, family will transport. IMM collected and signed. No d/c needs expressed @ this time.    Final Discharge Disposition Code 01 - home or self-care                   Discharge Codes    No documentation.                 Expected Discharge Date and Time       Expected Discharge Date Expected Discharge Time    May 4, 2025               Tyler Jenkins RN    "

## 2025-05-02 NOTE — DISCHARGE SUMMARY
[facility] Medicine Services  DISCHARGE SUMMARY    Patient Name: Jonatan Mcguire  : 1950  MRN: [id number]    Date of Admission: 2025  4:25 PM  Date of Discharge:  2025  Primary Care Physician: Cheli Damico MD    Consults       Date and Time Order Name Status Description    2025  8:13 PM Inpatient Gastroenterology Consult Completed             Hospital Course     Presenting Problem: GI bleed    Active Hospital Problems    Diagnosis  POA    Chronic anticoagulation [Z79.01]  Not Applicable    Acute blood loss anemia [D62]  Unknown    COPD (chronic obstructive pulmonary disease) [J44.9]  Unknown    Afib [I48.91]  Unknown    Diabetes mellitus, type II [E11.9]  Unknown    Sleep apnea [G47.30]  Unknown    Hypertension [I10]  Unknown      Resolved Hospital Problems    Diagnosis Date Resolved POA    **GIB (gastrointestinal bleeding) [K92.2] 2025 Yes          Hospital Course:  Jonatan Mcguire is a 75 y.o. male with history of COPD, diabetes, hypertension, NEW, A-fib on Xarelto who presented with right parotid blood per rectum.  This hospital course he has required 3 units of packed red blood cells transfused.  CTA imaging was notable for active extravasation into the colon at the junction of the descending colon and sigmoid colon as well as multiple pancreatic cystic lesions.  GI followed and performed colonoscopy on .  No active bleeding was seen, diverticuli were present and prior bleeding presumed to be diverticular.  He was transferred out of the ICU to telemetry on      GI bleed  Diverticular bleed  - Colonoscopy on  showed normal ileum, diverticulosis in the sigmoid colon and distal descending colon and internal hemorrhoids.  GI suspects diverticular etiology of bleeding with resolution of bleeding at this time.  Resume diet, daily fiber.  Recommended to hold anticoagulation for at least 72 hours.  Needs repeat colonoscopy in 5 years for  surveillance  - Continue PPI  - Xarelto restarted 5/1. Hgb stable but low around transfusion parameters. Discussed benefit of transfusion, will order 1 unit.  Patient is adamant about leaving today, I discussed my recommendations of staying overnight to follow-up on H&H in the morning following the transfusion.  Since he insists on discharging today would recommend repeat CBC early next week and follow-up early next week with PCP     Pancreatic lesions  - Outpatient MRI pancreas protocol     A-fib   -Resumed Xarelto      COPD  NEW  - Continue home inhalers, O2 as needed     Hypertension  --stable off antihypertensives, would defer to outpatient setting to restart      Discharge Follow Up Recommendations for outpatient labs/diagnostics:   PCP    Day of Discharge     HPI:   No new overnight issues, asking to go home    Review of Systems  Gen- No fevers, chills  CV- No chest pain, palpitations  Resp- No cough, dyspnea  GI- No N/V/D, abd pain      Vital Signs:   Temp:  [98.3 °F (36.8 °C)-100.4 °F (38 °C)] 98.5 °F (36.9 °C)  Heart Rate:  [] 81  Resp:  [16-18] 16  BP: (102-128)/(57-84) 109/64  Flow (L/min) (Oxygen Therapy):  [2] 2      Physical Exam:  Constitutional: No acute distress, awake, alert  HENT: NCAT, mucous membranes moist  Respiratory: Clear to auscultation bilaterally, respiratory effort normal   Cardiovascular: RRR, no murmurs, rubs, or gallops  Gastrointestinal: Positive bowel sounds, soft, nontender, nondistended  Musculoskeletal: No bilateral ankle edema  Psychiatric: Appropriate affect, cooperative  Neurologic: Oriented x 3, strength symmetric in all extremities, Cranial Nerves grossly intact to confrontation, speech clear  Skin: No rashes       Pertinent  and/or Most Recent Results     LAB RESULTS:      Lab 05/02/25  0414 05/01/25  2329 05/01/25  1622 05/01/25  0419 05/01/25  0030 04/30/25  1902 04/30/25  0742 04/29/25  1355 04/29/25  0309 04/27/25  0955 04/27/25  0211 04/26/25  1905  04/26/25  1632   WBC 11.05*  --   --  10.41  --   --  9.67  --  10.35  --  9.03  --  10.30   HEMOGLOBIN 7.3* 7.4* 7.6* 7.1* 7.4*   < > 7.2*   < > 7.3*   < > 7.9*   < > 10.5*   HEMATOCRIT 24.3* 24.3* 25.2* 23.2* 24.8*   < > 23.4*   < > 23.5*   < > 26.6*   < > 35.0*   PLATELETS 183  --   --  155  --   --  143  --  131*  --  140  --  173   NEUTROS ABS 4.43  --   --   --   --   --  3.91  --   --   --   --   --  6.63   IMMATURE GRANS (ABS) 0.03  --   --   --   --   --  0.03  --   --   --   --   --  0.03   LYMPHS ABS 4.99*  --   --   --   --   --  4.39*  --   --   --   --   --  2.81   MONOS ABS 1.45*  --   --   --   --   --  1.21*  --   --   --   --   --  0.81   EOS ABS 0.12  --   --   --   --   --  0.12  --   --   --   --   --  0.01   MCV 96.4  --   --  95.5  --   --  95.5  --  94.0  --  96.4  --  94.1   LACTATE  --   --   --   --   --   --   --   --   --   --   --   --  1.7   PROTIME  --   --   --   --   --   --   --   --   --   --  17.6*  --  19.1*    < > = values in this interval not displayed.         Lab 05/02/25 0414 05/01/25 0419 04/30/25  0742 04/29/25  0309 04/28/25  0207 04/27/25  1516 04/27/25  0211 04/26/25  1632 04/26/25  1632   SODIUM  --  137 139 139 139  --  140  --  141   POTASSIUM  --  4.1 4.3 4.3 4.4  --  4.5  --  4.8   CHLORIDE  --  99 99 102 104  --  106  --  103   CO2  --  31.0* 31.0* 29.0 29.0  --  29.0  --  29.0   ANION GAP  --  7.0 9.0 8.0 6.0  --  5.0  --  9.0   BUN  --  10 8 13 14  --  19  --  22   CREATININE  --  0.87 0.67* 0.88 0.75*  --  0.82  --  0.92   EGFR  --  89.4 96.8 89.1 93.5  --  91.0  --  86.2   GLUCOSE  --  114* 83 80 82  --  84  --  166*   CALCIUM  --  7.9* 7.8* 7.5* 7.7*  --  7.8*  --  8.9   MAGNESIUM 1.9 1.8 1.7 1.4*  --  2.3 1.5*   < >  --    PHOSPHORUS  --  3.4 3.4 4.1  --   --  3.1  --   --    HEMOGLOBIN A1C  --   --   --   --   --   --   --   --  6.40*   TSH  --   --   --   --   --   --   --   --  1.110    < > = values in this interval not displayed.         Lab  04/26/25  1632   TOTAL PROTEIN 6.0   ALBUMIN 3.5   GLOBULIN 2.5   ALT (SGPT) 23   AST (SGOT) 24   BILIRUBIN 0.5   ALK PHOS 45         Lab 04/27/25  0211 04/26/25  1632   PROBNP  --  558.5   PROTIME 17.6* 19.1*   INR 1.36* 1.50*             Lab 04/26/25  2037 04/26/25  1634   ABO TYPING O O   RH TYPING Negative Negative   ANTIBODY SCREEN  --  Negative         Brief Urine Lab Results       None          Microbiology Results (last 10 days)       Procedure Component Value - Date/Time    Gastrointestinal Panel, PCR - Stool, Per Rectum [[id number]]  (Normal) Collected: 04/26/25 1803    Lab Status: Final result Specimen: Stool from Per Rectum Updated: 04/26/25 1938     Campylobacter Not Detected     Plesiomonas shigelloides Not Detected     Salmonella Not Detected     Vibrio Not Detected     Vibrio cholerae Not Detected     Yersinia enterocolitica Not Detected     Enteroaggregative E. coli (EAEC) Not Detected     Enteropathogenic E. coli (EPEC) Not Detected     Enterotoxigenic E. coli (ETEC) lt/st Not Detected     Shiga-like toxin-producing E. coli (STEC) stx1/stx2 Not Detected     Shigella/Enteroinvasive E. coli (EIEC) Not Detected     Cryptosporidium Not Detected     Cyclospora cayetanensis Not Detected     Entamoeba histolytica Not Detected     Giardia lamblia Not Detected     Adenovirus F40/41 Not Detected     Astrovirus Not Detected     Norovirus GI/GII Not Detected     Rotavirus A Not Detected     Sapovirus (I, II, IV or V) Not Detected            IR Artery Embolization Occlusion  Result Date: 4/27/2025  PROCEDURE: Mesenteric angiography  Procedural Personnel Attending physician(s): Johnathan Rodriguez  Pre-procedure diagnosis: GI bleeding Post-procedure diagnosis: Same Indication: Gastrointestinal bleeding Additional clinical history: Patient is on 20 mg Xarelto and has a history of a right partial hemicolectomy.  Complications: No immediate complications.       Angiography of the celiac, SMA, and JESSENIA demonstrates no  active extravasation.  _______________________________________________________________  PROCEDURE SUMMARY: - Arterial access with ultrasound guidance - Selective mesenteric angiography: Celiac, superior mesenteric, and inferior mesenteric angiography - Superselective mesenteric angiography: Performed as described below - Additional procedure(s): Right internal iliac angiography  PROCEDURE DETAILS:  Pre-procedure Consent: Informed consent for the procedure including risks, benefits and alternatives was obtained and time-out was performed prior to the procedure. Preparation: The site was prepared and draped using maximal sterile barrier technique including cutaneous antisepsis.  Anesthesia/sedation Level of anesthesia/sedation: Moderate sedation (conscious sedation) Anesthesia/sedation administered by: Independent trained observer under attending supervision with continuous monitoring of the patient?s level of consciousness and physiologic status Total intra-service sedation time (minutes): 78  Access Local anesthesia was administered. The vessel was sonographically evaluated and judged to be patent. Real time ultrasound was used to visualize needle entry into the vessel and a permanent image was not stored. A 6 Sammarinese sheath was placed. Vessel accessed: Right common femoral artery Access technique: Micropuncture set with 21 gauge needle  Mesenteric angiography The mesenteric arterial system was catheterized using a 5 Sammarinese reverse curve catheter.  Variant anatomy: None  Vessel catheterized: Celiac artery Findings: No active extravasation.  Vessel catheterized: Superior mesenteric artery Findings: No active extravasation  Vessel catheterized: Multiple branches of the superior mesenteric artery were interrogated including 3 which perfuse the area of interest seen on recent CTA. Findings: Angiography was performed. No active extravasation was noted. No embolization was performed.  Vessel catheterized: Inferior  mesenteric artery Findings: No active extravasation. Unfortunately this angiographic run was not permanently stored.  Closure Access site angiography performed: Yes Findings: Patent vessel with appropriate access level Arterial closure technique: Angioseal Hemostasis achieved from closure technique: Yes Duration of manual compression (minutes): 2  Contrast Contrast agent: Visipaque 320 Contrast volume (mL): 65  Radiation Dose Fluoroscopy time (minutes): 7.6   Additional Details Additional description of procedure: None Equipment details: None Specimens removed: None Estimated blood loss (mL): 20      4/27/2025 12:03 AM by Johnathan Rodriguez MD on Workstation: [id number]      CT Angiogram Abdomen Pelvis  Result Date: 4/26/2025  CT ANGIOGRAM ABDOMEN PELVIS Date of Exam: 4/26/2025 6:34 PM EDT Indication: Brisk bright red blood per rectum GI bleed protocol. Comparison: None available. Technique: CTA of the abdomen and pelvis was performed after the uneventful intravenous administration of 100 mL Isovue-370. Reconstructed coronal and sagittal images were also obtained. In addition, a 3-D volume rendered image was created for interpretation. Automated exposure control and iterative reconstruction methods were used. Findings: Partial atelectasis in the included right lower lobe. Cardiomegaly. Right gynecomastia included in the lower chest. Cholelithiasis without pericholecystic inflammation. Punctate nonobstructing left nephrolithiasis. 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 Bilateral renal cysts. Adrenal glands, spleen, liver appear unremarkable. No abnormal bowel distention. Extravasation of contrast is seen into the colon at the junction of the descending colon and sigmoid colon (image 106, series 5, image 103, series 7). There is sigmoid colon diverticulosis. No abdominal aortic aneurysm or dissection. Duplicated renal arteries. Urinary bladder is not well distended. No significant free fluid or  lymphadenopathy. Multilevel degenerative changes in the lumbar spine. No acute osseous abnormality is identified.     Impression: 1.Extravasation of contrast into the colon at the junction of the descending colon and sigmoid colon, consistent with history of GI bleed. 2.Multiple pancreatic cystic lesions, measuring up to 3.7 cm, which can be further evaluated with outpatient MRI pancreas protocol. 3.Cholelithiasis. Electronically Signed: Mirna Roberts MD  4/26/2025 7:06 PM EDT  Workstation ID: [id number]                  Plan for Follow-up of Pending Labs/Results: no pending results    Discharge Details        Discharge Medications        New Medications        Instructions Start Date   pantoprazole 40 MG EC tablet  Commonly known as: PROTONIX   40 mg, Oral, 2 Times Daily Before Meals      Psyllium 51.7 % packet  Commonly known as: METAMUCIL MULTIHEALTH FIBER   1 packet, Oral, Daily   Start Date: May 3, 2025            Continue These Medications        Instructions Start Date   acetaminophen 500 MG tablet  Commonly known as: TYLENOL   500 mg, Every 6 Hours PRN      Advair Diskus 250-50 MCG/ACT DISKUS  Generic drug: Fluticasone-Salmeterol   2 puffs, 2 Times Daily - RT      albuterol sulfate  (90 Base) MCG/ACT inhaler  Commonly known as: PROVENTIL HFA;VENTOLIN HFA;PROAIR HFA   1 puff, Every 4 Hours PRN      atorvastatin 10 MG tablet  Commonly known as: LIPITOR   10 mg, Oral, Daily      finasteride 5 MG tablet  Commonly known as: PROSCAR   5 mg, Oral, Daily      glipizide 5 MG ER tablet  Commonly known as: GLUCOTROL XL   5 mg, Oral, Daily      insulin glargine 100 UNIT/ML injection  Commonly known as: LANTUS, SEMGLEE   17 Units, Nightly      levalbuterol 1.25 MG/3ML nebulizer solution  Commonly known as: XOPENEX   1 ampule, Nebulization, Every 4 Hours PRN      montelukast 10 MG tablet  Commonly known as: SINGULAIR   10 mg, Nightly      Prenatal 27-1 27-1 MG tablet tablet   1 tablet, Daily      rivaroxaban 20 MG  tablet  Commonly known as: XARELTO   20 mg, Daily With Dinner      tamsulosin 0.4 MG capsule 24 hr capsule  Commonly known as: FLOMAX   1 capsule, Oral, Daily      tiotropium 18 MCG per inhalation capsule  Commonly known as: SPIRIVA   1 capsule, Daily - RT             Stop These Medications      dilTIAZem  MG 24 hr capsule  Commonly known as: CARDIZEM CD     doxazosin 8 MG tablet  Commonly known as: CARDURA     hydrALAZINE 100 MG tablet  Commonly known as: APRESOLINE     lisinopril-hydrochlorothiazide 20-25 MG per tablet  Commonly known as: PRINZIDE,ZESTORETIC     spironolactone 25 MG tablet  Commonly known as: ALDACTONE              Allergies   Allergen Reactions    Tetanus Toxoids Unknown - Low Severity         Discharge Disposition:      Diet:  Hospital:  Diet Order   Procedures    Diet: Cardiac, Diabetic; Healthy Heart (2-3 Na+); Consistent Carbohydrate; Fluid Consistency: Thin (IDDSI 0)            Activity:  as tolerated    Restrictions or Other Recommendations:  none       CODE STATUS:    Code Status and Medical Interventions: CPR (Attempt to Resuscitate); Full Support   Ordered at: 04/26/25 2013     Code Status (Patient has no pulse and is not breathing):    CPR (Attempt to Resuscitate)     Medical Interventions (Patient has pulse or is breathing):    Full Support       No future appointments.    Additional Instructions for the Follow-ups that You Need to Schedule       CBC & Differential    May 07, 2025 (Approximate)      Manual Differential: No   Release to patient: Routine Release                      Anne Cha MD  05/02/25      Time Spent on Discharge:  I spent  45  minutes on this discharge activity which included: face-to-face encounter with the patient, reviewing the data in the system, coordination of the care with the nursing staff as well as consultants, documentation, and entering orders.

## 2025-05-03 ENCOUNTER — READMISSION MANAGEMENT (OUTPATIENT)
Dept: CALL CENTER | Facility: HOSPITAL | Age: 75
End: 2025-05-03
Payer: MEDICARE

## 2025-05-03 LAB
BH BB BLOOD EXPIRATION DATE: NORMAL
BH BB BLOOD TYPE BARCODE: 9500
BH BB DISPENSE STATUS: NORMAL
BH BB PRODUCT CODE: NORMAL
BH BB UNIT NUMBER: NORMAL
CROSSMATCH INTERPRETATION: NORMAL
UNIT  ABO: NORMAL
UNIT  RH: NORMAL

## 2025-05-03 NOTE — OUTREACH NOTE
Prep Survey      Flowsheet Row Responses   [facility] facility patient discharged from? [facility]   Is LACE score < 7 ? No   Eligibility Readm Mgmt   Discharge diagnosis GIB (gastrointestinal bleeding   Does the patient have one of the following disease processes/diagnoses(primary or secondary)? Other   Does the patient have Home health ordered? No   Is there a DME ordered? No   Prep survey completed? Yes            TY STARR - Registered Nurse

## 2025-05-08 ENCOUNTER — READMISSION MANAGEMENT (OUTPATIENT)
Dept: CALL CENTER | Facility: HOSPITAL | Age: 75
End: 2025-05-08
Payer: MEDICARE

## 2025-05-08 NOTE — OUTREACH NOTE
Medical Week 1 Survey      Flowsheet Row Responses   [facility] patient discharged from? [facility]   Does the patient have one of the following disease processes/diagnoses(primary or secondary)? Other   Week 1 attempt successful? Yes   Call start time 1750   Call end time 1757   Discharge diagnosis GIB (gastrointestinal bleeding   Meds reviewed with patient/caregiver? Yes   Is the patient having any side effects they believe may be caused by any medication additions or changes? No   Does the patient have all medications ordered at discharge? Yes   Is the patient taking all medications as directed (includes completed medication regime)? Yes   Does the patient have a primary care provider?  Yes   Does the patient have an appointment with their PCP within 7 days of discharge? Yes   Has the patient kept scheduled appointments due by today? Yes   Has home health visited the patient within 72 hours of discharge? N/A   Psychosocial issues? No   Did the patient receive a copy of their discharge instructions? Yes   Nursing interventions Reviewed instructions with patient   What is the patient's perception of their health status since discharge? Improving   Is the patient/caregiver able to teach back signs and symptoms related to disease process for when to call PCP? Yes   Is the patient/caregiver able to teach back signs and symptoms related to disease process for when to call 911? Yes   Is the patient/caregiver able to teach back the hierarchy of who to call/visit for symptoms/problems? PCP, Specialist, Home health nurse, Urgent Care, ED, 911 Yes   If the patient is a current smoker, are they able to teach back resources for cessation? Not a smoker   Additional teach back comments states having some constipation, taking Miralax, prune juice and stool softeners suggested, states would try, denies blood in stool   Week 1 call completed? Yes   Call end time 1757            Rosemary HILTON - Registered Nurse

## 2025-05-19 ENCOUNTER — READMISSION MANAGEMENT (OUTPATIENT)
Dept: CALL CENTER | Facility: HOSPITAL | Age: 75
End: 2025-05-19
Payer: MEDICARE

## 2025-05-19 NOTE — OUTREACH NOTE
Medical Week 2 Survey      Flowsheet Row Responses   [facility] patient discharged from? [facility]   Does the patient have one of the following disease processes/diagnoses(primary or secondary)? Other   Week 2 attempt successful? No   Unsuccessful attempts Attempt 1   oke Jahaira Kaplan Registered Nurse

## 2025-06-09 ENCOUNTER — APPOINTMENT (OUTPATIENT)
Dept: GENERAL RADIOLOGY | Facility: HOSPITAL | Age: 75
DRG: 291 | End: 2025-06-09
Payer: MEDICARE

## 2025-06-09 ENCOUNTER — APPOINTMENT (OUTPATIENT)
Dept: CARDIOLOGY | Facility: HOSPITAL | Age: 75
DRG: 291 | End: 2025-06-09
Payer: MEDICARE

## 2025-06-09 ENCOUNTER — HOSPITAL ENCOUNTER (INPATIENT)
Facility: HOSPITAL | Age: 75
LOS: 4 days | Discharge: HOME | DRG: 291 | End: 2025-06-13
Attending: EMERGENCY MEDICINE | Admitting: INTERNAL MEDICINE
Payer: MEDICARE

## 2025-06-09 DIAGNOSIS — R82.71 BACTERIURIA WITH PYURIA: ICD-10-CM

## 2025-06-09 DIAGNOSIS — Z79.01 CHRONIC ANTICOAGULATION: ICD-10-CM

## 2025-06-09 DIAGNOSIS — I50.9 CONGESTIVE HEART FAILURE, UNSPECIFIED HF CHRONICITY, UNSPECIFIED HEART FAILURE TYPE: ICD-10-CM

## 2025-06-09 DIAGNOSIS — I87.2 STASIS DERMATITIS OF BOTH LEGS: ICD-10-CM

## 2025-06-09 DIAGNOSIS — I48.20 CHRONIC ATRIAL FIBRILLATION: ICD-10-CM

## 2025-06-09 DIAGNOSIS — D64.9 CHRONIC ANEMIA: ICD-10-CM

## 2025-06-09 DIAGNOSIS — R60.0 PERIPHERAL EDEMA: ICD-10-CM

## 2025-06-09 DIAGNOSIS — I50.9 ACUTE ON CHRONIC CONGESTIVE HEART FAILURE, UNSPECIFIED HEART FAILURE TYPE: Primary | ICD-10-CM

## 2025-06-09 DIAGNOSIS — R82.81 BACTERIURIA WITH PYURIA: ICD-10-CM

## 2025-06-09 LAB
ALBUMIN SERPL-MCNC: 3.5 G/DL (ref 3.5–5.2)
ALBUMIN/GLOB SERPL: 1.3 G/DL
ALP SERPL-CCNC: 49 U/L (ref 39–117)
ALT SERPL W P-5'-P-CCNC: 20 U/L (ref 1–41)
AMPHET+METHAMPHET UR QL: NEGATIVE
AMPHETAMINES UR QL: NEGATIVE
ANION GAP SERPL CALCULATED.3IONS-SCNC: 11 MMOL/L (ref 5–15)
AORTIC DIMENSIONLESS INDEX: 0.42 (DI)
ASCENDING AORTA: 3.5 CM
AST SERPL-CCNC: 25 U/L (ref 1–40)
ATMOSPHERIC PRESS: ABNORMAL MM[HG]
AV MEAN PRESS GRAD SYS DOP V1V2: 14.3 MMHG
AV VMAX SYS DOP: 259.2 CM/SEC
BACTERIA UR QL AUTO: ABNORMAL /HPF
BARBITURATES UR QL SCN: NEGATIVE
BASE EXCESS BLDV CALC-SCNC: 9.5 MMOL/L (ref -2–2)
BASOPHILS # BLD AUTO: 0.02 10*3/MM3 (ref 0–0.2)
BASOPHILS NFR BLD AUTO: 0.3 % (ref 0–1.5)
BDY SITE: ABNORMAL
BENZODIAZ UR QL SCN: NEGATIVE
BH CV ECHO MEAS - AO MAX PG: 27 MMHG
BH CV ECHO MEAS - AO ROOT DIAM: 3.4 CM
BH CV ECHO MEAS - AO V2 VTI: 52.2 CM
BH CV ECHO MEAS - AVA(I,D): 1.49 CM2
BH CV ECHO MEAS - EDV(CUBED): 138.1 ML
BH CV ECHO MEAS - EDV(MOD-SP2): 203 ML
BH CV ECHO MEAS - EDV(MOD-SP4): 135 ML
BH CV ECHO MEAS - EF(MOD-SP2): 65.8 %
BH CV ECHO MEAS - EF(MOD-SP4): 69 %
BH CV ECHO MEAS - ESV(CUBED): 20.5 ML
BH CV ECHO MEAS - ESV(MOD-SP2): 69.4 ML
BH CV ECHO MEAS - ESV(MOD-SP4): 41.9 ML
BH CV ECHO MEAS - FS: 47.1 %
BH CV ECHO MEAS - IVS/LVPW: 1.01 CM
BH CV ECHO MEAS - IVSD: 1.29 CM
BH CV ECHO MEAS - LA DIMENSION: 4.9 CM
BH CV ECHO MEAS - LAT PEAK E' VEL: 9.4 CM/SEC
BH CV ECHO MEAS - LV DIASTOLIC VOL/BSA (35-75): 59.2 CM2
BH CV ECHO MEAS - LV MASS(C)D: 271.7 GRAMS
BH CV ECHO MEAS - LV MAX PG: 4.5 MMHG
BH CV ECHO MEAS - LV MEAN PG: 2.31 MMHG
BH CV ECHO MEAS - LV SYSTOLIC VOL/BSA (12-30): 18.4 CM2
BH CV ECHO MEAS - LV V1 MAX: 105.5 CM/SEC
BH CV ECHO MEAS - LV V1 VTI: 21.7 CM
BH CV ECHO MEAS - LVIDD: 5.2 CM
BH CV ECHO MEAS - LVIDS: 2.7 CM
BH CV ECHO MEAS - LVOT AREA: 3.6 CM2
BH CV ECHO MEAS - LVOT DIAM: 2.13 CM
BH CV ECHO MEAS - LVPWD: 1.28 CM
BH CV ECHO MEAS - MED PEAK E' VEL: 7.7 CM/SEC
BH CV ECHO MEAS - MV DEC SLOPE: 771.4 CM/SEC2
BH CV ECHO MEAS - MV DEC TIME: 0.17 SEC
BH CV ECHO MEAS - MV E MAX VEL: 166 CM/SEC
BH CV ECHO MEAS - MV MAX PG: 11.1 MMHG
BH CV ECHO MEAS - MV MEAN PG: 4.7 MMHG
BH CV ECHO MEAS - MV P1/2T: 62.8 MSEC
BH CV ECHO MEAS - MV V2 VTI: 34.3 CM
BH CV ECHO MEAS - MVA(P1/2T): 3.5 CM2
BH CV ECHO MEAS - MVA(VTI): 2.27 CM2
BH CV ECHO MEAS - PA ACC TIME: 0.07 SEC
BH CV ECHO MEAS - RAP SYSTOLE: 15 MMHG
BH CV ECHO MEAS - RVSP: 55 MMHG
BH CV ECHO MEAS - SV(LVOT): 77.8 ML
BH CV ECHO MEAS - SV(MOD-SP2): 133.6 ML
BH CV ECHO MEAS - SV(MOD-SP4): 93.1 ML
BH CV ECHO MEAS - SVI(LVOT): 34.1 ML/M2
BH CV ECHO MEAS - SVI(MOD-SP2): 58.6 ML/M2
BH CV ECHO MEAS - SVI(MOD-SP4): 40.9 ML/M2
BH CV ECHO MEAS - TAPSE (>1.6): 2.34 CM
BH CV ECHO MEAS - TR MAX PG: 39.5 MMHG
BH CV ECHO MEAS - TR MAX VEL: 275.7 CM/SEC
BH CV ECHO MEASUREMENTS AVERAGE E/E' RATIO: 19.42
BH CV XLRA - RV BASE: 4.6 CM
BH CV XLRA - RV LENGTH: 7.5 CM
BH CV XLRA - RV MID: 2.8 CM
BH CV XLRA - TDI S': 10.7 CM/SEC
BILIRUB SERPL-MCNC: 0.5 MG/DL (ref 0–1.2)
BILIRUB UR QL STRIP: NEGATIVE
BODY TEMPERATURE: 37
BUN SERPL-MCNC: 16.6 MG/DL (ref 8–23)
BUN/CREAT SERPL: 18 (ref 7–25)
BUPRENORPHINE SERPL-MCNC: NEGATIVE NG/ML
CALCIUM SPEC-SCNC: 8.9 MG/DL (ref 8.6–10.5)
CANNABINOIDS SERPL QL: NEGATIVE
CHLORIDE SERPL-SCNC: 103 MMOL/L (ref 98–107)
CHOLEST SERPL-MCNC: 97 MG/DL (ref 0–200)
CK SERPL-CCNC: 157 U/L (ref 20–200)
CLARITY UR: CLEAR
CO2 BLDA-SCNC: 37.3 MMOL/L (ref 22–33)
CO2 SERPL-SCNC: 29 MMOL/L (ref 22–29)
COCAINE UR QL: NEGATIVE
COHGB MFR BLD: 1.6 %
COLOR UR: YELLOW
CREAT SERPL-MCNC: 0.92 MG/DL (ref 0.76–1.27)
CRP SERPL-MCNC: 1.06 MG/DL (ref 0–0.5)
D DIMER PPP FEU-MCNC: 1.35 MCGFEU/ML (ref 0–0.76)
D-LACTATE SERPL-SCNC: 1.4 MMOL/L (ref 0.5–2)
DEPRECATED RDW RBC AUTO: 60 FL (ref 37–54)
EGFRCR SERPLBLD CKD-EPI 2021: 86.2 ML/MIN/1.73
EOSINOPHIL # BLD AUTO: 0.03 10*3/MM3 (ref 0–0.4)
EOSINOPHIL NFR BLD AUTO: 0.4 % (ref 0.3–6.2)
EPAP: 0
ERYTHROCYTE [DISTWIDTH] IN BLOOD BY AUTOMATED COUNT: 17.3 % (ref 12.3–15.4)
FENTANYL UR-MCNC: NEGATIVE NG/ML
FERRITIN SERPL-MCNC: 37.3 NG/ML (ref 30–400)
GEN 5 1HR TROPONIN T REFLEX: 142 NG/L
GLOBULIN UR ELPH-MCNC: 2.8 GM/DL
GLUCOSE BLDC GLUCOMTR-MCNC: 78 MG/DL (ref 70–130)
GLUCOSE SERPL-MCNC: 87 MG/DL (ref 65–99)
GLUCOSE UR STRIP-MCNC: NEGATIVE MG/DL
HBA1C MFR BLD: 5.3 % (ref 4.8–5.6)
HCO3 BLDV-SCNC: 35.6 MMOL/L (ref 22–28)
HCT VFR BLD AUTO: 33.5 % (ref 37.5–51)
HDLC SERPL-MCNC: 54 MG/DL (ref 40–60)
HGB BLD-MCNC: 9.5 G/DL (ref 13–17.7)
HGB BLDA-MCNC: 10.3 G/DL (ref 13.5–17.5)
HGB UR QL STRIP.AUTO: NEGATIVE
HOLD SPECIMEN: NORMAL
HYALINE CASTS UR QL AUTO: ABNORMAL /LPF
IMM GRANULOCYTES # BLD AUTO: 0.03 10*3/MM3 (ref 0–0.05)
IMM GRANULOCYTES NFR BLD AUTO: 0.4 % (ref 0–0.5)
INHALED O2 CONCENTRATION: 28 %
INR PPP: 2.07 (ref 0.89–1.12)
IPAP: 0
IRON 24H UR-MRATE: 15 MCG/DL (ref 59–158)
IRON SATN MFR SERPL: 4 % (ref 20–50)
IVRT: 74 MS
KETONES UR QL STRIP: ABNORMAL
LDLC SERPL CALC-MCNC: 30 MG/DL (ref 0–100)
LDLC/HDLC SERPL: 0.6 {RATIO}
LEFT ATRIUM VOLUME INDEX: 53.9 ML/M2
LEUKOCYTE ESTERASE UR QL STRIP.AUTO: ABNORMAL
LV EF BIPLANE MOD: 67.2 %
LYMPHOCYTES # BLD AUTO: 1.04 10*3/MM3 (ref 0.7–3.1)
LYMPHOCYTES NFR BLD AUTO: 13.6 % (ref 19.6–45.3)
MCH RBC QN AUTO: 26.6 PG (ref 26.6–33)
MCHC RBC AUTO-ENTMCNC: 28.4 G/DL (ref 31.5–35.7)
MCV RBC AUTO: 93.8 FL (ref 79–97)
METHADONE UR QL SCN: NEGATIVE
METHGB BLD QL: 0.5 %
MODALITY: ABNORMAL
MONOCYTES # BLD AUTO: 0.53 10*3/MM3 (ref 0.1–0.9)
MONOCYTES NFR BLD AUTO: 6.9 % (ref 5–12)
NEUTROPHILS NFR BLD AUTO: 5.98 10*3/MM3 (ref 1.7–7)
NEUTROPHILS NFR BLD AUTO: 78.4 % (ref 42.7–76)
NITRITE UR QL STRIP: NEGATIVE
NRBC BLD AUTO-RTO: 0 /100 WBC (ref 0–0.2)
NT-PROBNP SERPL-MCNC: 1415 PG/ML (ref 0–1800)
OPIATES UR QL: NEGATIVE
OXYCODONE UR QL SCN: NEGATIVE
OXYHGB MFR BLDV: 63.2 %
PAW @ PEAK INSP FLOW SETTING VENT: 0 CMH2O
PCO2 BLDV: 55.8 MM HG (ref 41–51)
PCP UR QL SCN: NEGATIVE
PH BLDV: 7.41 PH UNITS (ref 7.31–7.41)
PH UR STRIP.AUTO: 6 [PH] (ref 5–8)
PLATELET # BLD AUTO: 218 10*3/MM3 (ref 140–450)
PMV BLD AUTO: 9.5 FL (ref 6–12)
PO2 BLDV: 35.2 MM HG (ref 27–53)
POTASSIUM SERPL-SCNC: 3.8 MMOL/L (ref 3.5–5.2)
PROCALCITONIN SERPL-MCNC: 0.07 NG/ML (ref 0–0.25)
PROT SERPL-MCNC: 6.3 G/DL (ref 6–8.5)
PROT UR QL STRIP: NEGATIVE
PROTHROMBIN TIME: 24.7 SECONDS (ref 12.2–15.3)
QT INTERVAL: 378 MS
QT INTERVAL: 394 MS
QTC INTERVAL: 435 MS
QTC INTERVAL: 474 MS
RBC # BLD AUTO: 3.57 10*6/MM3 (ref 4.14–5.8)
RBC # UR STRIP: ABNORMAL /HPF
REF LAB TEST METHOD: ABNORMAL
SODIUM SERPL-SCNC: 143 MMOL/L (ref 136–145)
SP GR UR STRIP: 1.01 (ref 1–1.03)
SQUAMOUS #/AREA URNS HPF: ABNORMAL /HPF
TIBC SERPL-MCNC: 392 MCG/DL (ref 298–536)
TOTAL RATE: 0 BREATHS/MINUTE
TRANSFERRIN SERPL-MCNC: 263 MG/DL (ref 200–360)
TRICYCLICS UR QL SCN: NEGATIVE
TRIGL SERPL-MCNC: 52 MG/DL (ref 0–150)
TROPONIN T % DELTA: -1
TROPONIN T NUMERIC DELTA: -2 NG/L
TROPONIN T SERPL HS-MCNC: 144 NG/L
TSH SERPL DL<=0.05 MIU/L-ACNC: 1.59 UIU/ML (ref 0.27–4.2)
UROBILINOGEN UR QL STRIP: ABNORMAL
VLDLC SERPL-MCNC: 13 MG/DL (ref 5–40)
WBC # UR STRIP: ABNORMAL /HPF
WBC NRBC COR # BLD AUTO: 7.63 10*3/MM3 (ref 3.4–10.8)
WHOLE BLOOD HOLD COAG: NORMAL
WHOLE BLOOD HOLD SPECIMEN: NORMAL

## 2025-06-09 PROCEDURE — 25010000002 CEFAZOLIN PER 500 MG: Performed by: EMERGENCY MEDICINE

## 2025-06-09 PROCEDURE — 84145 PROCALCITONIN (PCT): CPT

## 2025-06-09 PROCEDURE — 83036 HEMOGLOBIN GLYCOSYLATED A1C: CPT

## 2025-06-09 PROCEDURE — 93306 TTE W/DOPPLER COMPLETE: CPT

## 2025-06-09 PROCEDURE — 82948 REAGENT STRIP/BLOOD GLUCOSE: CPT

## 2025-06-09 PROCEDURE — 93306 TTE W/DOPPLER COMPLETE: CPT | Performed by: INTERNAL MEDICINE

## 2025-06-09 PROCEDURE — 82805 BLOOD GASES W/O2 SATURATION: CPT

## 2025-06-09 PROCEDURE — 25010000002 BUMETANIDE PER 0.5 MG: Performed by: EMERGENCY MEDICINE

## 2025-06-09 PROCEDURE — 87086 URINE CULTURE/COLONY COUNT: CPT | Performed by: EMERGENCY MEDICINE

## 2025-06-09 PROCEDURE — 87186 SC STD MICRODIL/AGAR DIL: CPT | Performed by: EMERGENCY MEDICINE

## 2025-06-09 PROCEDURE — 82550 ASSAY OF CK (CPK): CPT

## 2025-06-09 PROCEDURE — 85379 FIBRIN DEGRADATION QUANT: CPT

## 2025-06-09 PROCEDURE — 94799 UNLISTED PULMONARY SVC/PX: CPT

## 2025-06-09 PROCEDURE — 82728 ASSAY OF FERRITIN: CPT

## 2025-06-09 PROCEDURE — 93005 ELECTROCARDIOGRAM TRACING: CPT

## 2025-06-09 PROCEDURE — 71045 X-RAY EXAM CHEST 1 VIEW: CPT

## 2025-06-09 PROCEDURE — 25010000002 SULFUR HEXAFLUORIDE MICROSPH 60.7-25 MG RECONSTITUTED SUSPENSION: Performed by: NURSE PRACTITIONER

## 2025-06-09 PROCEDURE — 80307 DRUG TEST PRSMV CHEM ANLYZR: CPT

## 2025-06-09 PROCEDURE — 99223 1ST HOSP IP/OBS HIGH 75: CPT

## 2025-06-09 PROCEDURE — 63710000001 INSULIN GLARGINE PER 5 UNITS

## 2025-06-09 PROCEDURE — 94640 AIRWAY INHALATION TREATMENT: CPT

## 2025-06-09 PROCEDURE — 84443 ASSAY THYROID STIM HORMONE: CPT

## 2025-06-09 PROCEDURE — 80061 LIPID PANEL: CPT

## 2025-06-09 PROCEDURE — 36415 COLL VENOUS BLD VENIPUNCTURE: CPT

## 2025-06-09 PROCEDURE — 99285 EMERGENCY DEPT VISIT HI MDM: CPT

## 2025-06-09 PROCEDURE — 80053 COMPREHEN METABOLIC PANEL: CPT | Performed by: EMERGENCY MEDICINE

## 2025-06-09 PROCEDURE — 81001 URINALYSIS AUTO W/SCOPE: CPT | Performed by: EMERGENCY MEDICINE

## 2025-06-09 PROCEDURE — 83540 ASSAY OF IRON: CPT

## 2025-06-09 PROCEDURE — 85025 COMPLETE CBC W/AUTO DIFF WBC: CPT | Performed by: EMERGENCY MEDICINE

## 2025-06-09 PROCEDURE — 93005 ELECTROCARDIOGRAM TRACING: CPT | Performed by: EMERGENCY MEDICINE

## 2025-06-09 PROCEDURE — 87077 CULTURE AEROBIC IDENTIFY: CPT | Performed by: EMERGENCY MEDICINE

## 2025-06-09 PROCEDURE — 83605 ASSAY OF LACTIC ACID: CPT

## 2025-06-09 PROCEDURE — 84466 ASSAY OF TRANSFERRIN: CPT

## 2025-06-09 PROCEDURE — 83880 ASSAY OF NATRIURETIC PEPTIDE: CPT | Performed by: EMERGENCY MEDICINE

## 2025-06-09 PROCEDURE — 63710000001 PREDNISONE PER 5 MG

## 2025-06-09 PROCEDURE — 86140 C-REACTIVE PROTEIN: CPT

## 2025-06-09 PROCEDURE — 85610 PROTHROMBIN TIME: CPT

## 2025-06-09 PROCEDURE — 84484 ASSAY OF TROPONIN QUANT: CPT | Performed by: EMERGENCY MEDICINE

## 2025-06-09 PROCEDURE — 99222 1ST HOSP IP/OBS MODERATE 55: CPT | Performed by: INTERNAL MEDICINE

## 2025-06-09 RX ORDER — ACETAMINOPHEN 160 MG/5ML
650 SOLUTION ORAL EVERY 4 HOURS PRN
Status: DISCONTINUED | OUTPATIENT
Start: 2025-06-09 | End: 2025-06-13 | Stop reason: HOSPADM

## 2025-06-09 RX ORDER — SODIUM CHLORIDE 0.9 % (FLUSH) 0.9 %
10 SYRINGE (ML) INJECTION AS NEEDED
Status: DISCONTINUED | OUTPATIENT
Start: 2025-06-09 | End: 2025-06-09

## 2025-06-09 RX ORDER — NICOTINE POLACRILEX 4 MG
15 LOZENGE BUCCAL
Status: DISCONTINUED | OUTPATIENT
Start: 2025-06-09 | End: 2025-06-13 | Stop reason: HOSPADM

## 2025-06-09 RX ORDER — BISACODYL 5 MG/1
5 TABLET, DELAYED RELEASE ORAL DAILY PRN
Status: DISCONTINUED | OUTPATIENT
Start: 2025-06-09 | End: 2025-06-13 | Stop reason: HOSPADM

## 2025-06-09 RX ORDER — SPIRONOLACTONE 25 MG/1
12.5 TABLET ORAL DAILY
COMMUNITY
End: 2025-06-13 | Stop reason: HOSPADM

## 2025-06-09 RX ORDER — SPIRONOLACTONE 25 MG/1
25 TABLET ORAL DAILY
Status: DISCONTINUED | OUTPATIENT
Start: 2025-06-09 | End: 2025-06-13 | Stop reason: HOSPADM

## 2025-06-09 RX ORDER — DEXTROSE MONOHYDRATE 25 G/50ML
25 INJECTION, SOLUTION INTRAVENOUS
Status: DISCONTINUED | OUTPATIENT
Start: 2025-06-09 | End: 2025-06-13 | Stop reason: HOSPADM

## 2025-06-09 RX ORDER — ATORVASTATIN CALCIUM 10 MG/1
10 TABLET, FILM COATED ORAL DAILY
Status: DISCONTINUED | OUTPATIENT
Start: 2025-06-09 | End: 2025-06-13 | Stop reason: HOSPADM

## 2025-06-09 RX ORDER — DILTIAZEM HYDROCHLORIDE 180 MG/1
180 CAPSULE, COATED, EXTENDED RELEASE ORAL DAILY
COMMUNITY
End: 2025-06-13 | Stop reason: HOSPADM

## 2025-06-09 RX ORDER — ATORVASTATIN CALCIUM 10 MG/1
10 TABLET, FILM COATED ORAL DAILY
Status: DISCONTINUED | OUTPATIENT
Start: 2025-06-09 | End: 2025-06-09

## 2025-06-09 RX ORDER — TAMSULOSIN HYDROCHLORIDE 0.4 MG/1
0.4 CAPSULE ORAL DAILY
Status: DISCONTINUED | OUTPATIENT
Start: 2025-06-09 | End: 2025-06-13 | Stop reason: HOSPADM

## 2025-06-09 RX ORDER — ALBUTEROL SULFATE 0.83 MG/ML
2.5 SOLUTION RESPIRATORY (INHALATION) EVERY 4 HOURS PRN
Status: DISCONTINUED | OUTPATIENT
Start: 2025-06-09 | End: 2025-06-13 | Stop reason: HOSPADM

## 2025-06-09 RX ORDER — SODIUM CHLORIDE 9 MG/ML
40 INJECTION, SOLUTION INTRAVENOUS AS NEEDED
Status: DISCONTINUED | OUTPATIENT
Start: 2025-06-09 | End: 2025-06-13 | Stop reason: HOSPADM

## 2025-06-09 RX ORDER — BISACODYL 10 MG
10 SUPPOSITORY, RECTAL RECTAL DAILY PRN
Status: DISCONTINUED | OUTPATIENT
Start: 2025-06-09 | End: 2025-06-13 | Stop reason: HOSPADM

## 2025-06-09 RX ORDER — IBUPROFEN 600 MG/1
1 TABLET ORAL
Status: DISCONTINUED | OUTPATIENT
Start: 2025-06-09 | End: 2025-06-13 | Stop reason: HOSPADM

## 2025-06-09 RX ORDER — BUMETANIDE 0.25 MG/ML
1 INJECTION, SOLUTION INTRAMUSCULAR; INTRAVENOUS EVERY 12 HOURS
Status: DISCONTINUED | OUTPATIENT
Start: 2025-06-10 | End: 2025-06-13 | Stop reason: HOSPADM

## 2025-06-09 RX ORDER — SODIUM CHLORIDE 0.9 % (FLUSH) 0.9 %
10 SYRINGE (ML) INJECTION AS NEEDED
Status: DISCONTINUED | OUTPATIENT
Start: 2025-06-09 | End: 2025-06-13 | Stop reason: HOSPADM

## 2025-06-09 RX ORDER — PREDNISONE 10 MG/1
10 TABLET ORAL DAILY
Status: DISCONTINUED | OUTPATIENT
Start: 2025-06-09 | End: 2025-06-13 | Stop reason: HOSPADM

## 2025-06-09 RX ORDER — ACETAMINOPHEN 650 MG/1
650 SUPPOSITORY RECTAL EVERY 4 HOURS PRN
Status: DISCONTINUED | OUTPATIENT
Start: 2025-06-09 | End: 2025-06-13 | Stop reason: HOSPADM

## 2025-06-09 RX ORDER — AMOXICILLIN 250 MG
2 CAPSULE ORAL 2 TIMES DAILY PRN
Status: DISCONTINUED | OUTPATIENT
Start: 2025-06-09 | End: 2025-06-13 | Stop reason: HOSPADM

## 2025-06-09 RX ORDER — BUMETANIDE 0.25 MG/ML
2 INJECTION, SOLUTION INTRAMUSCULAR; INTRAVENOUS ONCE
Status: COMPLETED | OUTPATIENT
Start: 2025-06-09 | End: 2025-06-09

## 2025-06-09 RX ORDER — PANTOPRAZOLE SODIUM 40 MG/1
40 TABLET, DELAYED RELEASE ORAL
Status: DISCONTINUED | OUTPATIENT
Start: 2025-06-09 | End: 2025-06-13 | Stop reason: HOSPADM

## 2025-06-09 RX ORDER — BUDESONIDE AND FORMOTEROL FUMARATE DIHYDRATE 160; 4.5 UG/1; UG/1
2 AEROSOL RESPIRATORY (INHALATION)
Status: DISCONTINUED | OUTPATIENT
Start: 2025-06-09 | End: 2025-06-13 | Stop reason: HOSPADM

## 2025-06-09 RX ORDER — POLYETHYLENE GLYCOL 3350 17 G/17G
17 POWDER, FOR SOLUTION ORAL DAILY PRN
Status: DISCONTINUED | OUTPATIENT
Start: 2025-06-09 | End: 2025-06-13 | Stop reason: HOSPADM

## 2025-06-09 RX ORDER — MONTELUKAST SODIUM 10 MG/1
10 TABLET ORAL NIGHTLY
Status: DISCONTINUED | OUTPATIENT
Start: 2025-06-09 | End: 2025-06-13 | Stop reason: HOSPADM

## 2025-06-09 RX ORDER — NITROGLYCERIN 0.4 MG/1
0.4 TABLET SUBLINGUAL
Status: DISCONTINUED | OUTPATIENT
Start: 2025-06-09 | End: 2025-06-09

## 2025-06-09 RX ORDER — NEBIVOLOL 5 MG/1
5 TABLET ORAL
Status: DISCONTINUED | OUTPATIENT
Start: 2025-06-10 | End: 2025-06-13

## 2025-06-09 RX ORDER — NITROGLYCERIN 0.4 MG/1
0.4 TABLET SUBLINGUAL
Status: DISCONTINUED | OUTPATIENT
Start: 2025-06-09 | End: 2025-06-13 | Stop reason: HOSPADM

## 2025-06-09 RX ORDER — FINASTERIDE 5 MG/1
5 TABLET, FILM COATED ORAL DAILY
Status: DISCONTINUED | OUTPATIENT
Start: 2025-06-09 | End: 2025-06-13 | Stop reason: HOSPADM

## 2025-06-09 RX ORDER — SODIUM CHLORIDE 0.9 % (FLUSH) 0.9 %
10 SYRINGE (ML) INJECTION EVERY 12 HOURS SCHEDULED
Status: DISCONTINUED | OUTPATIENT
Start: 2025-06-09 | End: 2025-06-09

## 2025-06-09 RX ORDER — DILTIAZEM HYDROCHLORIDE 180 MG/1
180 CAPSULE, COATED, EXTENDED RELEASE ORAL DAILY
Status: CANCELLED | OUTPATIENT
Start: 2025-06-09

## 2025-06-09 RX ORDER — INSULIN LISPRO 100 [IU]/ML
2-7 INJECTION, SOLUTION INTRAVENOUS; SUBCUTANEOUS
Status: DISCONTINUED | OUTPATIENT
Start: 2025-06-09 | End: 2025-06-13 | Stop reason: HOSPADM

## 2025-06-09 RX ORDER — PREDNISONE 10 MG/1
10 TABLET ORAL DAILY
COMMUNITY

## 2025-06-09 RX ORDER — ACETAMINOPHEN 325 MG/1
650 TABLET ORAL EVERY 4 HOURS PRN
Status: DISCONTINUED | OUTPATIENT
Start: 2025-06-09 | End: 2025-06-13 | Stop reason: HOSPADM

## 2025-06-09 RX ADMIN — BUMETANIDE 2 MG: 0.25 INJECTION INTRAMUSCULAR; INTRAVENOUS at 10:44

## 2025-06-09 RX ADMIN — PANTOPRAZOLE SODIUM 40 MG: 40 TABLET, DELAYED RELEASE ORAL at 21:11

## 2025-06-09 RX ADMIN — SULFUR HEXAFLUORIDE 2 ML: KIT at 16:24

## 2025-06-09 RX ADMIN — ATORVASTATIN CALCIUM 10 MG: 10 TABLET, FILM COATED ORAL at 21:11

## 2025-06-09 RX ADMIN — MONTELUKAST 10 MG: 10 TABLET, FILM COATED ORAL at 21:10

## 2025-06-09 RX ADMIN — PREDNISONE 10 MG: 10 TABLET ORAL at 21:10

## 2025-06-09 RX ADMIN — INSULIN GLARGINE 10 UNITS: 100 INJECTION, SOLUTION SUBCUTANEOUS at 21:10

## 2025-06-09 RX ADMIN — FINASTERIDE 5 MG: 5 TABLET, FILM COATED ORAL at 21:11

## 2025-06-09 RX ADMIN — NITROGLYCERIN 1 INCH: 20 OINTMENT TOPICAL at 10:44

## 2025-06-09 RX ADMIN — BUDESONIDE AND FORMOTEROL FUMARATE DIHYDRATE 2 PUFF: 160; 4.5 AEROSOL RESPIRATORY (INHALATION) at 19:31

## 2025-06-09 RX ADMIN — TAMSULOSIN HYDROCHLORIDE 0.4 MG: 0.4 CAPSULE ORAL at 21:11

## 2025-06-09 RX ADMIN — SODIUM CHLORIDE 2000 MG: 900 INJECTION INTRAVENOUS at 17:50

## 2025-06-09 RX ADMIN — RIVAROXABAN 20 MG: 20 TABLET, FILM COATED ORAL at 21:10

## 2025-06-09 NOTE — CASE MANAGEMENT/SOCIAL WORK
Discharge Planning Assessment  [facility]     Patient Name: Jonatan Mcguire  MRN: [id number]  Today's Date: 6/9/2025    Admit Date: 6/9/2025    Plan: IDP   Discharge Needs Assessment       Row Name 06/09/25 1434       Living Environment    People in Home spouse    Current Living Arrangements home    Primary Care Provided by self       Transition Planning    Transportation Anticipated family or friend will provide       Discharge Needs Assessment    Readmission Within the Last 30 Days unable to assess    Equipment Currently Used at Home cane, straight;walker, rolling;cpap;oxygen;glucometer                   Discharge Plan       Row Name 06/09/25 1435       Plan    Plan IDP    Plan Comments Pt recently discharged from [facility]. Per chart, Pt lives with spouse in [place] Pt’s PCP is Cheli Damico and P has Medicare A and B and [facility] insurance coverage. Pt independent with ADLs @ baseline; Pt has walker, cane, and glucometer. Pt has CPAP and 2L of O2 PRN W/ [facility]. Pt has no HH services. Pt’s preferred pharmacy is [facility] [address] [facility] in [place]. CM will continue to follow.                       Demographic Summary       Row Name 06/09/25 1434       General Information    Arrived From home    Referral Source admission list;emergency department    Reason for Consult discharge planning                   Functional Status       Row Name 06/09/25 1434       Functional Status    Usual Activity Tolerance good    Current Activity Tolerance good       Functional Status, IADL    Medications independent    Meal Preparation independent    Housekeeping independent    Laundry independent    Shopping independent                               JESSENIA Sorto

## 2025-06-09 NOTE — H&P
[facility] Medicine Services  HISTORY AND PHYSICAL    Patient Name: Jonatan Mcguire  : 1950  MRN: [id number]  Primary Care Physician: Cheli Damico MD  Date of admission: 2025      Subjective   Subjective     Chief Complaint:  CHF exacerbation    HPI:  Jonatan Mcguire is a 75 y.o. male with a PMH significant for atrial fibrillation on Xarelto for anticoagulation, COPD, T2DM, HTN, sleep apnea, obesity, chronic venous stasis bilaterally, OA and BPH.  Presented [facility] ED due to worsening shortness of breath, swelling in lower extremities bilaterally and decline in ADLs.  Patient states unable to lay flat, this has been progressively worsened over the past 2 weeks.  Increasing oxygen requirements with exertional dyspnea, wears 2 to 3 L nasal cannula at nighttime.  States he had a big blister on his right leg which burst yesterday with some bleeding which is now subsided.  Endorsing increased weakness and shortness of air on ambulation prompting presentation to the ED.  Denies any chest pain, palpitations, recent ill contacts, abdominal pain, melena, hematochezia or hematemesis.  He also denies any urinary symptoms.    In the ED, hemodynamically stable in fact hypertensive, requiring 3 L nasal cannula oxygen as he desatted to 89% on room air.  Initial troponin 144 with subsequent recheck 142, no significant delta.  proBNP 1415 however patient is obese with a BMI of 33 (this may not accurately represent true value), no significant electrolyte abnormalities with creatinine 0.92.  INR 2.07, hemoglobin 9.5 with MCV 93.8.  UA showing pyuria and bacteriuria with small leukocyte esterase.  CXR showing cardiomegaly with mild pulmonary vascular congestion.  Cardiology team was consulted have recommended admission for echo TTE along with bilateral lower extremity venous duplex, switching Cardizem to Nebivolol, Bumex 1 mg twice daily with possible cardiac PET scan.   Hospitalist team contacted for admission, agreed to admit.      Personal History     Past Medical History:   Diagnosis Date    Afib     COPD (chronic obstructive pulmonary disease)     Diabetes mellitus, type II     Hypertension     Obesity     Sleep apnea     Vitamin D deficiency            Past Surgical History:   Procedure Laterality Date    COLON RESECTION      COLONOSCOPY N/A 4/28/2025    Procedure: COLONOSCOPY;  Surgeon: Beau Mcclain MD;  Location: [facility] ENDOSCOPY;  Service: Gastroenterology;  Laterality: N/A;    NASAL POLYP EXCISION         Family History: family history includes No Known Problems in his father and mother.     Social History:  reports that he has never smoked. He does not have any smokeless tobacco history on file. He reports that he does not drink alcohol and does not use drugs.  Social History     Social History Narrative    Not on file       Medications:  Available home medication information reviewed.  Fluticasone-Salmeterol, Prenatal 27-1, acetaminophen, albuterol sulfate HFA, atorvastatin, dilTIAZem CD, finasteride, glipizide, insulin glargine, levalbuterol, montelukast, pantoprazole, predniSONE, rivaroxaban, spironolactone, tamsulosin, and tiotropium    Allergies   Allergen Reactions    Tetanus Toxoids Unknown - Low Severity       Objective   Objective     Vital Signs:   Temp:  [98.2 °F (36.8 °C)] 98.2 °F (36.8 °C)  Heart Rate:  [72-93] 80  Resp:  [20] 20  BP: (135-166)/(69-89) 152/80  Flow (L/min) (Oxygen Therapy):  [3] 3       Physical Exam   Constitutional: No acute distress, awake, alert  HENT: NCAT, mucous membranes moist  Respiratory: Clear to auscultation bilaterally, respiratory effort normal   Cardiovascular: RRR, no murmurs, rubs, or gallops  Gastrointestinal: Positive bowel sounds, soft, nontender, nondistended  Musculoskeletal: Bilateral lower extremity edema with bullae and venous stasis dermatitis, RLE wound inspected, does not appear to be purulent  Psychiatric:  Appropriate affect, cooperative  Neurologic: Oriented x 3, strength symmetric in all extremities, Cranial Nerves grossly intact to confrontation, speech clear  Skin: No rashes     Result Review:  I have personally reviewed the results from the time of this admission to 6/9/2025 16:23 EDT and agree with these findings:  [x]  Laboratory list / accordion  [x]  Microbiology  [x]  Radiology  [x]  EKG/Telemetry   [x]  Cardiology/Vascular   [x]  Pathology  [x]  Old records  []  Other:    LAB RESULTS:      Lab 06/09/25  1129 06/09/25  0948   WBC  --  7.63   HEMOGLOBIN  --  9.5*   HEMATOCRIT  --  33.5*   PLATELETS  --  218   NEUTROS ABS  --  5.98   IMMATURE GRANS (ABS)  --  0.03   LYMPHS ABS  --  1.04   MONOS ABS  --  0.53   EOS ABS  --  0.03   MCV  --  93.8   CRP 1.06*  --    PROCALCITONIN 0.07  --    LACTATE  --  1.4   PROTIME  --  24.7*   INR  --  2.07*         Lab 06/09/25  1129 06/09/25  0948   SODIUM  --  143   POTASSIUM  --  3.8   CHLORIDE  --  103   CO2  --  29.0   ANION GAP  --  11.0   BUN  --  16.6   CREATININE  --  0.92   EGFR  --  86.2   GLUCOSE  --  87   CALCIUM  --  8.9   HEMOGLOBIN A1C  --  5.30   TSH 1.590  --          Lab 06/09/25  0948   TOTAL PROTEIN 6.3   ALBUMIN 3.5   GLOBULIN 2.8   ALT (SGPT) 20   AST (SGOT) 25   BILIRUBIN 0.5   ALK PHOS 49         Lab 06/09/25  1129 06/09/25  0948   PROBNP  --  1,415.0   HSTROP T 142* 144*         Lab 06/09/25  1129   CHOLESTEROL 97   LDL CHOL 30   HDL CHOL 54   TRIGLYCERIDES 52         Lab 06/09/25  1129   IRON 15*   IRON SATURATION (TSAT) 4*   TIBC 392   TRANSFERRIN 263   FERRITIN 37.30         Lab 06/09/25  1512   FIO2 28   CARBOXYHEMOGLOBIN (VENOUS) 1.6     UA          6/9/2025    10:44   Urinalysis   Squamous Epithelial Cells, UA None Seen    Specific [place], UA 1.013    Ketones, UA Trace    Blood, UA Negative    Leukocytes, UA Small (1+)    Nitrite, UA Negative    RBC, UA 0-2    WBC, UA 21-50    Bacteria, UA 4+        Microbiology Results (last 10 days)       **  No results found for the last 240 hours. **            XR Chest 1 View  Result Date: 6/9/2025  XR CHEST 1 VW Date of Exam: 6/9/2025 9:44 AM EDT Indication: SOA triage protocol Comparison: 10/5/2024 Findings: Heart remains enlarged. Vasculature is cephalized. Patchy multifocal changes in the mid and lower lungs have mostly resolved, appearance of the chest close to the 9/1/2024 baseline exam. Mild left basilar pleural thickening is similar to the 9/1/2024 exam as well. No clearly new pulmonary parenchymal disease is seen.     Impression: Impression: 1. Cardiomegaly and mild pulmonary vascular congestion. 2. No new pulm parenchymal disease is seen. Electronically Signed: Theodore Guthrie MD  6/9/2025 10:05 AM EDT  Workstation ID: [id number]          Assessment & Plan   Assessment & Plan       CHF (congestive heart failure)    Hyperkalemia    COPD (chronic obstructive pulmonary disease)    Afib    Chronic anticoagulation    Diabetes mellitus, type II    Sleep apnea    Hypertension    Acute blood loss anemia    Jonatan Mcguire is a 75 y.o. male with a PMH significant for atrial fibrillation on Xarelto for anticoagulation, COPD, T2DM, HTN, sleep apnea, obesity, chronic venous stasis bilaterally, OA and BPH.  Presented [facility] ED due to worsening shortness of breath, swelling in lower extremities bilaterally and decline in ADLs.    Acute CHF exacerbation  Decline in ADLs  Exertional dyspnea  Lower extremity edema  HTN  Patient presenting with shortness of breath, orthopnea, PND and bendopnea  Increased lower extremity edema with blisters and chronic venous stasis dermatitis  proBNP 1415 on admission however patient's BMI is 33, this may not be accurately represented given patient's obesity  No electrolyte abnormalities, creatinine 0.92.  Electrolyte replete protocol  Cardiology on board, recommending echo TTE with bilateral lower extremity venous duplex, switching diltiazem to Nebivolol 5 mg daily  Administering Bumex  1 mg twice daily  Cardiology considering possible cardiac PET once respiratory status improves likely as outpatient  Echo TTE pending  Checking D-dimer, if elevated will consider getting venous duplex bilaterally  Holding home Aldactone at this time, that is the only blood pressure medication he takes other than Cardizem  Strict I's and O's  Daily weights  Salt restriction  PT/OT    Persistent atrial fibrillation  On chronic anticoagulation  EKG reviewed, showing A-fib with PVCs  [id number] 6 points  On Xarelto 20 mg daily, continue  Was previously on Cardizem, this has now been transitioned to Nebivolol during this hospitalization per cardiology Dr. Chavez    COPD, not in exacerbation  Continue home nebulizers and inhalers  On albuterol HFA, Advair discus, Singulair, prednisone 10 mg daily and Spiriva, continue home medications  If patient more short of breath can consider DuoNebs    Sleep apnea  May bring home CPAP, uses CPAP at nighttime    HLD  Check lipid panel, continue home statin with Lipitor 10 mg    T2DM  Check HbA1c, 5.3 this admission  Takes Lantus 17 units every night as well as glipizide  Initiate SSI and 10 units Lantus daily    Anemia  Hemoglobin 9.5 on admission, MCV 93.8, patient has persistently been anemic  On Xarelto for anticoagulation  Recent colonoscopy 4/28/2025 by Dr. Mcclain showed diverticulosis in sigmoid and descending colon with internal hemorrhoids, suspected diverticular etiology of bleeding    Bilateral venous stasis dermatitis  Bullous dermatitis  Patient had ruptured bullae in RLE, does not appear to be infected at this time, no purulence noted, appears to be healing appropriately  Will hold off on antibiotics at this time however if patient becomes septic, endorses pain or has purulence may need to consider antibiotics with Rocephin and Zyvox (for eagle effect for 3 days)      VTE Prophylaxis:  Pharmacologic VTE prophylaxis orders are signed & held. Mechanical VTE prophylaxis  orders are present.    Total time spent: 80 minutes  Time spent includes time reviewing chart, face-to-face time, counseling patient/family/caregiver, ordering medications/tests/procedures, communicating with other health care professionals, documenting clinical information in the electronic health record, and coordination of care.       CODE STATUS:    Code Status and Medical Interventions: CPR (Attempt to Resuscitate); Full Support   Ordered at: 06/09/25 1433     Code Status (Patient has no pulse and is not breathing):    CPR (Attempt to Resuscitate)     Medical Interventions (Patient has pulse or is breathing):    Full Support     Level Of Support Discussed With:    Patient       Expected Discharge   Expected discharge date/ time has not been documented.     Esther Gutierrez MD  06/09/25

## 2025-06-09 NOTE — ED NOTES
"Jonatan Mcguire    Nursing Report ED to Floor:  Mental status: A&O x 4  Ambulatory status: Up with walker  Oxygen Therapy:  3 LPM NC  Cardiac Rhythm: Afib  Admitted from: ER  Safety Concerns:  High fall risk  Precautions: None  Social Issues: None  ED Room #:  16    ED Nurse Phone Extension - [phone] or may call [phone].      HPI:   Chief Complaint   Patient presents with    Shortness of Breath       Past Medical History:  Past Medical History:   Diagnosis Date    Afib     COPD (chronic obstructive pulmonary disease)     Diabetes mellitus, type II     Hypertension     Obesity     Sleep apnea     Vitamin D deficiency         Past Surgical History:  Past Surgical History:   Procedure Laterality Date    COLON RESECTION      COLONOSCOPY N/A 4/28/2025    Procedure: COLONOSCOPY;  Surgeon: Beau Mcclain MD;  Location: [facility] ENDOSCOPY;  Service: Gastroenterology;  Laterality: N/A;    NASAL POLYP EXCISION          Admitting Doctor:   Esther Gutierrez MD    Consulting Provider(s):  Consults       Date and Time Order Name Status Description    6/9/2025 12:04 PM Inpatient Cardiology Consult Completed              Admitting Diagnosis:   The primary encounter diagnosis was Acute on chronic congestive heart failure, unspecified heart failure type. Diagnoses of Peripheral edema, Stasis dermatitis of both legs, Chronic anemia, Chronic anticoagulation, Chronic atrial fibrillation, and Bacteriuria with pyuria were also pertinent to this visit.    Most Recent Vitals:   Vitals:    06/09/25 1500 06/09/25 1512 06/09/25 1623 06/09/25 1630   BP: 152/80   138/68   Pulse: 92 80  82   Resp:       Temp:       TempSrc:       SpO2: 94% 92%  98%   Weight:   109 kg (240 lb 4.8 oz)    Height:   180.3 cm (70.98\")        Active LDAs/IV Access:   Lines, Drains & Airways       Active LDAs       Name Placement date Placement time Site Days    Peripheral IV 06/09/25 0947 20 G Left Antecubital 06/09/25 0947  Antecubital  less than 1                    Labs " (abnormal labs have a star):   Labs Reviewed   TROPONIN - Abnormal; Notable for the following components:       Result Value    HS Troponin T 144 (*)     All other components within normal limits    Narrative:     High Sensitive Troponin T Reference Range:  <14.0 ng/L- Negative Female for AMI  <22.0 ng/L- Negative Male for AMI  >=14 - Abnormal Female indicating possible myocardial injury.  >=22 - Abnormal Male indicating possible myocardial injury.   Clinicians would have to utilize clinical acumen, EKG, Troponin, and serial changes to determine if it is an Acute Myocardial Infarction or myocardial injury due to an underlying chronic condition.        CBC WITH AUTO DIFFERENTIAL - Abnormal; Notable for the following components:    RBC 3.57 (*)     Hemoglobin 9.5 (*)     Hematocrit 33.5 (*)     MCHC 28.4 (*)     RDW 17.3 (*)     RDW-SD 60.0 (*)     Neutrophil % 78.4 (*)     Lymphocyte % 13.6 (*)     All other components within normal limits   URINALYSIS W/ MICROSCOPIC IF INDICATED (NO CULTURE) - Abnormal; Notable for the following components:    Ketones, UA Trace (*)     Leuk Esterase, UA Small (1+) (*)     All other components within normal limits   HIGH SENSITIVITIY TROPONIN T 1HR - Abnormal; Notable for the following components:    HS Troponin T 142 (*)     All other components within normal limits    Narrative:     High Sensitive Troponin T Reference Range:  <14.0 ng/L- Negative Female for AMI  <22.0 ng/L- Negative Male for AMI  >=14 - Abnormal Female indicating possible myocardial injury.  >=22 - Abnormal Male indicating possible myocardial injury.   Clinicians would have to utilize clinical acumen, EKG, Troponin, and serial changes to determine if it is an Acute Myocardial Infarction or myocardial injury due to an underlying chronic condition.        URINALYSIS, MICROSCOPIC ONLY - Abnormal; Notable for the following components:    WBC, UA 21-50 (*)     Bacteria, UA 4+ (*)     All other components within normal  "limits   C-REACTIVE PROTEIN - Abnormal; Notable for the following components:    C-Reactive Protein 1.06 (*)     All other components within normal limits   PROTIME-INR - Abnormal; Notable for the following components:    Protime 24.7 (*)     INR 2.07 (*)     All other components within normal limits   IRON PROFILE - Abnormal; Notable for the following components:    Iron 15 (*)     Iron Saturation (TSAT) 4 (*)     All other components within normal limits   BLOOD GAS, VENOUS W/CO-OXIMETRY - Abnormal; Notable for the following components:    pH, Venous 7.413 (*)     pCO2, Venous 55.8 (*)     HCO3, Venous 35.6 (*)     Base Excess, Venous 9.5 (*)     Hemoglobin, Blood Gas 10.3 (*)     CO2 Content 37.3 (*)     All other components within normal limits   D-DIMER, QUANTITATIVE - Abnormal; Notable for the following components:    D-Dimer, Quantitative 1.35 (*)     All other components within normal limits    Narrative:     According to the assay 's published package insert, a normal (<0.50 MCGFEU/mL) D-dimer result in conjunction with a non-high clinical probability assessment, excludes deep vein thrombosis (DVT) and pulmonary embolism (PE) with high sensitivity.    D-dimer values increase with age and this can make VTE exclusion of an older population difficult. To address this, the [employer], based on best available evidence and recent guidelines, recommends that clinicians use age-adjusted D-dimer thresholds in patients greater than 50 years of age with: a) a low probability of PE who do not meet all Pulmonary Embolism Rule Out Criteria, or b) in those with intermediate probability of PE.   The formula for an age-adjusted D-dimer cut-off is \"age/100\".  For example, a 60 year old patient would have an age-adjusted cut-off of 0.60 MCGFEU/mL and an 80 year old 0.80 MCGFEU/mL.   BNP (IN-HOUSE) - Normal    Narrative:     This assay is used as an aid in the diagnosis of individuals suspected " "of having heart failure. It can be used as an aid in the diagnosis of acute decompensated heart failure (ADHF) in patients presenting with signs and symptoms of ADHF to the emergency department (ED). In addition, NT-proBNP of <300 pg/mL indicates ADHF is not likely.    Age Range Result Interpretation  NT-proBNP Concentration (pg/mL:      <50             Positive            >450                   Gray                 300-450                    Negative             <300    50-75           Positive            >900                  Gray                300-900                  Negative            <300      >75             Positive            >1800                  Gray                300-1800                  Negative            <300   CK - Normal   LACTIC ACID, PLASMA - Normal   PROCALCITONIN - Normal    Narrative:     As a Marker for Sepsis (Non-Neonates):    1. <0.5 ng/mL represents a low risk of severe sepsis and/or septic shock.  2. >2 ng/mL represents a high risk of severe sepsis and/or septic shock.    As a Marker for Lower Respiratory Tract Infections that require antibiotic therapy:    PCT on Admission    Antibiotic Therapy       6-12 Hrs later    >0.5                Strongly Recommended  >0.25 - <0.5        Recommended   0.1 - 0.25          Discouraged              Remeasure/reassess PCT  <0.1                Strongly Discouraged     Remeasure/reassess PCT    As 28 day mortality risk marker: \"Change in Procalcitonin Result\" (>80% or <=80%) if Day 0 (or Day 1) and Day 4 values are available. Refer to http://www.[employer]-pct-calculator.com    Change in PCT <=80%  A decrease of PCT levels below or equal to 80% defines a positive change in PCT test result representing a higher risk for 28-day all-cause mortality of patients diagnosed with severe sepsis for septic shock.    Change in PCT >80%  A decrease of PCT levels of more than 80% defines a negative change in PCT result representing a lower risk for 28-day " all-cause mortality of patients diagnosed with severe sepsis or septic shock.      TSH RFX ON ABNORMAL TO FREE T4 - Normal   HEMOGLOBIN A1C - Normal    Narrative:     Hemoglobin A1C Ranges:    Increased Risk for Diabetes  5.7% to 6.4%  Diabetes                     >= 6.5%  Diabetic Goal                < 7.0%   FERRITIN - Normal    Narrative:     Results may be falsely decreased if patient taking Biotin.     URINE DRUG SCREEN - Normal    Narrative:     Cutoff For Drugs Screened:    Amphetamines               500 ng/ml  Barbiturates               200 ng/ml  Benzodiazepines            150 ng/ml  Cocaine                    150 ng/ml  Methadone                  200 ng/ml  Opiates                    100 ng/ml  Phencyclidine               25 ng/ml  THC                         50 ng/ml  Methamphetamine            500 ng/ml  Tricyclic Antidepressants  300 ng/ml  Oxycodone                  100 ng/ml  Buprenorphine               10 ng/ml    The normal value for all drugs tested is negative. This report includes unconfirmed screening results, with the cutoff values listed, to be used for medical treatment purposes only.  Unconfirmed results must not be used for non-medical purposes such as employment or legal testing.  Clinical consideration should be applied to any drug of abuse test, particularly when unconfirmed results are used.     FENTANYL, URINE - Normal    Narrative:     Negative Threshold:      Fentanyl 5 ng/mL     The normal value for the drug tested is negative. This report includes final unconfirmed screening results to be used for medical treatment purposes only. Unconfirmed results must not be used for non-medical purposes such as employment or legal testing. Clinical consideration should be applied to any drug of abuse test, particularly when unconfirmed results are used.          URINE CULTURE   RAINBOW DRAW    Narrative:     The following orders were created for panel order [place] Draw.  Procedure                                Abnormality         Status                     ---------                               -----------         ------                     Green Top (Gel)[[id number]]                                  Final result               Lavender Top[[id number]]                                     Final result               Gold Top - SST[[id number]]                                   Final result               Muniz Top[[id number]]                                         Final result               Light Blue Top[[id number]]                                   Final result                 Please view results for these tests on the individual orders.   COMPREHENSIVE METABOLIC PANEL    Narrative:     GFR Categories in Chronic Kidney Disease (CKD)              GFR Category          GFR (mL/min/1.73)    Interpretation  G1                    90 or greater        Normal or high (1)  G2                    60-89                Mild decrease (1)  G3a                   45-59                Mild to moderate decrease  G3b                   30-44                Moderate to severe decrease  G4                    15-29                Severe decrease  G5                    14 or less           Kidney failure    (1)In the absence of evidence of kidney disease, neither GFR category G1 or G2 fulfill the criteria for CKD.    eGFR calculation 2021 CKD-EPI creatinine equation, which does not include race as a factor   BLOOD GAS, VENOUS   LIPID PANEL    Narrative:     Cholesterol Reference Ranges  (U.S. Department of Health and Human Services ATP III Classifications)    Desirable          <200 mg/dL  Borderline High    200-239 mg/dL  High Risk          >240 mg/dL      Triglyceride Reference Ranges  (U.S. Department of Health and Human Services ATP III Classifications)    Normal           <150 mg/dL  Borderline High  150-199 mg/dL  High             200-499 mg/dL  Very High        >500 mg/dL    HDL Reference Ranges  (U.S. Department of Health and  Human Services ATP III Classifications)    Low     <40 mg/dl (major risk factor for CHD)  High    >60 mg/dl ('negative' risk factor for CHD)        LDL Reference Ranges  (U.S. Department of Health and Human Services ATP III Classifications)    Optimal          <100 mg/dL  Near Optimal     100-129 mg/dL  Borderline High  130-159 mg/dL  High             160-189 mg/dL  Very High        >189 mg/dL    LDL is calculated using the NIH LDL-C calculation.     POCT GLUCOSE FINGERSTICK   POCT GLUCOSE FINGERSTICK   POCT GLUCOSE FINGERSTICK   POCT GLUCOSE FINGERSTICK   POCT GLUCOSE FINGERSTICK   CBC AND DIFFERENTIAL    Narrative:     The following orders were created for panel order CBC & Differential.  Procedure                               Abnormality         Status                     ---------                               -----------         ------                     CBC Auto Differential[[id number]]        Abnormal            Final result                 Please view results for these tests on the individual orders.   GREEN TOP   LAVENDER TOP   GOLD TOP - SST   GRAY TOP   LIGHT BLUE TOP       Meds Given in ED:   Medications   ceFAZolin 2000 mg IVPB in 100 mL NS (MBP) (has no administration in time range)   bumetanide (BUMEX) injection 1 mg (has no administration in time range)   nebivolol (BYSTOLIC) tablet 5 mg (has no administration in time range)   sodium chloride 0.9 % flush 10 mL (has no administration in time range)   sodium chloride 0.9 % infusion 40 mL (has no administration in time range)   dextrose (GLUTOSE) oral gel 15 g (has no administration in time range)   dextrose (D50W) (25 g/50 mL) IV injection 25 g (has no administration in time range)   glucagon (GLUCAGEN) injection 1 mg (has no administration in time range)   insulin glargine (LANTUS, SEMGLEE) injection 10 Units (has no administration in time range)   Insulin Lispro (humaLOG) injection 2-7 Units (has no administration in time range)   Potassium  Replacement - Follow Nurse / BPA Driven Protocol (has no administration in time range)   Magnesium Standard Dose Replacement - Follow Nurse / BPA Driven Protocol (has no administration in time range)   Phosphorus Replacement - Follow Nurse / BPA Driven Protocol (has no administration in time range)   Calcium Replacement - Follow Nurse / BPA Driven Protocol (has no administration in time range)   acetaminophen (TYLENOL) tablet 650 mg (has no administration in time range)     Or   acetaminophen (TYLENOL) 160 MG/5ML oral solution 650 mg (has no administration in time range)     Or   acetaminophen (TYLENOL) suppository 650 mg (has no administration in time range)   sennosides-docusate (PERICOLACE) 8.6-50 MG per tablet 2 tablet (has no administration in time range)     And   polyethylene glycol (MIRALAX) packet 17 g (has no administration in time range)     And   bisacodyl (DULCOLAX) EC tablet 5 mg (has no administration in time range)     And   bisacodyl (DULCOLAX) suppository 10 mg (has no administration in time range)   nitroglycerin (NITROSTAT) SL tablet 0.4 mg (has no administration in time range)   nitroglycerin (NITROSTAT) ointment 1 inch (1 inch Topical Given 6/9/25 1044)   bumetanide (BUMEX) injection 2 mg (2 mg Intravenous Given 6/9/25 1044)   Sulfur Hexafluoride Microsph (LUMASON) 60.7-25 MG IV reconstituted suspension reconstituted suspension 2 mL (2 mL Intravenous Given 6/9/25 1624)           Last NIH score:                                                          Dysphagia screening results:        Henry Coma Scale:  No data recorded     CIWA:        Restraint Type:            Isolation Status:  No active isolations

## 2025-06-09 NOTE — CONSULTS
Jonatan Mcguire  [id number]  1950   LOS: 0 days   Patient Care Team:  Cheli Damico MD as PCP - General (Family Medicine)  Provider, No Known as PCP - Family Medicine    ID: 75-year-old  white male from [place], [place], retired from [employer], worked in logistics evaluated in [facility] ED.    Chief Complaint: SOB / BLE SWELLING    Problem List:  Chronic diastolic heart failure/HFpEF:  Echocardiogram 2019: Mild aortic stenosis, no other abnormalities  Echocardiogram March 2023: LVEF 65-70%, no regional wall motion abnormalities, mild concentric LVH, RV enlargement with normal RV systolic function, moderate to severe ALONZO, mild TR, mild aortic stenosis, mild MR, RVSP 52-57 mmHg  Echocardiogram 9/19/2024: LVEF 70-75%, moderate aortic stenosis with mild to moderate TR.  Dilated aortic root 4 cm, mild concentric LVH, ALONZO  CCS 0 angina pectoral/NYHA class III biventricular congestive heart failure, June 2025  Chronic atrial fibrillation  History of failed cardioversions, no prior ablations or antiarrhythmics  CHADsVasc 5, anticoagulated with Xarelto/diltiazem XL, June 2025  Chronic hypertension-probably essential  Dyslipidemia on low-dose atorvastatin, June 2025  Type 2 diabetes mellitus; hemoglobin A1c 6.4% April 2025  Valvular heart disease/moderate aortic stenosis  Aortic root enlargement 4 cm on echocardiogram September 2024  CKD stage III  Moderate pulmonary hypertension  COPD/asthma, symptoms initiated around 17 years old, patient has had remote PFTs (patient says that his lungs are only functioning at 20% capacity) with chronic pulmonary medicine follow-up [facility]-data deficit, June 2025  Moderate obesity: BMI 33.47  NEW, uses CPAP  Vitamin D deficiency  Pancreatic lesions  Incidental cholelithiasis, April 2025  Incidental nephrolithiasis, April 2025  Sigmoid colon diverticulosis  [facility] ED visit September 2024 for shortness of breath, normal proBNP, diagnosed with heart failure,  superimposed PNA  [facility] 5-day hospitalization October 2024 for COVID pneumonia, respiratory failure, hyperkalemia, patient treated with remdesivir, Rocephin, Decadron  [facility] hospitalization April-May 2025 for GIB, patient received 3 PRBCs, CTA imaging was notable for active extravasation into the colon at the junction of the descending colon and sigmoid colon as well as multiple pancreatic cystic lesions. GI followed and performed colonoscopy on 4/28. No active bleeding was seen, diverticuli were present and prior bleeding presumed to be diverticular.  Xarelto restarted 5/1/2025  Chronically elevated troponins  BLE stasis dermatitis with RLE lesion  Abnormal urinalysis/probable urinary tract infection, June 2025  Surgical history:  Colon resection  Nasal polyp excision    Allergies   Allergen Reactions    Tetanus Toxoids Unknown - Low Severity     (Not in a hospital admission)    Scheduled Meds:   Continuous Infusions:   PRN Meds:.  sodium chloride       History of Present Illness:     This is a 75-year-old white male who presents to [facility] ED 6/9/2025 after a 2-week history of worsening shortness of breath and lower extremity edema.  He has a scale at home but does not routinely weigh himself.  Patient chronically in atrial fibrillation and had past unsuccessful ECV's but no prior ablations or antiarrhythmics.  He has history of DHF.  He has never had any stress test, heart catheterizations, CVAs, TIAs, seizures, DVTs, PEs, or rheumatic fever.  Patient has never been a smoker.  He sleeps with CPAP nightly.  He has as needed oxygen to use but mainly only uses this at night.  He has had increased lower extremity edema but no abdominal distention.  He had recent [facility] hospitalization April - May 2025 for GIB and received 3 PRBCs during hospitalization.  He was restarted on Xarelto 5/1/2025.  Patient has gained 11 pounds since his last hospitalization.  On this admission patient's hemoglobin was 9.5, troponin 142, 144, proBNP  "1415.  Patient was given 2 mg IV Bumex and nitroglycerin ointment in the ED.  UOP thus far 700 mL.  Patient was on diltiazem and spironolactone at home.  He is supposed to have an upcoming echocardiogram with [facility] at the end of this month.  He denies any chest pain, palpitations, dizziness, presyncope, or syncope.  He is not overly active at home.  Patient says that he was very sick last fall 2024 when he had COVID pneumonia.  Patient used to see Dr. Terrell but has since been seeing SHALOM Gomez.  Patient says that he has a pulmonologist at [facility] and has had pulmonary function tests in the past and he believes that his lungs are only functioning at 20% capacity.  He was never exposed to any fumes/toxins in the past.  It is unknown why he has developed COPD.  He started having problems with asthma at 17 years old.    Cardiac risk factors: advanced age (older than 55 for men, 65 for women), diabetes mellitus, dyslipidemia, hypertension, male gender, obesity (BMI >= 30 kg/m2), and sedentary lifestyle.    Social History     Socioeconomic History    Marital status:    Tobacco Use    Smoking status: Never   Vaping Use    Vaping status: Never Used   Substance and Sexual Activity    Alcohol use: Never    Drug use: Never     Family History   Problem Relation Age of Onset    No Known Problems Mother     No Known Problems Father        Review of Systems  10 point review of systems was completed, positives outlined in the HPI, and otherwise all other systems are negative.      Objective:       Physical Exam  /74   Pulse 75   Temp 98.2 °F (36.8 °C) (Oral)   Resp 20   Ht 180.3 cm (71\")   Wt 109 kg (240 lb)   SpO2 97%   BMI 33.47 kg/m²       06/09/25  0950   Weight: 109 kg (240 lb)     Body mass index is 33.47 kg/m².    Intake/Output Summary (Last 24 hours) at 6/9/2025 1228  Last data filed at 6/9/2025 1132  Gross per 24 hour   Intake --   Output 700 ml   Net -700 ml       General " Appearance:  Alert, cooperative, no distress, appears stated age, obesity   Head:  Normocephalic, without obvious abnormality, atraumatic   Neck: Supple, symmetrical, trachea midline, no adenopathy, thyroid: not enlarged, symmetric, no tenderness/mass/nodules, no carotid bruit or JVD   Lungs:   Decreased breath sounds to auscultation bilaterally, respirations unlabored, 2 L O2 NC   Heart:  Irregular rate and rhythm, S1, S2 normal, grade 2/6 murmur, no rub or gallop   Abdomen:   Soft, nontender, no masses, no organomegaly, bowel sounds audible x4   Extremities: 3+edema, normal range of motion   Pulses: 1+ and symmetric   Skin: Stasis dermatitis BLE, RLE lesion   Neurologic: Normal; no focal changes noted and gait not tested     Cardiographics:    EKG 06/09/2025:    Atrial fibrillation with premature ventricular or aberrantly conducted  complexes  Low voltage QRS  Right bundle branch block  Septal infarct (cited on or before 05-Oct-2024)  T wave abnormality, consider inferolateral ischemia  Abnormal ECG  When compared with ECG of 09-Jun-2025 09:43,  No significant change was found    Imaging:    Chest x-ray 06/09/2025:    1. Cardiomegaly and mild pulmonary vascular congestion.   2. No new pulm parenchymal disease is seen.    Lab Review   Results from last 7 days   Lab Units 06/09/25  0948   SODIUM mmol/L 143   POTASSIUM mmol/L 3.8   CHLORIDE mmol/L 103   CO2 mmol/L 29.0   BUN mg/dL 16.6   CREATININE mg/dL 0.92   GLUCOSE mg/dL 87   CALCIUM mg/dL 8.9     Results from last 7 days   Lab Units 06/09/25  0948   WBC 10*3/mm3 7.63   HEMOGLOBIN g/dL 9.5*   HEMATOCRIT % 33.5*   PLATELETS 10*3/mm3 218     Results from last 7 days   Lab Units 06/09/25  1129 06/09/25  0948   HSTROP T ng/L 142* 144*     proBNP 1415  URINALYSIS: Trace ketones with 1+ leukocytes and 21-50 WBCs/hpf with 4+ bacteriuria  NO ABGs / VIRAL RESPIRATORY PANEL PCR / D-DIMER / MAGNESIUM / THYROID PANEL / CRP / LACTATE / IRON STUDIES / PROCALCITONIN /  ESR  ALBUMIN: 3.5  LFTs: WNL  NO DRIPS.     Assessment:     Patient with shortness of breath which is likely multifactorial with diastolic heart failure, COPD, anemia, atrial fibrillation, and moderate aortic stenosis.  He has severe bilateral lower extremity swelling probably secondary to combined venous insufficiency with probable component of chronic right lower extremity cellulitis and right heart failure.  No recent fever, chills, purulent sputum production, hemoptysis, or pleurisy and therefore doubt acute exacerbation of unknown chronic lung disease.  Uncertain whether he has had prior high resolution CT scan and I am perplexed by his reported pulmonary dysfunction.  Doubt acute coronary artery syndrome but cannot exclude progressive senile fibrocalcific aortic stenosis.  His current cardiology assessment is limited by records at [facility] not currently available for review.  In view of his multiple comorbidities would request assistance for admission and care with the [facility] hospitalist service.     Plan:   Echocardiogram +/- BLE lower extremity venous duplex study  Would switch diltiazem to Nebivolol 5 mg daily  Recommend  IV Bumex 1 mg twice daily   Consideration of cardiac PET once respiratory status improves/as outpatient  Advised patient to weigh himself daily after discharge and will need closer outpatient monitoring with his [facility] cardiologist  Hospitalist to admit for further treatment for COPD, stasis dermatitis/RLE lesion/anemia  BMP, Mag in morning;; consider iron studies and D-dimer  We will follow with you during current hospitalization.    Discussed with patient and Dr. Lobo.    Electronically signed by SHALOM Castano, 06/09/25, 1:15 PM EDT.     I have seen and examined the patient, case was discussed with the physician extender, reviewed the above note, necessary changes were made and I agree with the final note.     Prasanth Chavez MD [facility]

## 2025-06-09 NOTE — ED PROVIDER NOTES
Subjective   History of Present Illness  75-year-old male comes in with a 2-week history of worsening shortness of breath and leg swelling.  Patient has a history of congestive heart failure atrial fibrillation and COPD.  Patient has a inhaler at home that has helped his shortness of breath as well as his nightly oxygen but states his shortness of breath continues to get worse without use of these.  On review of patient's recent hospital visit he has gained 11 pounds.    History provided by:  Patient and EMS personnel      Review of Systems    Past Medical History:   Diagnosis Date    Afib     COPD (chronic obstructive pulmonary disease)     Diabetes mellitus, type II     Hypertension     Obesity     Sleep apnea     Vitamin D deficiency        Allergies   Allergen Reactions    Tetanus Toxoids Unknown - Low Severity       Past Surgical History:   Procedure Laterality Date    COLON RESECTION      COLONOSCOPY N/A 4/28/2025    Procedure: COLONOSCOPY;  Surgeon: Beau Mcclain MD;  Location: [facility] ENDOSCOPY;  Service: Gastroenterology;  Laterality: N/A;    NASAL POLYP EXCISION         Family History   Problem Relation Age of Onset    No Known Problems Mother     No Known Problems Father        Social History     Socioeconomic History    Marital status:    Tobacco Use    Smoking status: Never   Vaping Use    Vaping status: Never Used   Substance and Sexual Activity    Alcohol use: Never    Drug use: Never           Objective   Physical Exam  Vitals and nursing note reviewed.   Constitutional:       Appearance: He is obese. He is ill-appearing. He is not toxic-appearing.   Cardiovascular:      Rate and Rhythm: Normal rate and regular rhythm.      Pulses: Normal pulses.      Heart sounds: Normal heart sounds.   Pulmonary:      Effort: No respiratory distress.      Breath sounds: No stridor.      Comments: Mild dyspnea without exertion  Abdominal:      General: There is no distension.   Musculoskeletal:          General: Swelling and tenderness present. No deformity.      Right lower leg: Edema present.      Left lower leg: Edema present.      Comments: Stasis dermatitis on bilateral lower extremities with a stasis ulcer on the anterior aspect of the right shin   Neurological:      Mental Status: He is alert and oriented to person, place, and time.   Psychiatric:         Mood and Affect: Mood normal.         Behavior: Behavior normal.         Procedures           ED Course  ED Course as of 06/09/25 1904   Mon Jun 09, 2025   1002 Chart review of historical data does demonstrate that the patient is 10 to 11 pounds heavier than her weight 6 weeks ago.  She has been this current weight in the past, but significant change from most recent value. [RS]   1011 Hemoglobin(!): 9.5  Overall stable chronic anemia compared to prior values.  It is slightly improved compared to most recent. [RS]   1011 SpO2(!): 89 %  Hypoxemia noted on room air.  Patient placed on 2 L via nasal cannula [RS]   1016 Personally viewed the patient's most recent records.  The patient was admitted from April 26 until May 2 of this year secondary to acute on chronic anemia associated with GI bleeding exacerbated by chronic anticoagulation.  Patient initially admitted to the ICU.  See that documentation for details.  Also noted that the patient follows up with Shabana Willett with the [facility] cardiology.  Patient was last seen there in March of this year. [RS]   1053 HS Troponin T(!!): 144  Elevated troponin noted.  However, the patient does have chronically elevated troponin though this is slightly higher than prior. [RS]   1202 XR Chest 1 View  Personally viewed the single view of the chest.  On my interpretation there is no focal lobar infiltrate visualized [RS]   1407 Patient evaluated in the emergency department by cardiology.  They recommend admission to the hospitalist service secondary to the patient's other comorbidities.  I think this is  reasonable at this point.  Hospitalist messaged for admission [RS]      ED Course User Index  [RS] Jm Lobo MD                                                       Medical Decision Making  Problems Addressed:  Acute on chronic congestive heart failure, unspecified heart failure type: complicated acute illness or injury  Bacteriuria with pyuria: complicated acute illness or injury  Chronic anemia: complicated acute illness or injury  Chronic anticoagulation: complicated acute illness or injury  Chronic atrial fibrillation: complicated acute illness or injury  Peripheral edema: complicated acute illness or injury  Stasis dermatitis of both legs: complicated acute illness or injury    Amount and/or Complexity of Data Reviewed  Independent Historian: EMS  Labs: ordered. Decision-making details documented in ED Course.  Radiology: ordered. Decision-making details documented in ED Course.  ECG/medicine tests: ordered.  Discussion of management or test interpretation with external provider(s): Cardiology and Hospitalist    Risk  Prescription drug management.  Decision regarding hospitalization.        Final diagnoses:   Acute on chronic congestive heart failure, unspecified heart failure type   Peripheral edema   Stasis dermatitis of both legs   Chronic anemia   Chronic anticoagulation   Chronic atrial fibrillation   Bacteriuria with pyuria       ED Disposition  ED Disposition       ED Disposition   Decision to Admit    Condition   --    Comment   Level of Care: Telemetry [5]   Diagnosis: CHF (congestive heart failure) [878441]   Admitting Physician: ARTEMIO RUEDA [[id number]]   Certification: I Certify That Inpatient Hospital Services Are Medically Necessary For Greater Than 2 Midnights                 No follow-up provider specified.       Medication List      No changes were made to your prescriptions during this visit.            Jm Lobo MD  06/09/25 [id number]

## 2025-06-09 NOTE — Clinical Note
Level of Care: Telemetry [5]   Diagnosis: CHF (congestive heart failure) [976254]   Admitting Physician: ARTEMIO RUEDA [[id number]]

## 2025-06-10 ENCOUNTER — APPOINTMENT (OUTPATIENT)
Dept: CARDIOLOGY | Facility: HOSPITAL | Age: 75
DRG: 291 | End: 2025-06-10
Payer: MEDICARE

## 2025-06-10 LAB
ANION GAP SERPL CALCULATED.3IONS-SCNC: 8 MMOL/L (ref 5–15)
BH CV LOWER VASCULAR LEFT COMMON FEMORAL AUGMENT: NORMAL
BH CV LOWER VASCULAR LEFT COMMON FEMORAL COMPRESS: NORMAL
BH CV LOWER VASCULAR LEFT COMMON FEMORAL PHASIC: NORMAL
BH CV LOWER VASCULAR LEFT COMMON FEMORAL SPONT: NORMAL
BH CV LOWER VASCULAR LEFT DISTAL FEMORAL COMPRESS: NORMAL
BH CV LOWER VASCULAR LEFT GASTRONEMIUS COMPRESS: NORMAL
BH CV LOWER VASCULAR LEFT GREATER SAPH AK COMPRESS: NORMAL
BH CV LOWER VASCULAR LEFT GREATER SAPH BK COMPRESS: NORMAL
BH CV LOWER VASCULAR LEFT LESSER SAPH COMPRESS: NORMAL
BH CV LOWER VASCULAR LEFT MID FEMORAL AUGMENT: NORMAL
BH CV LOWER VASCULAR LEFT MID FEMORAL COMPRESS: NORMAL
BH CV LOWER VASCULAR LEFT MID FEMORAL PHASIC: NORMAL
BH CV LOWER VASCULAR LEFT MID FEMORAL SPONT: NORMAL
BH CV LOWER VASCULAR LEFT PERONEAL COMPRESS: NORMAL
BH CV LOWER VASCULAR LEFT POPLITEAL AUGMENT: NORMAL
BH CV LOWER VASCULAR LEFT POPLITEAL COMPRESS: NORMAL
BH CV LOWER VASCULAR LEFT POPLITEAL PHASIC: NORMAL
BH CV LOWER VASCULAR LEFT POPLITEAL SPONT: NORMAL
BH CV LOWER VASCULAR LEFT POSTERIOR TIBIAL COMPRESS: NORMAL
BH CV LOWER VASCULAR LEFT PROFUNDA FEMORAL COMPRESS: NORMAL
BH CV LOWER VASCULAR LEFT PROXIMAL FEMORAL COMPRESS: NORMAL
BH CV LOWER VASCULAR LEFT SAPHENOFEMORAL JUNCTION COMPRESS: NORMAL
BH CV LOWER VASCULAR RIGHT COMMON FEMORAL AUGMENT: NORMAL
BH CV LOWER VASCULAR RIGHT COMMON FEMORAL COMPRESS: NORMAL
BH CV LOWER VASCULAR RIGHT COMMON FEMORAL PHASIC: NORMAL
BH CV LOWER VASCULAR RIGHT COMMON FEMORAL SPONT: NORMAL
BH CV LOWER VASCULAR RIGHT DISTAL FEMORAL COMPRESS: NORMAL
BH CV LOWER VASCULAR RIGHT GASTRONEMIUS COMPRESS: NORMAL
BH CV LOWER VASCULAR RIGHT GREATER SAPH AK COMPRESS: NORMAL
BH CV LOWER VASCULAR RIGHT GREATER SAPH BK COMPRESS: NORMAL
BH CV LOWER VASCULAR RIGHT LESSER SAPH COMPRESS: NORMAL
BH CV LOWER VASCULAR RIGHT MID FEMORAL AUGMENT: NORMAL
BH CV LOWER VASCULAR RIGHT MID FEMORAL COMPRESS: NORMAL
BH CV LOWER VASCULAR RIGHT MID FEMORAL PHASIC: NORMAL
BH CV LOWER VASCULAR RIGHT MID FEMORAL SPONT: NORMAL
BH CV LOWER VASCULAR RIGHT POPLITEAL AUGMENT: NORMAL
BH CV LOWER VASCULAR RIGHT POPLITEAL COMPRESS: NORMAL
BH CV LOWER VASCULAR RIGHT POPLITEAL PHASIC: NORMAL
BH CV LOWER VASCULAR RIGHT POPLITEAL SPONT: NORMAL
BH CV LOWER VASCULAR RIGHT POSTERIOR TIBIAL COMPRESS: NORMAL
BH CV LOWER VASCULAR RIGHT PROFUNDA FEMORAL COMPRESS: NORMAL
BH CV LOWER VASCULAR RIGHT PROXIMAL FEMORAL COMPRESS: NORMAL
BH CV LOWER VASCULAR RIGHT SAPHENOFEMORAL JUNCTION AUGMENT: NORMAL
BH CV LOWER VASCULAR RIGHT SAPHENOFEMORAL JUNCTION PHASIC: NORMAL
BH CV LOWER VASCULAR RIGHT SAPHENOFEMORAL JUNCTION SPONT: NORMAL
BUN SERPL-MCNC: 14.8 MG/DL (ref 8–23)
BUN/CREAT SERPL: 17 (ref 7–25)
CALCIUM SPEC-SCNC: 8.4 MG/DL (ref 8.6–10.5)
CHLORIDE SERPL-SCNC: 101 MMOL/L (ref 98–107)
CO2 SERPL-SCNC: 34 MMOL/L (ref 22–29)
CREAT SERPL-MCNC: 0.87 MG/DL (ref 0.76–1.27)
DEPRECATED RDW RBC AUTO: 58.3 FL (ref 37–54)
EGFRCR SERPLBLD CKD-EPI 2021: 89.4 ML/MIN/1.73
ERYTHROCYTE [DISTWIDTH] IN BLOOD BY AUTOMATED COUNT: 17.2 % (ref 12.3–15.4)
GLUCOSE BLDC GLUCOMTR-MCNC: 136 MG/DL (ref 70–130)
GLUCOSE BLDC GLUCOMTR-MCNC: 153 MG/DL (ref 70–130)
GLUCOSE BLDC GLUCOMTR-MCNC: 70 MG/DL (ref 70–130)
GLUCOSE BLDC GLUCOMTR-MCNC: 94 MG/DL (ref 70–130)
GLUCOSE SERPL-MCNC: 61 MG/DL (ref 65–99)
HCT VFR BLD AUTO: 29.9 % (ref 37.5–51)
HGB BLD-MCNC: 8.5 G/DL (ref 13–17.7)
MAGNESIUM SERPL-MCNC: 1.3 MG/DL (ref 1.6–2.4)
MCH RBC QN AUTO: 26.4 PG (ref 26.6–33)
MCHC RBC AUTO-ENTMCNC: 28.4 G/DL (ref 31.5–35.7)
MCV RBC AUTO: 92.9 FL (ref 79–97)
PLATELET # BLD AUTO: 207 10*3/MM3 (ref 140–450)
PMV BLD AUTO: 9.4 FL (ref 6–12)
POTASSIUM SERPL-SCNC: 3.8 MMOL/L (ref 3.5–5.2)
POTASSIUM SERPL-SCNC: 4.4 MMOL/L (ref 3.5–5.2)
RBC # BLD AUTO: 3.22 10*6/MM3 (ref 4.14–5.8)
SODIUM SERPL-SCNC: 143 MMOL/L (ref 136–145)
WBC NRBC COR # BLD AUTO: 5.38 10*3/MM3 (ref 3.4–10.8)

## 2025-06-10 PROCEDURE — 97165 OT EVAL LOW COMPLEX 30 MIN: CPT

## 2025-06-10 PROCEDURE — 85027 COMPLETE CBC AUTOMATED: CPT

## 2025-06-10 PROCEDURE — 97162 PT EVAL MOD COMPLEX 30 MIN: CPT

## 2025-06-10 PROCEDURE — 25010000002 MAGNESIUM SULFATE 2 GM/50ML SOLUTION: Performed by: INTERNAL MEDICINE

## 2025-06-10 PROCEDURE — 94799 UNLISTED PULMONARY SVC/PX: CPT

## 2025-06-10 PROCEDURE — 63710000001 INSULIN LISPRO (HUMAN) PER 5 UNITS

## 2025-06-10 PROCEDURE — 94664 DEMO&/EVAL PT USE INHALER: CPT

## 2025-06-10 PROCEDURE — 94761 N-INVAS EAR/PLS OXIMETRY MLT: CPT

## 2025-06-10 PROCEDURE — 63710000001 REVEFENACIN 175 MCG/3ML SOLUTION

## 2025-06-10 PROCEDURE — 63710000001 PREDNISONE PER 5 MG

## 2025-06-10 PROCEDURE — 84132 ASSAY OF SERUM POTASSIUM: CPT | Performed by: INTERNAL MEDICINE

## 2025-06-10 PROCEDURE — 63710000001 INSULIN GLARGINE PER 5 UNITS

## 2025-06-10 PROCEDURE — 83735 ASSAY OF MAGNESIUM: CPT

## 2025-06-10 PROCEDURE — 82948 REAGENT STRIP/BLOOD GLUCOSE: CPT

## 2025-06-10 PROCEDURE — 99232 SBSQ HOSP IP/OBS MODERATE 35: CPT | Performed by: INTERNAL MEDICINE

## 2025-06-10 PROCEDURE — 25010000002 BUMETANIDE PER 0.5 MG: Performed by: NURSE PRACTITIONER

## 2025-06-10 PROCEDURE — 93970 EXTREMITY STUDY: CPT | Performed by: INTERNAL MEDICINE

## 2025-06-10 PROCEDURE — 80048 BASIC METABOLIC PNL TOTAL CA: CPT

## 2025-06-10 PROCEDURE — 25010000002 CEFAZOLIN PER 500 MG: Performed by: INTERNAL MEDICINE

## 2025-06-10 PROCEDURE — 93970 EXTREMITY STUDY: CPT

## 2025-06-10 RX ORDER — MAGNESIUM SULFATE HEPTAHYDRATE 40 MG/ML
2 INJECTION, SOLUTION INTRAVENOUS
Status: COMPLETED | OUTPATIENT
Start: 2025-06-10 | End: 2025-06-10

## 2025-06-10 RX ORDER — POTASSIUM CHLORIDE 1500 MG/1
20 TABLET, EXTENDED RELEASE ORAL ONCE
Status: COMPLETED | OUTPATIENT
Start: 2025-06-10 | End: 2025-06-10

## 2025-06-10 RX ADMIN — EMPAGLIFLOZIN 10 MG: 10 TABLET, FILM COATED ORAL at 12:04

## 2025-06-10 RX ADMIN — BUDESONIDE AND FORMOTEROL FUMARATE DIHYDRATE 2 PUFF: 160; 4.5 AEROSOL RESPIRATORY (INHALATION) at 09:07

## 2025-06-10 RX ADMIN — BUMETANIDE 1 MG: 0.25 INJECTION INTRAMUSCULAR; INTRAVENOUS at 08:06

## 2025-06-10 RX ADMIN — TAMSULOSIN HYDROCHLORIDE 0.4 MG: 0.4 CAPSULE ORAL at 08:07

## 2025-06-10 RX ADMIN — NEBIVOLOL 5 MG: 5 TABLET ORAL at 08:06

## 2025-06-10 RX ADMIN — REVEFENACIN 175 MCG: 175 SOLUTION RESPIRATORY (INHALATION) at 09:07

## 2025-06-10 RX ADMIN — PREDNISONE 10 MG: 10 TABLET ORAL at 08:07

## 2025-06-10 RX ADMIN — ALBUTEROL SULFATE 2.5 MG: 2.5 SOLUTION RESPIRATORY (INHALATION) at 03:32

## 2025-06-10 RX ADMIN — MAGNESIUM SULFATE HEPTAHYDRATE 2 G: 40 INJECTION, SOLUTION INTRAVENOUS at 07:57

## 2025-06-10 RX ADMIN — BUDESONIDE AND FORMOTEROL FUMARATE DIHYDRATE 2 PUFF: 160; 4.5 AEROSOL RESPIRATORY (INHALATION) at 19:31

## 2025-06-10 RX ADMIN — CEFAZOLIN 1000 MG: 1 INJECTION, POWDER, FOR SOLUTION INTRAMUSCULAR; INTRAVENOUS at 13:38

## 2025-06-10 RX ADMIN — PANTOPRAZOLE SODIUM 40 MG: 40 TABLET, DELAYED RELEASE ORAL at 17:21

## 2025-06-10 RX ADMIN — PANTOPRAZOLE SODIUM 40 MG: 40 TABLET, DELAYED RELEASE ORAL at 07:58

## 2025-06-10 RX ADMIN — INSULIN LISPRO 2 UNITS: 100 INJECTION, SOLUTION INTRAVENOUS; SUBCUTANEOUS at 17:21

## 2025-06-10 RX ADMIN — RIVAROXABAN 20 MG: 20 TABLET, FILM COATED ORAL at 17:21

## 2025-06-10 RX ADMIN — CEFAZOLIN 1000 MG: 1 INJECTION, POWDER, FOR SOLUTION INTRAMUSCULAR; INTRAVENOUS at 22:09

## 2025-06-10 RX ADMIN — BUMETANIDE 1 MG: 0.25 INJECTION INTRAMUSCULAR; INTRAVENOUS at 22:08

## 2025-06-10 RX ADMIN — ACETAMINOPHEN 650 MG: 325 TABLET ORAL at 01:05

## 2025-06-10 RX ADMIN — POTASSIUM CHLORIDE 20 MEQ: 1500 TABLET, EXTENDED RELEASE ORAL at 07:58

## 2025-06-10 RX ADMIN — MONTELUKAST 10 MG: 10 TABLET, FILM COATED ORAL at 22:07

## 2025-06-10 RX ADMIN — FINASTERIDE 5 MG: 5 TABLET, FILM COATED ORAL at 08:06

## 2025-06-10 RX ADMIN — MAGNESIUM SULFATE HEPTAHYDRATE 2 G: 40 INJECTION, SOLUTION INTRAVENOUS at 10:05

## 2025-06-10 RX ADMIN — MAGNESIUM SULFATE HEPTAHYDRATE 2 G: 40 INJECTION, SOLUTION INTRAVENOUS at 12:04

## 2025-06-10 RX ADMIN — ATORVASTATIN CALCIUM 10 MG: 10 TABLET, FILM COATED ORAL at 08:07

## 2025-06-10 RX ADMIN — INSULIN GLARGINE 10 UNITS: 100 INJECTION, SOLUTION SUBCUTANEOUS at 22:08

## 2025-06-10 NOTE — PLAN OF CARE
Goal Outcome Evaluation:  Plan of Care Reviewed With: patient           Outcome Evaluation: OT eval completed. Pt presents with generalized weakness, SOA, and BLE edema impacting ADL performance. Pt ModA for LBD, HAILE for toileting tasks, HAILE for grooming while seated. IP OT services warranted to support return to PLOF. Recommend home with assist at discharge.    Anticipated Discharge Disposition (OT): home with assist

## 2025-06-10 NOTE — PROGRESS NOTES
Enter Query Response Below      Query Response: Heart failure due to hypertension and valvular heart disease             If applicable, please update the problem list.

## 2025-06-10 NOTE — NURSING NOTE
WOC consulted for compression wraps, will place PT Wound Care orders.   Please re-consult WOC if needed.

## 2025-06-10 NOTE — THERAPY EVALUATION
Patient Name: Jonatan Mcguire  : 1950    MRN: [id number]                              Today's Date: 6/10/2025       Admit Date: 2025    Visit Dx:     ICD-10-CM ICD-9-CM   1. Acute on chronic congestive heart failure, unspecified heart failure type  I50.9 428.0   2. Peripheral edema  R60.0 782.3   3. Stasis dermatitis of both legs  I87.2 454.1   4. Chronic anemia  D64.9 285.9   5. Chronic anticoagulation  Z79.01 V58.61   6. Chronic atrial fibrillation  I48.20 427.31   7. Bacteriuria with pyuria  R82.71 791.9    R82.81      Patient Active Problem List   Diagnosis    Hyperkalemia    COPD (chronic obstructive pulmonary disease)    Afib    Chronic anticoagulation    Diabetes mellitus, type II    Sleep apnea    Hypertension    Acute blood loss anemia    CHF (congestive heart failure)     Past Medical History:   Diagnosis Date    Afib     COPD (chronic obstructive pulmonary disease)     Diabetes mellitus, type II     Hypertension     Obesity     Sleep apnea     Vitamin D deficiency      Past Surgical History:   Procedure Laterality Date    COLON RESECTION      COLONOSCOPY N/A 2025    Procedure: COLONOSCOPY;  Surgeon: Beau Mcclain MD;  Location: [facility] ENDOSCOPY;  Service: Gastroenterology;  Laterality: N/A;    NASAL POLYP EXCISION        General Information       Row Name 06/10/25 1141          Physical Therapy Time and Intention    Document Type evaluation  -MB     Mode of Treatment physical therapy  -MB       Row Name 06/10/25 1141          General Information    Patient Profile Reviewed yes  -MB     Prior Level of Function independent:;all household mobility;gait;transfer;bed mobility;ADL's;driving;using stairs  patient reports utilizing his RW at baseline; no recent falls; patient reports he has a CPAP and wears 2L of O2 at home as needed  -MB     Existing Precautions/Restrictions fall  -MB     Barriers to Rehab medically complex;previous functional deficit;physical barrier  -MB       Row Name 06/10/25  1141          Living Environment    Current Living Arrangements home  -MB     People in Home spouse  -MB       Row Name 06/10/25 1141          Home Main Entrance    Number of Stairs, Main Entrance five  -MB     Stair Railings, Main Entrance railings on both sides of stairs  -MB       Row Name 06/10/25 1141          Stairs Within Home, Primary    Stairs, Within Home, Primary Patient has first floor living access. Has basement, but doesn't utilize.  -MB       Row Name 06/10/25 1141          Cognition    Orientation Status (Cognition) oriented x 4  -MB       Row Name 06/10/25 1141          Safety Issues/Impairments Affecting Functional Mobility    Safety Issues Affecting Function (Mobility) impulsivity;safety precaution awareness  -MB     Impairments Affecting Function (Mobility) balance;endurance/activity tolerance;postural/trunk control;shortness of breath;strength  -MB               User Key  (r) = Recorded By, (t) = Taken By, (c) = Cosigned By      Initials Name Provider Type    Fanny Franco, PT Physical Therapist                   Mobility       Row Name 06/10/25 1145          Sit-Stand Transfer    Sit-Stand [place] (Transfers) contact guard  -MB     Assistive Device (Sit-Stand Transfers) walker, front-wheeled  -MB       Row Name 06/10/25 1145          Gait/Stairs (Locomotion)    [place] Level (Gait) contact guard  -MB     Assistive Device (Gait) walker, front-wheeled  -MB     Patient was able to Ambulate yes  -MB     Distance in Feet (Gait) 75  -MB     Deviations/Abnormal Patterns (Gait) ha decreased;gait speed decreased  -MB     Bilateral Gait Deviations forward flexed posture  -MB               User Key  (r) = Recorded By, (t) = Taken By, (c) = Cosigned By      Initials Name Provider Type    Fanny Franco, PT Physical Therapist                   Obj/Interventions       Row Name 06/10/25 1146          Range of Motion Comprehensive    General Range of Motion bilateral lower extremity ROM WFL   -MB       Row Name 06/10/25 1146          Strength Comprehensive (MMT)    Comment, General Manual Muscle Testing (MMT) Assessment Patient's hips 5/5, knees and ankles at least 3/5, however, not formally assessed secondary to wound sites.  -MB       Row Name 06/10/25 1146          Balance    Balance Assessment sitting static balance;sitting dynamic balance;standing static balance;standing dynamic balance  -MB     Static Sitting Balance standby assist  -MB     Dynamic Sitting Balance standby assist  -MB     Position, Sitting Balance unsupported;sitting in chair  -MB     Static Standing Balance contact guard  -MB     Dynamic Standing Balance contact guard  -MB     Position/Device Used, Standing Balance supported;walker, front-wheeled  -MB       Row Name 06/10/25 1146          Sensory Assessment (Somatosensory)    Sensory Assessment (Somatosensory) sensation intact  per patient report  -MB               User Key  (r) = Recorded By, (t) = Taken By, (c) = Cosigned By      Initials Name Provider Type    Fanny Franco, PT Physical Therapist                   Goals/Plan       [facility] Name 06/10/25 1154          Bed Mobility Goal 1 (PT)    Activity/Assistive Device (Bed Mobility Goal 1, PT) bed mobility activities, all  -MB     [place] Level/Cues Needed (Bed Mobility Goal 1, PT) modified independence  -MB     Time Frame (Bed Mobility Goal 1, PT) short term goal (STG);1 week  -[facility] Name 06/10/25 1151          Transfer Goal 1 (PT)    Activity/Assistive Device (Transfer Goal 1, PT) sit-to-stand/stand-to-sit;walker, rolling  -MB     [place] Level/Cues Needed (Transfer Goal 1, PT) modified independence  -MB     Time Frame (Transfer Goal 1, PT) long term goal (LTG);2 weeks  -[facility] Name 06/10/25 1155          Gait Training Goal 1 (PT)    Activity/Assistive Device (Gait Training Goal 1, PT) gait (walking locomotion);walker, rolling  -MB     Distance (Gait Training Goal 1, PT) 300  -MB     Time Frame (Gait  Training Goal 1, PT) long term goal (LTG);2 weeks  -MB       Row Name 06/10/25 1154          Stairs Goal 1 (PT)    Activity/Assistive Device (Stairs Goal 1, PT) ascending stairs;descending stairs;using handrail, left;using handrail, right  -MB     [place] Level/Cues Needed (Stairs Goal 1, PT) standby assist  -MB     Number of Stairs (Stairs Goal 1, PT) 5  -MB     Time Frame (Stairs Goal 1, PT) long term goal (LTG);2 weeks  -MB               User Key  (r) = Recorded By, (t) = Taken By, (c) = Cosigned By      Initials Name Provider Type    Fanny Franco, PT Physical Therapist                   Clinical Impression       Row Name 06/10/25 1148          Pain    Pretreatment Pain Rating 0/10 - no pain  -MB     Posttreatment Pain Rating 0/10 - no pain  -MB       Row Name 06/10/25 1148          Plan of Care Review    Plan of Care Reviewed With patient  -MB     Outcome Evaluation Patient participated in PT initial evaluation this date. Patient presents with impaired strength, balance, and functional endurance that impacts mobility and independence. Patient would benefit from skilled PT services at this time with PT to continue to see while patient is inpatient. Recommend patient discharge home with assist and home health PT/OT when medically appropriate for discharge. Patient already has necessary DME available for use at home.  -MB       Row Name 06/10/25 1148          Therapy Assessment/Plan (PT)    Patient/Family Therapy Goals Statement (PT) To return home.  -MB     Rehab Potential (PT) good  -MB     Criteria for Skilled Interventions Met (PT) yes;skilled treatment is necessary  -MB     Therapy Frequency (PT) daily  -MB     Predicted Duration of Therapy Intervention (PT) 2 weeks  -MB       Row Name 06/10/25 1148          Vital Signs    Post Systolic BP Rehab 132  -MB     Post Treatment Diastolic BP 72  -MB     Pretreatment Heart Rate (beats/min) 71  -MB     Posttreatment Heart Rate (beats/min) 65  -MB     Pre SpO2  (%) 94  -MB     O2 Delivery Pre Treatment nasal cannula  2L  -MB     Post SpO2 (%) 98  -MB     O2 Delivery Post Treatment nasal cannula  2L  -MB     Pre Patient Position Sitting  -MB     Post Patient Position Sitting  -MB       Row Name 06/10/25 1148          Positioning and Restraints    Pre-Treatment Position sitting in chair/recliner  -MB     Post Treatment Position chair  -MB     In Chair sitting;call light within reach;encouraged to call for assist  Patient deferred having legs elevated  -MB               User Key  (r) = Recorded By, (t) = Taken By, (c) = Cosigned By      Initials Name Provider Type    Fanny Franco, PT Physical Therapist                   Outcome Measures       Row Name 06/10/25 1155 06/10/25 0800       How much help from another person do you currently need...    Turning from your back to your side while in flat bed without using bedrails? 3  -MB 3  -VS    Moving from lying on back to sitting on the side of a flat bed without bedrails? 3  -MB 3  -VS    Moving to and from a bed to a chair (including a wheelchair)? 3  -MB 2  -VS    Standing up from a chair using your arms (e.g., wheelchair, bedside chair)? 3  -MB 2  -VS    Climbing 3-5 steps with a railing? 3  anticipated  -MB 2  -VS    To walk in hospital room? 3  -MB 2  -VS    AM-PAC 6 Clicks Score (PT) 18  -MB 14  -VS    Highest Level of Mobility Goal Walk 10 Steps or More-6  -MB Move to Chair/Commode-4  -VS      Row Name 06/10/25 1202 06/10/25 1155       Functional Assessment    Outcome Measure Options AM-PAC 6 Clicks Daily Activity (OT)  -SWATI AM-PAC 6 Clicks Basic Mobility (PT)  -MB              User Key  (r) = Recorded By, (t) = Taken By, (c) = Cosigned By      Initials Name Provider Type    Liya Nuñez OT Occupational Therapist    VS Janelle Duenas RN Registered Nurse    Fanny Franco, PT Physical Therapist                                 Physical Therapy Education       Title: PT OT SLP Therapies (In Progress)       Topic:  Physical Therapy (In Progress)       Point: Mobility training (Done)       Learning Progress Summary            Patient Acceptance, E, VU by MB at 6/10/2025 1156                      Point: Home exercise program (Not Started)       Learner Progress:  Not documented in this visit.              Point: Body mechanics (Done)       Learning Progress Summary            Patient Acceptance, E, VU by MB at 6/10/2025 1156                      Point: Precautions (Done)       Learning Progress Summary            Patient Acceptance, E, VU by MB at 6/10/2025 1156                                      User Key       Initials Effective Dates Name Provider Type Discipline    MB 05/30/25 -  Fanny Bae, PT Physical Therapist PT                  PT Recommendation and Plan     Outcome Evaluation: Patient participated in PT initial evaluation this date. Patient presents with impaired strength, balance, and functional endurance that impacts mobility and independence. Patient would benefit from skilled PT services at this time with PT to continue to see while patient is inpatient. Recommend patient discharge home with assist and home health PT/OT when medically appropriate for discharge. Patient already has necessary DME available for use at home.     Time Calculation:   PT Evaluation Complexity  History, PT Evaluation Complexity: 1-2 personal factors and/or comorbidities  Examination of Body Systems (PT Eval Complexity): total of 3 or more elements  Clinical Presentation (PT Evaluation Complexity): evolving  Clinical Decision Making (PT Evaluation Complexity): moderate complexity  Overall Complexity (PT Evaluation Complexity): moderate complexity     PT Charges       Row Name 06/10/25 1312             Time Calculation    Start Time 0937  -MB      PT Received On 06/10/25  -MB      PT Goal Re-Cert Due Date 06/20/25  -MB         Untimed Charges    PT Eval/Re-eval Minutes 46  -MB         Total Minutes    Untimed Charges Total Minutes 46   -MB       Total Minutes 46  -MB                User Key  (r) = Recorded By, (t) = Taken By, (c) = Cosigned By      Initials Name Provider Type    Fanny Franco, PT Physical Therapist                  Therapy Charges for Today       Code Description Service Date Service Provider Modifiers Qty    06308856307 HC PT EVAL MOD COMPLEXITY 4 6/10/2025 Fanny Bae, PT GP 1            PT G-Codes  Outcome Measure Options: AM-PAC 6 Clicks Daily Activity (OT)  AM-PAC 6 Clicks Score (PT): 18  AM-PAC 6 Clicks Score (OT): 18  PT Discharge Summary  Anticipated Discharge Disposition (PT): home with assist, home with home health    Fanny Bae, JOE  6/10/2025

## 2025-06-10 NOTE — PROGRESS NOTES
"Jonatan Mcguire  [id number]  1950   LOS: 1 day   Patient Care Team:  Cheli Damico MD as PCP - General (Family Medicine)  Provider, No Known as PCP - Family Medicine    Chief Complaint:  SEVERE ANEMIA / COPD & CHRONIC O2 & MILD-MODERATE PH / AS / HFpEF / CHRONIC AFIB / SEVERE BLE VENOUS INSUFFICIENCY / HTN / UTI / T2 DM    Subjective     Up in chair and states he is \"not a whole lot better.\"  He still notes tachypnea and dyspnea as well as lower extremity swelling and drainage particularly when he stands and ambulates to the bathroom.  He does not like the hospital food.  Denies anginal type chest discomfort, sputum reduction, pleurisy, or hemoptysis.  Oximetry 98% on 2 L/min nasal cannula; he states that his nebulized treatments significantly improve his breathing.  No current GI/ difficulty.    Objective     Vital Sign Min/Max for last 24 hours  Temp  Min: 97.7 °F (36.5 °C)  Max: 98.3 °F (36.8 °C)   BP  Min: 125/73  Max: 166/71   Pulse  Min: 60  Max: 93   Resp  Min: 18  Max: 20   SpO2  Min: 89 %  Max: 100 %      Weight  Min: 109 kg (240 lb)  Max: 109 kg (240 lb 9.6 oz)         06/09/25  1623 06/10/25  0500   Weight: 109 kg (240 lb 4.8 oz) 109 kg (240 lb 9.6 oz)         Intake/Output Summary (Last 24 hours) at 6/10/2025 0743  Last data filed at 6/10/2025 0300  Gross per 24 hour   Intake --   Output 6000 ml   Net -6000 ml       Physical Exam:     General Appearance:    Alert, cooperative, in no acute distress   Lungs:   Diffuse diminished breath sounds with bibasilar crackles/dullness more notable on the left,respirations regular, even and unlabored    Heart:    Irregular and normal rate, variable S1 and S2, grade 3/6 systolic murmur, no gallop, no rub, no click   Abdomen:  Extremities:   Soft, nontender, bowel sounds audible x4  3+ BLE edema, normal range of motion   Pulses:   Pulses palpable and equal bilaterally      Results Review:   Results from last 7 days   Lab Units 06/10/25  0545 " 06/09/25  0948   SODIUM mmol/L 143 143   POTASSIUM mmol/L 3.8 3.8   CHLORIDE mmol/L 101 103   CO2 mmol/L 34.0* 29.0   BUN mg/dL 14.8 16.6   CREATININE mg/dL 0.87 0.92   GLUCOSE mg/dL 61* 87   CALCIUM mg/dL 8.4* 8.9     Results from last 7 days   Lab Units 06/10/25  0545 06/09/25  0948   WBC 10*3/mm3 5.38 7.63   HEMOGLOBIN g/dL 8.5* 9.5*   HEMATOCRIT % 29.9* 33.5*   PLATELETS 10*3/mm3 207 218     Results from last 7 days   Lab Units 06/09/25  1129   CHOLESTEROL mg/dL 97   TRIGLYCERIDES mg/dL 52   HDL CHOL mg/dL 54   LDL CHOL mg/dL 30     Results from last 7 days   Lab Units 06/09/25  0948   HEMOGLOBIN A1C % 5.30     Results from last 7 days   Lab Units 06/09/25  1129 06/09/25  0948   CK TOTAL U/L 157  --    HSTROP T ng/L 142* 144*     MAGNESIUM: 1.3    NO NEW CXR / EKG.    ECHO:      Left ventricular systolic function is normal. Calculated left ventricular EF = 67.2% Normal left ventricular cavity size noted. Left ventricular wall thickness is consistent with mild concentric hypertrophy. Elevated left atrial pressure.    The right ventricular cavity is mild to moderately dilated. Normal right ventricular systolic function noted.    Left atrial volume is severely increased.    There is moderate calcification of the aortic valve. No aortic valve regurgitation is present. Mild aortic valve stenosis is present. Aortic valve area is 1.49 cm2. Peak velocity of the flow distal to the aortic valve is 259.2 cm/s. Aortic valve mean pressure gradient is 14.3 mmHg.    Mitral annular calcification is present. Mild mitral valve regurgitation is present. No significant mitral valve stenosis is present.    Mild to moderate tricuspid valve regurgitation is present. Estimated right ventricular systolic pressure from tricuspid regurgitation is moderately elevated (45-55 mmHg).    Medication Review: REVIEWED; NO DRIPS.    Assessment & Plan     Improving clinical course following excellent diuresis and would restart selective aldosterone  receptor antagonist therapy with spironolactone 25 mg daily and add empagliflozin 10 mg daily if all concur.  Urine culture and sensitivity needs follow-up and potential treatment.  Would continue current cardiac medications. Severe reduction in serum magnesium level currently being actively corrected.  Would continue daily assessments of GFR/magnesium levels.  Would defer MPS/LHC at this time.  Will defer further evaluation of pulmonary status to his pulmonary medicine provider at [facility].    Discussed with patient and RN.      CHF (congestive heart failure)    Hyperkalemia    COPD (chronic obstructive pulmonary disease)    Afib    Chronic anticoagulation    Diabetes mellitus, type II    Sleep apnea    Hypertension    Acute blood loss anemia    06/10/25  07:43 EDT

## 2025-06-10 NOTE — THERAPY EVALUATION
Patient Name: Jonatan Mcguire  : 1950    MRN: [id number]                              Today's Date: 6/10/2025       Admit Date: 2025    Visit Dx:     ICD-10-CM ICD-9-CM   1. Acute on chronic congestive heart failure, unspecified heart failure type  I50.9 428.0   2. Peripheral edema  R60.0 782.3   3. Stasis dermatitis of both legs  I87.2 454.1   4. Chronic anemia  D64.9 285.9   5. Chronic anticoagulation  Z79.01 V58.61   6. Chronic atrial fibrillation  I48.20 427.31   7. Bacteriuria with pyuria  R82.71 791.9    R82.81      Patient Active Problem List   Diagnosis    Hyperkalemia    COPD (chronic obstructive pulmonary disease)    Afib    Chronic anticoagulation    Diabetes mellitus, type II    Sleep apnea    Hypertension    Acute blood loss anemia    CHF (congestive heart failure)     Past Medical History:   Diagnosis Date    Afib     COPD (chronic obstructive pulmonary disease)     Diabetes mellitus, type II     Hypertension     Obesity     Sleep apnea     Vitamin D deficiency      Past Surgical History:   Procedure Laterality Date    COLON RESECTION      COLONOSCOPY N/A 2025    Procedure: COLONOSCOPY;  Surgeon: Beau Mcclain MD;  Location: [facility] ENDOSCOPY;  Service: Gastroenterology;  Laterality: N/A;    NASAL POLYP EXCISION        General Information       Row Name 06/10/25 1148          OT Time and Intention    Document Type evaluation  -SWATI     Mode of Treatment occupational therapy  -SWATI       Row Name 06/10/25 1148          General Information    Patient Profile Reviewed yes  -SWATI     Prior Level of Function independent:;ADL's;bed mobility;transfer;all household mobility;community mobility;driving;shopping;using stairs  Ambulates using FWW; typically sponge bathes at baseline; sleeps with CPAP; 2-3L supplemental O2 PRN at home  -SWATI     Existing Precautions/Restrictions fall;oxygen therapy device and L/min;other (see comments)  BLE edema/wounds  -SWATI     Barriers to Rehab medically complex;previous  functional deficit  -SWATI       Row Name 06/10/25 1148          Occupational Profile    Environmental Supports and Barriers (Occupational Profile) Lives with spouse; has FWW, TTB in tub shower, CPAP; attends [employer] when able  -SWATI       Row Name 06/10/25 1148          Living Environment    Current Living Arrangements home  -SWATI     People in Home spouse  -SWATI       Row Name 06/10/25 1148          Home Main Entrance    Number of Stairs, Main Entrance five  -SWATI     Stair Railings, Main Entrance railings on both sides of stairs  -SWATI       Row Name 06/10/25 1148          Stairs Within Home, Primary    Stairs, Within Home, Primary All needs met on first floor; no need to access basement  -SWATI     Number of Stairs, Within Home, Primary twelve  -SWATI       Row Name 06/10/25 1148          Cognition    Orientation Status (Cognition) oriented x 4  -SWATI       Row Name 06/10/25 1148          Safety Issues/Impairments Affecting Functional Mobility    Safety Issues Affecting Function (Mobility) awareness of need for assistance;insight into deficits/self-awareness;safety precaution awareness  -SWATI     Impairments Affecting Function (Mobility) balance;endurance/activity tolerance;postural/trunk control;shortness of breath;strength  -SWATI     Comment, Safety Issues/Impairments (Mobility) education provided regarding elevating BLE's  -SWATI               User Key  (r) = Recorded By, (t) = Taken By, (c) = Cosigned By      Initials Name Provider Type    Liya Nuñez OT Occupational Therapist                     Mobility/ADL's       Row Name 06/10/25 1153          Bed Mobility    Comment, (Bed Mobility) Received [facility] and returned to chair  -SWATI       Row Name 06/10/25 1153          Transfers    Transfers sit-stand transfer  -SWATI       Row Name 06/10/25 1153          Sit-Stand Transfer    Sit-Stand Kevin (Transfers) contact guard  -     Assistive Device (Sit-Stand Transfers) walker, front-wheeled  -SWATI       Row Name 06/10/25 1153           Functional Mobility    Functional Mobility- Comment Defer to PT for details  -       Row Name 06/10/25 1153          Activities of Daily Living    BADL Assessment/Intervention lower body dressing;grooming;toileting  -[facility] Name 06/10/25 1153          Hygiene Care    Oral Care patient refused intervention  -SWATI       Row Name 06/10/25 1153          Lower Body Dressing Assessment/Training    [place] Level (Lower Body Dressing) doff;don;socks;moderate assist (50% patient effort)  -SWATI     Position (Lower Body Dressing) unsupported sitting  -[facility] Name 06/10/25 1153          Grooming Assessment/Training    [place] Level (Grooming) wash face, hands;set up  -SWATI     Position (Grooming) supported sitting  -[facility] Name 06/10/25 1153          Toileting Assessment/Training    [place] Level (Toileting) perform perineal hygiene;set up  -SWATI     Assistive Devices (Toileting) urinal  -SWATI     Position (Toileting) supported sitting  -SWATI     Comment, (Toileting) Pt utilized urinal while seated  -               User Key  (r) = Recorded By, (t) = Taken By, (c) = Cosigned By      Initials Name Provider Type    Liya Nuñez OT Occupational Therapist                   Obj/Interventions       [facility] Name 06/10/25 1155          Sensory Assessment (Somatosensory)    Sensory Assessment (Somatosensory) UE sensation intact  -SWATI       Row Name 06/10/25 1155          Vision Assessment/Intervention    Visual Impairment/Limitations WFL;corrective lenses full-time  -SWATI       Row Name 06/10/25 1155          Range of Motion Comprehensive    General Range of Motion bilateral upper extremity ROM WFL  -SWATI       Row Name 06/10/25 1155          Strength Comprehensive (MMT)    Comment, General Manual Muscle Testing (MMT) Assessment BUE strength grossly 4/5  -SWATI       Row Name 06/10/25 1155          Balance    Balance Assessment sitting static balance;sitting dynamic balance;standing static balance;standing dynamic  balance  -SWATI     Static Sitting Balance standby assist  -SWATI     Dynamic Sitting Balance standby assist  -SWATI     Position, Sitting Balance unsupported;sitting in chair  -SWATI     Static Standing Balance supervision  -SWATI     Dynamic Standing Balance contact guard  -SWATI     Position/Device Used, Standing Balance supported;walker, front-wheeled  -SWATI               User Key  (r) = Recorded By, (t) = Taken By, (c) = Cosigned By      Initials Name Provider Type    Liya Nuñez OT Occupational Therapist                   Goals/Plan       Row Name 06/10/25 1200          Transfer Goal 1 (OT)    Activity/Assistive Device (Transfer Goal 1, OT) sit-to-stand/stand-to-sit;bed-to-chair/chair-to-bed;toilet;walker, rolling  -SWATI     [place] Level/Cues Needed (Transfer Goal 1, OT) modified independence  -SWATI     Time Frame (Transfer Goal 1, OT) long term goal (LTG);10 days  -SWATI     Progress/Outcome (Transfer Goal 1, OT) new goal  -SWATI       Row Name 06/10/25 1200          Dressing Goal 1 (OT)    Activity/Device (Dressing Goal 1, OT) lower body dressing  -SWATI     [place]/Cues Needed (Dressing Goal 1, OT) supervision required  -SWATI     Time Frame (Dressing Goal 1, OT) short term goal (STG);1 week  -SWATI     Progress/Outcome (Dressing Goal 1, OT) new goal  -SWATI       Row Name 06/10/25 1200          Toileting Goal 1 (OT)    Activity/Device (Toileting Goal 1, OT) adjust/manage clothing;perform perineal hygiene;commode;grab bar/safety frame  -SWATI     [place] Level/Cues Needed (Toileting Goal 1, OT) modified independence  -SWATI     Time Frame (Toileting Goal 1, OT) short term goal (STG);1 week  -SWATI     Progress/Outcome (Toileting Goal 1, OT) new goal  -SWATI       Row Name 06/10/25 1200          Therapy Assessment/Plan (OT)    Planned Therapy Interventions (OT) activity tolerance training;BADL retraining;edema control/reduction;functional balance retraining;occupation/activity based interventions;patient/caregiver  education/training;ROM/therapeutic exercise;strengthening exercise;transfer/mobility retraining  -SWATI               User Key  (r) = Recorded By, (t) = Taken By, (c) = Cosigned By      Initials Name Provider Type    Liya Nuñez, CONI Occupational Therapist                   Clinical Impression       Row Name 06/10/25 1156          Pain Assessment    Pretreatment Pain Rating 0/10 - no pain  -SWATI     Posttreatment Pain Rating 0/10 - no pain  -SWATI       Row Name 06/10/25 1156          Plan of Care Review    Plan of Care Reviewed With patient  -SWATI     Outcome Evaluation OT eval completed. Pt presents with generalized weakness, SOA, and BLE edema impacting ADL performance. Pt ModA for LBD, HAILE for toileting tasks, HAILE for grooming while seated. IP OT services warranted to support return to PLOF. Recommend home with assist at discharge.  -SWATI       Row Name 06/10/25 1159          Therapy Assessment/Plan (OT)    Patient/Family Therapy Goal Statement (OT) To return to PLOF  -SWATI     Rehab Potential (OT) good  -SWATI     Criteria for Skilled Therapeutic Interventions Met (OT) yes;meets criteria;skilled treatment is necessary  -SWATI     Therapy Frequency (OT) daily  -SWATI     Predicted Duration of Therapy Intervention (OT) 10 days  -SWATI       Row Name 06/10/25 1156          Therapy Plan Review/Discharge Plan (OT)    Anticipated Discharge Disposition (OT) home with assist  -SWATI       Row Name 06/10/25 1155          Vital Signs    Pre Systolic BP Rehab 137  -SWATI     Pre Treatment Diastolic BP 79  -SWATI     Post Systolic BP Rehab 132  -SWATI     Post Treatment Diastolic BP 72  -SWATI     Pretreatment Heart Rate (beats/min) 71  -SWATI     Posttreatment Heart Rate (beats/min) 71  -SWATI     Pre SpO2 (%) 98  -SWATI     O2 Delivery Pre Treatment nasal cannula  -SWATI     O2 Delivery Intra Treatment nasal cannula  -SWATI     Post SpO2 (%) 96  -SWATI     O2 Delivery Post Treatment nasal cannula  -SWATI     Pre Patient Position Sitting  -SWATI     Intra Patient Position Standing   -SWATI     Post Patient Position Sitting  -SWATI       Row Name 06/10/25 1156          Positioning and Restraints    Pre-Treatment Position sitting in chair/recliner  -SWATI     Post Treatment Position chair  -SWATI     In Chair notified nsg;sitting;call light within reach;encouraged to call for assist;waffle cushion  RN deferred chair alarm; pt declined having legs elevated  -SWATI               User Key  (r) = Recorded By, (t) = Taken By, (c) = Cosigned By      Initials Name Provider Type    Liya Nuñez OT Occupational Therapist                   Outcome Measures       Row Name 06/10/25 1202          How much help from another is currently needed...    Putting on and taking off regular lower body clothing? 2  -SWATI     Bathing (including washing, rinsing, and drying) 3  -SWATI     Toileting (which includes using toilet bed pan or urinal) 3  -SWATI     Putting on and taking off regular upper body clothing 3  -SWATI     Taking care of personal grooming (such as brushing teeth) 3  -SWATI     Eating meals 4  -SWATI     AM-PAC 6 Clicks Score (OT) 18  -SWATI       Row Name 06/10/25 1155 06/10/25 0800       How much help from another person do you currently need...    Turning from your back to your side while in flat bed without using bedrails? 3  -MB 3  -VS    Moving from lying on back to sitting on the side of a flat bed without bedrails? 3  -MB 3  -VS    Moving to and from a bed to a chair (including a wheelchair)? 3  -MB 2  -VS    Standing up from a chair using your arms (e.g., wheelchair, bedside chair)? 3  -MB 2  -VS    Climbing 3-5 steps with a railing? 3  anticipated  -MB 2  -VS    To walk in hospital room? 3  -MB 2  -VS    AM-PAC 6 Clicks Score (PT) 18  -MB 14  -VS    Highest Level of Mobility Goal Walk 10 Steps or More-6  -MB Move to Chair/Commode-4  -VS      Row Name 06/10/25 1202 06/10/25 1155       Functional Assessment    Outcome Measure Options AM-PAC 6 Clicks Daily Activity (OT)  -SWATI AM-PAC 6 Clicks Basic Mobility (PT)  -MB               User Key  (r) = Recorded By, (t) = Taken By, (c) = Cosigned By      Initials Name Provider Type    Lyia Nuñez OT Occupational Therapist    Janelle Oneil, RN Registered Nurse    Fanny Franco, PT Physical Therapist                    Occupational Therapy Education       Title: PT OT SLP Therapies (In Progress)       Topic: Occupational Therapy (In Progress)       Point: ADL training (Done)       Learning Progress Summary            Patient Acceptance, E, VU,NR by  at 6/10/2025 1202    Comment: OT POC; importance of elevating BLE's for edema control; incentive spirometer use/goal setting                      Point: Home exercise program (Done)       Learning Progress Summary            Patient Acceptance, E, VU,NR by  at 6/10/2025 1202    Comment: OT POC; importance of elevating BLE's for edema control; incentive spirometer use/goal setting                                      User Key       Initials Effective Dates Name Provider Type Discipline     06/16/21 -  Liya Hillman OT Occupational Therapist OT                  OT Recommendation and Plan  Planned Therapy Interventions (OT): activity tolerance training, BADL retraining, edema control/reduction, functional balance retraining, occupation/activity based interventions, patient/caregiver education/training, ROM/therapeutic exercise, strengthening exercise, transfer/mobility retraining  Therapy Frequency (OT): daily  Plan of Care Review  Plan of Care Reviewed With: patient  Outcome Evaluation: OT eval completed. Pt presents with generalized weakness, SOA, and BLE edema impacting ADL performance. Pt ModA for LBD, HAILE for toileting tasks, HAILE for grooming while seated. IP OT services warranted to support return to PLOF. Recommend home with assist at discharge.     Time Calculation:   Evaluation Complexity (OT)  Review Occupational Profile/Medical/Therapy History Complexity: expanded/moderate complexity  Assessment, Occupational  Performance/Identification of Deficit Complexity: 1-3 performance deficits  Clinical Decision Making Complexity (OT): problem focused assessment/low complexity  Overall Complexity of Evaluation (OT): low complexity     Time Calculation- OT       Row Name 06/10/25 0925             Time Calculation- OT    OT Start Time 0925  -SWATI      OT Received On 06/10/25  -SWATI      OT Goal Re-Cert Due Date 06/20/25  -SWATI         Untimed Charges    OT Eval/Re-eval Minutes 53  -SWATI         Total Minutes    Untimed Charges Total Minutes 53  -SWATI       Total Minutes 53  -SWATI                User Key  (r) = Recorded By, (t) = Taken By, (c) = Cosigned By      Initials Name Provider Type    Liya Nuñez OT Occupational Therapist                  Therapy Charges for Today       Code Description Service Date Service Provider Modifiers Qty    [id number] HC OT EVAL LOW COMPLEXITY 4 6/10/2025 Liya Hillman OT GO 1                 Liya Hillman OT  6/10/2025

## 2025-06-10 NOTE — PLAN OF CARE
Goal Outcome Evaluation:  Plan of Care Reviewed With: patient           Outcome Evaluation: Patient participated in PT initial evaluation this date. Patient presents with impaired strength, balance, and functional endurance that impacts mobility and independence. Patient would benefit from skilled PT services at this time with PT to continue to see while patient is inpatient. Recommend patient discharge home with assist and home health PT/OT when medically appropriate for discharge. Patient already has necessary DME available for use at home.    Anticipated Discharge Disposition (PT): home with assist, home with home health

## 2025-06-10 NOTE — PROGRESS NOTES
"    [facility] Medicine Services  PROGRESS NOTE    Patient Name: Jonatan Mcguire  : 1950  MRN: [id number]    Date of Admission: 2025  Primary Care Physician: Cheli Damico MD    Subjective   Subjective     CC:  Chf     HPI:  Patient is doing ok, sitting up in chair and reports he feels much better today. No family present. Feels like he is \"peeing every 5 min\" . Has diuresed over 6L since admission    ROS  As above      Objective   Objective     Vital Signs:   Temp:  [97.7 °F (36.5 °C)-98.3 °F (36.8 °C)] 98 °F (36.7 °C)  Heart Rate:  [58-92] 58  Resp:  [18-22] 20  BP: (125-152)/(68-89) 128/73  Flow (L/min) (Oxygen Therapy):  [3] 3     Physical Exam:  GEN: NAD, resting in bed, awake  HEENT: on 3L, atraumatic, normocephalic  NECK: supple, no masses  RESP: on 3L, normal effort  CV: on tele, sinus rhythm  PSYCH: normal affect, appropriate  NEURO: awake, alert, no focal deficits noted  MSK: edema noted and bullous dermatitis noted on b/l LE,patient with open wounds present on RLE   SKIN: no rashes noted       Results Reviewed:  LAB RESULTS:      Lab 06/10/25  0545 25  1129 25  0948   WBC 5.38  --  7.63   HEMOGLOBIN 8.5*  --  9.5*   HEMATOCRIT 29.9*  --  33.5*   PLATELETS 207  --  218   NEUTROS ABS  --   --  5.98   IMMATURE GRANS (ABS)  --   --  0.03   LYMPHS ABS  --   --  1.04   MONOS ABS  --   --  0.53   EOS ABS  --   --  0.03   MCV 92.9  --  93.8   CRP  --  1.06*  --    PROCALCITONIN  --  0.07  --    LACTATE  --   --  1.4   PROTIME  --   --  24.7*   D DIMER QUANT  --   --  1.35*   HSTROP T  --  142* 144*         Lab 06/10/25  0545 25  1129 25  0948   SODIUM 143  --  143   POTASSIUM 3.8  --  3.8   CHLORIDE 101  --  103   CO2 34.0*  --  29.0   ANION GAP 8.0  --  11.0   BUN 14.8  --  16.6   CREATININE 0.87  --  0.92   EGFR 89.4  --  86.2   GLUCOSE 61*  --  87   CALCIUM 8.4*  --  8.9   MAGNESIUM 1.3*  --   --    HEMOGLOBIN A1C  --   --  5.30   TSH  --  " 1.590  --          Lab 06/09/25  0948   TOTAL PROTEIN 6.3   ALBUMIN 3.5   GLOBULIN 2.8   ALT (SGPT) 20   AST (SGOT) 25   BILIRUBIN 0.5   ALK PHOS 49         Lab 06/09/25  1129 06/09/25  0948   PROBNP  --  1,415.0   HSTROP T 142* 144*   PROTIME  --  24.7*   INR  --  2.07*         Lab 06/09/25  1129   CHOLESTEROL 97   LDL CHOL 30   HDL CHOL 54   TRIGLYCERIDES 52         Lab 06/09/25  1129   IRON 15*   IRON SATURATION (TSAT) 4*   TIBC 392   TRANSFERRIN 263   FERRITIN 37.30         Lab 06/09/25  1512   FIO2 28   CARBOXYHEMOGLOBIN (VENOUS) 1.6     Brief Urine Lab Results  (Last result in the past 365 days)        Color   Clarity   Blood   Leuk Est   Nitrite   Protein   CREAT   Urine HCG        06/09/25 1044 Yellow   Clear   Negative   Small (1+)   Negative   Negative                   Microbiology Results Abnormal       Procedure Component Value - Date/Time    Urine Culture - Urine, Urine, Clean Catch [[id number]]  (Abnormal) Collected: 06/09/25 1533    Lab Status: Preliminary result Specimen: Urine, Clean Catch Updated: 06/10/25 0907     Urine Culture 25,000 CFU/mL Gram Negative Bacilli    Narrative:      Colonization of the urinary tract without infection is common. Treatment is discouraged unless the patient is symptomatic, pregnant, or undergoing an invasive urologic procedure.            XR Chest 1 View  Result Date: 6/9/2025  XR CHEST 1 VW Date of Exam: 6/9/2025 9:44 AM EDT Indication: SOA triage protocol Comparison: 10/5/2024 Findings: Heart remains enlarged. Vasculature is cephalized. Patchy multifocal changes in the mid and lower lungs have mostly resolved, appearance of the chest close to the 9/1/2024 baseline exam. Mild left basilar pleural thickening is similar to the 9/1/2024 exam as well. No clearly new pulmonary parenchymal disease is seen.     Impression: Impression: 1. Cardiomegaly and mild pulmonary vascular congestion. 2. No new pulm parenchymal disease is seen. Electronically Signed: Theodore Guthrie MD   6/9/2025 10:05 AM EDT  Workstation ID: [id number]      Results for orders placed during the hospital encounter of 06/09/25    Adult Transthoracic Echo Complete W/ Cont if Necessary Per Protocol    Interpretation Summary    Left ventricular systolic function is normal. Calculated left ventricular EF = 67.2% Normal left ventricular cavity size noted. Left ventricular wall thickness is consistent with mild concentric hypertrophy. Elevated left atrial pressure.    The right ventricular cavity is mild to moderately dilated. Normal right ventricular systolic function noted.    Left atrial volume is severely increased.    There is moderate calcification of the aortic valve. No aortic valve regurgitation is present. Mild aortic valve stenosis is present. Aortic valve area is 1.49 cm2. Peak velocity of the flow distal to the aortic valve is 259.2 cm/s. Aortic valve mean pressure gradient is 14.3 mmHg.    Mitral annular calcification is present. Mild mitral valve regurgitation is present. No significant mitral valve stenosis is present.    Mild to moderate tricuspid valve regurgitation is present. Estimated right ventricular systolic pressure from tricuspid regurgitation is moderately elevated (45-55 mmHg).      Current medications:  Scheduled Meds:atorvastatin, 10 mg, Oral, Daily  budesonide-formoterol, 2 puff, Inhalation, BID - RT  bumetanide, 1 mg, Intravenous, Q12H  finasteride, 5 mg, Oral, Daily  insulin glargine, 10 Units, Subcutaneous, Nightly  insulin lispro, 2-7 Units, Subcutaneous, 4x Daily AC & at Bedtime  magnesium sulfate, 2 g, Intravenous, Q2H  montelukast, 10 mg, Oral, Nightly  nebivolol, 5 mg, Oral, Q24H  pantoprazole, 40 mg, Oral, BID AC  predniSONE, 10 mg, Oral, Daily  revefenacin, 175 mcg, Nebulization, Daily - RT  rivaroxaban, 20 mg, Oral, Daily With Dinner  [Held by provider] spironolactone, 25 mg, Oral, Daily  tamsulosin, 0.4 mg, Oral, Daily      Continuous Infusions:   PRN Meds:.  acetaminophen **OR**  acetaminophen **OR** acetaminophen    albuterol    senna-docusate sodium **AND** polyethylene glycol **AND** bisacodyl **AND** bisacodyl    Calcium Replacement - Follow Nurse / BPA Driven Protocol    dextrose    dextrose    glucagon (human recombinant)    Magnesium Standard Dose Replacement - Follow Nurse / BPA Driven Protocol    nitroglycerin    Phosphorus Replacement - Follow Nurse / BPA Driven Protocol    Potassium Replacement - Follow Nurse / BPA Driven Protocol    sodium chloride    sodium chloride    Assessment & Plan   Assessment & Plan     Active Hospital Problems    Diagnosis  POA    **CHF (congestive heart failure) [I50.9]  Yes    Chronic anticoagulation [Z79.01]  Not Applicable    Acute blood loss anemia [D62]  Yes    Sleep apnea [G47.30]  Yes    Hypertension [I10]  Yes    Diabetes mellitus, type II [E11.9]  Yes    COPD (chronic obstructive pulmonary disease) [J44.9]  Yes    Afib [I48.91]  Yes    Hyperkalemia [E87.5]  Yes      Resolved Hospital Problems   No resolved problems to display.        Brief Hospital Course to date:  Jonatan Mcguire is a 75 y.o. male with a PMH significant for atrial fibrillation on Xarelto for anticoagulation, COPD, T2DM, HTN, sleep apnea, obesity, chronic venous stasis bilaterally, OA and BPH.  Presented [facility] ED due to worsening shortness of breath, swelling in lower extremities bilaterally and decline in ADLs.     Acute CHF exacerbation  Decline in ADLs  Exertional dyspnea  Lower extremity edema  HTN  --cardiology consulted- recommending ECHO and b/l LE duplex   --dilt--> nebivolol   --bumex BID   --cards recommending starting spirinolactone and jardiance - will add  --strict I/O , daily weights   --monitor and replace electrolytes    UTI  --4+ bacteria, 21-50WBC  --cx with 25k GNB, will continue cefazolin for now      Persistent atrial fibrillation  On chronic anticoagulation  --continue Bblocker, eliquis      COPD, not in exacerbation  --continue home  inhalers     Sleep apnea    HLD       T2DM  --continue basal/bolus, monitor  --A1C 5.3      Anemia       Bilateral venous stasis dermatitis  Bullous dermatitis  --check duplex  --WOC consult    Expected Discharge Location and Transportation: home  Expected Discharge   Expected Discharge Date: 6/12/2025; Expected Discharge Time:      VTE Prophylaxis:  Pharmacologic & mechanical VTE prophylaxis orders are present.         AM-PAC 6 Clicks Score (PT): 14 (06/10/25 0800)    CODE STATUS:   Code Status and Medical Interventions: CPR (Attempt to Resuscitate); Full Support   Ordered at: 06/09/25 1433     Code Status (Patient has no pulse and is not breathing):    CPR (Attempt to Resuscitate)     Medical Interventions (Patient has pulse or is breathing):    Full Support     Level Of Support Discussed With:    Patient       Claribel Salazar MD  06/10/25

## 2025-06-11 LAB
ALBUMIN SERPL-MCNC: 3.3 G/DL (ref 3.5–5.2)
ALBUMIN/GLOB SERPL: 1.3 G/DL
ALP SERPL-CCNC: 45 U/L (ref 39–117)
ALT SERPL W P-5'-P-CCNC: 13 U/L (ref 1–41)
ANION GAP SERPL CALCULATED.3IONS-SCNC: 10 MMOL/L (ref 5–15)
ANION GAP SERPL CALCULATED.3IONS-SCNC: 7 MMOL/L (ref 5–15)
AST SERPL-CCNC: 20 U/L (ref 1–40)
BACTERIA SPEC AEROBE CULT: ABNORMAL
BASOPHILS # BLD AUTO: 0.02 10*3/MM3 (ref 0–0.2)
BASOPHILS NFR BLD AUTO: 0.2 % (ref 0–1.5)
BILIRUB SERPL-MCNC: 0.4 MG/DL (ref 0–1.2)
BUN SERPL-MCNC: 15.4 MG/DL (ref 8–23)
BUN SERPL-MCNC: 16.1 MG/DL (ref 8–23)
BUN/CREAT SERPL: 15 (ref 7–25)
BUN/CREAT SERPL: 15.4 (ref 7–25)
CALCIUM SPEC-SCNC: 8.8 MG/DL (ref 8.6–10.5)
CALCIUM SPEC-SCNC: 9.2 MG/DL (ref 8.6–10.5)
CHLORIDE SERPL-SCNC: 93 MMOL/L (ref 98–107)
CHLORIDE SERPL-SCNC: 97 MMOL/L (ref 98–107)
CO2 SERPL-SCNC: 35 MMOL/L (ref 22–29)
CO2 SERPL-SCNC: 42 MMOL/L (ref 22–29)
CREAT SERPL-MCNC: 1 MG/DL (ref 0.76–1.27)
CREAT SERPL-MCNC: 1.07 MG/DL (ref 0.76–1.27)
DEPRECATED RDW RBC AUTO: 57.5 FL (ref 37–54)
EGFRCR SERPLBLD CKD-EPI 2021: 71.9 ML/MIN/1.73
EGFRCR SERPLBLD CKD-EPI 2021: 78 ML/MIN/1.73
EOSINOPHIL # BLD AUTO: 0.04 10*3/MM3 (ref 0–0.4)
EOSINOPHIL NFR BLD AUTO: 0.5 % (ref 0.3–6.2)
ERYTHROCYTE [DISTWIDTH] IN BLOOD BY AUTOMATED COUNT: 16.9 % (ref 12.3–15.4)
GLOBULIN UR ELPH-MCNC: 2.5 GM/DL
GLUCOSE BLDC GLUCOMTR-MCNC: 115 MG/DL (ref 70–130)
GLUCOSE BLDC GLUCOMTR-MCNC: 149 MG/DL (ref 70–130)
GLUCOSE BLDC GLUCOMTR-MCNC: 183 MG/DL (ref 70–130)
GLUCOSE BLDC GLUCOMTR-MCNC: 81 MG/DL (ref 70–130)
GLUCOSE SERPL-MCNC: 186 MG/DL (ref 65–99)
GLUCOSE SERPL-MCNC: 68 MG/DL (ref 65–99)
HCT VFR BLD AUTO: 33.4 % (ref 37.5–51)
HGB BLD-MCNC: 9.5 G/DL (ref 13–17.7)
IMM GRANULOCYTES # BLD AUTO: 0.02 10*3/MM3 (ref 0–0.05)
IMM GRANULOCYTES NFR BLD AUTO: 0.2 % (ref 0–0.5)
LYMPHOCYTES # BLD AUTO: 3.49 10*3/MM3 (ref 0.7–3.1)
LYMPHOCYTES NFR BLD AUTO: 41.3 % (ref 19.6–45.3)
MAGNESIUM SERPL-MCNC: 1.8 MG/DL (ref 1.6–2.4)
MCH RBC QN AUTO: 26.2 PG (ref 26.6–33)
MCHC RBC AUTO-ENTMCNC: 28.4 G/DL (ref 31.5–35.7)
MCV RBC AUTO: 92.3 FL (ref 79–97)
MONOCYTES # BLD AUTO: 1 10*3/MM3 (ref 0.1–0.9)
MONOCYTES NFR BLD AUTO: 11.8 % (ref 5–12)
NEUTROPHILS NFR BLD AUTO: 3.88 10*3/MM3 (ref 1.7–7)
NEUTROPHILS NFR BLD AUTO: 46 % (ref 42.7–76)
NRBC BLD AUTO-RTO: 0 /100 WBC (ref 0–0.2)
PLATELET # BLD AUTO: 217 10*3/MM3 (ref 140–450)
PMV BLD AUTO: 9.2 FL (ref 6–12)
POTASSIUM SERPL-SCNC: 3.5 MMOL/L (ref 3.5–5.2)
POTASSIUM SERPL-SCNC: 4.2 MMOL/L (ref 3.5–5.2)
PROT SERPL-MCNC: 5.8 G/DL (ref 6–8.5)
RBC # BLD AUTO: 3.62 10*6/MM3 (ref 4.14–5.8)
SODIUM SERPL-SCNC: 142 MMOL/L (ref 136–145)
SODIUM SERPL-SCNC: 142 MMOL/L (ref 136–145)
WBC NRBC COR # BLD AUTO: 8.45 10*3/MM3 (ref 3.4–10.8)

## 2025-06-11 PROCEDURE — 29581 APPL MULTLAYER CMPRN SYS LEG: CPT

## 2025-06-11 PROCEDURE — 94761 N-INVAS EAR/PLS OXIMETRY MLT: CPT

## 2025-06-11 PROCEDURE — 63710000001 INSULIN GLARGINE PER 5 UNITS

## 2025-06-11 PROCEDURE — 94799 UNLISTED PULMONARY SVC/PX: CPT

## 2025-06-11 PROCEDURE — 80053 COMPREHEN METABOLIC PANEL: CPT | Performed by: INTERNAL MEDICINE

## 2025-06-11 PROCEDURE — 99232 SBSQ HOSP IP/OBS MODERATE 35: CPT | Performed by: INTERNAL MEDICINE

## 2025-06-11 PROCEDURE — 97164 PT RE-EVAL EST PLAN CARE: CPT

## 2025-06-11 PROCEDURE — 63710000001 REVEFENACIN 175 MCG/3ML SOLUTION

## 2025-06-11 PROCEDURE — 25010000002 CEFAZOLIN PER 500 MG: Performed by: INTERNAL MEDICINE

## 2025-06-11 PROCEDURE — 63710000001 PREDNISONE PER 5 MG

## 2025-06-11 PROCEDURE — 85025 COMPLETE CBC W/AUTO DIFF WBC: CPT | Performed by: INTERNAL MEDICINE

## 2025-06-11 PROCEDURE — 63710000001 INSULIN LISPRO (HUMAN) PER 5 UNITS

## 2025-06-11 PROCEDURE — 82948 REAGENT STRIP/BLOOD GLUCOSE: CPT

## 2025-06-11 PROCEDURE — 25010000002 BUMETANIDE PER 0.5 MG: Performed by: NURSE PRACTITIONER

## 2025-06-11 PROCEDURE — 83735 ASSAY OF MAGNESIUM: CPT

## 2025-06-11 RX ORDER — POTASSIUM CHLORIDE 1500 MG/1
40 TABLET, EXTENDED RELEASE ORAL EVERY 4 HOURS
Status: DISCONTINUED | OUTPATIENT
Start: 2025-06-11 | End: 2025-06-11

## 2025-06-11 RX ADMIN — PREDNISONE 10 MG: 10 TABLET ORAL at 09:50

## 2025-06-11 RX ADMIN — BUMETANIDE 1 MG: 0.25 INJECTION INTRAMUSCULAR; INTRAVENOUS at 17:09

## 2025-06-11 RX ADMIN — TAMSULOSIN HYDROCHLORIDE 0.4 MG: 0.4 CAPSULE ORAL at 09:50

## 2025-06-11 RX ADMIN — INSULIN LISPRO 2 UNITS: 100 INJECTION, SOLUTION INTRAVENOUS; SUBCUTANEOUS at 20:40

## 2025-06-11 RX ADMIN — FINASTERIDE 5 MG: 5 TABLET, FILM COATED ORAL at 09:50

## 2025-06-11 RX ADMIN — EMPAGLIFLOZIN 10 MG: 10 TABLET, FILM COATED ORAL at 09:50

## 2025-06-11 RX ADMIN — ATORVASTATIN CALCIUM 10 MG: 10 TABLET, FILM COATED ORAL at 09:50

## 2025-06-11 RX ADMIN — BUMETANIDE 1 MG: 0.25 INJECTION INTRAMUSCULAR; INTRAVENOUS at 09:51

## 2025-06-11 RX ADMIN — RIVAROXABAN 20 MG: 20 TABLET, FILM COATED ORAL at 17:09

## 2025-06-11 RX ADMIN — MONTELUKAST 10 MG: 10 TABLET, FILM COATED ORAL at 20:40

## 2025-06-11 RX ADMIN — BUDESONIDE AND FORMOTEROL FUMARATE DIHYDRATE 2 PUFF: 160; 4.5 AEROSOL RESPIRATORY (INHALATION) at 19:58

## 2025-06-11 RX ADMIN — BUDESONIDE AND FORMOTEROL FUMARATE DIHYDRATE 2 PUFF: 160; 4.5 AEROSOL RESPIRATORY (INHALATION) at 06:46

## 2025-06-11 RX ADMIN — PANTOPRAZOLE SODIUM 40 MG: 40 TABLET, DELAYED RELEASE ORAL at 17:09

## 2025-06-11 RX ADMIN — CEFAZOLIN 1000 MG: 1 INJECTION, POWDER, FOR SOLUTION INTRAMUSCULAR; INTRAVENOUS at 20:40

## 2025-06-11 RX ADMIN — CEFAZOLIN 1000 MG: 1 INJECTION, POWDER, FOR SOLUTION INTRAMUSCULAR; INTRAVENOUS at 04:46

## 2025-06-11 RX ADMIN — PANTOPRAZOLE SODIUM 40 MG: 40 TABLET, DELAYED RELEASE ORAL at 09:50

## 2025-06-11 RX ADMIN — CEFAZOLIN 1000 MG: 1 INJECTION, POWDER, FOR SOLUTION INTRAMUSCULAR; INTRAVENOUS at 12:08

## 2025-06-11 RX ADMIN — NEBIVOLOL 5 MG: 5 TABLET ORAL at 09:50

## 2025-06-11 RX ADMIN — INSULIN GLARGINE 10 UNITS: 100 INJECTION, SOLUTION SUBCUTANEOUS at 20:40

## 2025-06-11 RX ADMIN — REVEFENACIN 175 MCG: 175 SOLUTION RESPIRATORY (INHALATION) at 06:46

## 2025-06-11 RX ADMIN — ALBUTEROL SULFATE 2.5 MG: 2.5 SOLUTION RESPIRATORY (INHALATION) at 04:59

## 2025-06-11 RX ADMIN — SPIRONOLACTONE 25 MG: 25 TABLET ORAL at 09:50

## 2025-06-11 NOTE — PROGRESS NOTES
[facility] Medicine Services  PROGRESS NOTE    Patient Name: Jonatan Mcguire  : 1950  MRN: [id number]    Date of Admission: 2025  Primary Care Physician: Cheli Damico MD    Subjective   Subjective     CC:  Chf     HPI:  Patient is doing ok, sitting up in chair. He reports improvement in his swelling. Asking when PT wound care will be by as he is concerned about ongoing weeping and bleeding from his LE wounds.     ROS  As above      Objective   Objective     Vital Signs:   Temp:  [97.6 °F (36.4 °C)-98.3 °F (36.8 °C)] 98.3 °F (36.8 °C)  Heart Rate:  [57-77] 62  Resp:  [18-20] 18  BP: (105-126)/(59-94) 105/74  Flow (L/min) (Oxygen Therapy):  [1-2] 1     Physical Exam:  GEN: NAD, resting in bed, awake  HEENT: on 3L, atraumatic, normocephalic  NECK: supple, no masses  RESP: on 3L, normal effort  CV: on tele, sinus rhythm  PSYCH: normal affect, appropriate  NEURO: awake, alert, no focal deficits noted  MSK: edema noted and bullous dermatitis noted on b/l LE,patient with open wounds present on RLE - bandaged   SKIN: no rashes noted       Results Reviewed:  LAB RESULTS:      Lab 25  0536 06/10/25  0545 25  1129 25  0948   WBC 8.45 5.38  --  7.63   HEMOGLOBIN 9.5* 8.5*  --  9.5*   HEMATOCRIT 33.4* 29.9*  --  33.5*   PLATELETS 217 207  --  218   NEUTROS ABS 3.88  --   --  5.98   IMMATURE GRANS (ABS) 0.02  --   --  0.03   LYMPHS ABS 3.49*  --   --  1.04   MONOS ABS 1.00*  --   --  0.53   EOS ABS 0.04  --   --  0.03   MCV 92.3 92.9  --  93.8   CRP  --   --  1.06*  --    PROCALCITONIN  --   --  0.07  --    LACTATE  --   --   --  1.4   PROTIME  --   --   --  24.7*   D DIMER QUANT  --   --   --  1.35*   HSTROP T  --   --  142* 144*         Lab 25  0536 06/10/25  1305 06/10/25  0545 25  1129 25  0948   SODIUM 142  --  143  --  143   POTASSIUM 3.5 4.4 3.8  --  3.8   CHLORIDE 97*  --  101  --  103   CO2 35.0*  --  34.0*  --  29.0   ANION GAP 10.0   --  8.0  --  11.0   BUN 15.4  --  14.8  --  16.6   CREATININE 1.00  --  0.87  --  0.92   EGFR 78.0  --  89.4  --  86.2   GLUCOSE 68  --  61*  --  87   CALCIUM 8.8  --  8.4*  --  8.9   MAGNESIUM 1.8  --  1.3*  --   --    HEMOGLOBIN A1C  --   --   --   --  5.30   TSH  --   --   --  1.590  --          Lab 06/11/25  0536 06/09/25  0948   TOTAL PROTEIN 5.8* 6.3   ALBUMIN 3.3* 3.5   GLOBULIN 2.5 2.8   ALT (SGPT) 13 20   AST (SGOT) 20 25   BILIRUBIN 0.4 0.5   ALK PHOS 45 49         Lab 06/09/25  1129 06/09/25  0948   PROBNP  --  1,415.0   HSTROP T 142* 144*   PROTIME  --  24.7*   INR  --  2.07*         Lab 06/09/25  1129   CHOLESTEROL 97   LDL CHOL 30   HDL CHOL 54   TRIGLYCERIDES 52         Lab 06/09/25  1129   IRON 15*   IRON SATURATION (TSAT) 4*   TIBC 392   TRANSFERRIN 263   FERRITIN 37.30         Lab 06/09/25  1512   FIO2 28   CARBOXYHEMOGLOBIN (VENOUS) 1.6     Brief Urine Lab Results  (Last result in the past 365 days)        Color   Clarity   Blood   Leuk Est   Nitrite   Protein   CREAT   Urine HCG        06/09/25 1044 Yellow   Clear   Negative   Small (1+)   Negative   Negative                   Microbiology Results Abnormal       Procedure Component Value - Date/Time    Urine Culture - Urine, Urine, Clean Catch [[id number]]  (Abnormal)  (Susceptibility) Collected: 06/09/25 1533    Lab Status: Final result Specimen: Urine, Clean Catch Updated: 06/11/25 0825     Urine Culture 25,000 CFU/mL Klebsiella pneumoniae ssp pneumoniae    Narrative:      Colonization of the urinary tract without infection is common. Treatment is discouraged unless the patient is symptomatic, pregnant, or undergoing an invasive urologic procedure.    Susceptibility        Klebsiella pneumoniae ssp pneumoniae      PAUL      Amoxicillin + Clavulanate Susceptible      Ampicillin Resistant      Ampicillin + Sulbactam Susceptible      Cefazolin (Urine) Susceptible      Cefepime Susceptible      Ceftazidime Susceptible      Ceftriaxone Susceptible       Cefuroxime axetil Susceptible      Gentamicin Susceptible      Levofloxacin Susceptible      Nitrofurantoin Intermediate      Piperacillin + Tazobactam Susceptible      Trimethoprim + Sulfamethoxazole Susceptible                                   Duplex Venous Lower Extremity - Bilateral CAR  Result Date: 6/10/2025    Normal bilateral lower extremity venous duplex scan.   Right sided additional findings: Unable to visualize the peroneal veins       Results for orders placed during the hospital encounter of 06/09/25    Adult Transthoracic Echo Complete W/ Cont if Necessary Per Protocol    Interpretation Summary    Left ventricular systolic function is normal. Calculated left ventricular EF = 67.2% Normal left ventricular cavity size noted. Left ventricular wall thickness is consistent with mild concentric hypertrophy. Elevated left atrial pressure.    The right ventricular cavity is mild to moderately dilated. Normal right ventricular systolic function noted.    Left atrial volume is severely increased.    There is moderate calcification of the aortic valve. No aortic valve regurgitation is present. Mild aortic valve stenosis is present. Aortic valve area is 1.49 cm2. Peak velocity of the flow distal to the aortic valve is 259.2 cm/s. Aortic valve mean pressure gradient is 14.3 mmHg.    Mitral annular calcification is present. Mild mitral valve regurgitation is present. No significant mitral valve stenosis is present.    Mild to moderate tricuspid valve regurgitation is present. Estimated right ventricular systolic pressure from tricuspid regurgitation is moderately elevated (45-55 mmHg).      Current medications:  Scheduled Meds:atorvastatin, 10 mg, Oral, Daily  budesonide-formoterol, 2 puff, Inhalation, BID - RT  bumetanide, 1 mg, Intravenous, Q12H  ceFAZolin, 1,000 mg, Intravenous, Q8H  empagliflozin, 10 mg, Oral, Daily  finasteride, 5 mg, Oral, Daily  insulin glargine, 10 Units, Subcutaneous, Nightly  insulin  lispro, 2-7 Units, Subcutaneous, 4x Daily AC & at Bedtime  montelukast, 10 mg, Oral, Nightly  nebivolol, 5 mg, Oral, Q24H  pantoprazole, 40 mg, Oral, BID AC  pharmacy consult - MTM, , Not Applicable, Daily  predniSONE, 10 mg, Oral, Daily  revefenacin, 175 mcg, Nebulization, Daily - RT  rivaroxaban, 20 mg, Oral, Daily With Dinner  spironolactone, 25 mg, Oral, Daily  tamsulosin, 0.4 mg, Oral, Daily      Continuous Infusions:   PRN Meds:.  acetaminophen **OR** acetaminophen **OR** acetaminophen    albuterol    senna-docusate sodium **AND** polyethylene glycol **AND** bisacodyl **AND** bisacodyl    Calcium Replacement - Follow Nurse / BPA Driven Protocol    dextrose    dextrose    glucagon (human recombinant)    Magnesium Standard Dose Replacement - Follow Nurse / BPA Driven Protocol    nitroglycerin    Phosphorus Replacement - Follow Nurse / BPA Driven Protocol    Potassium Replacement - Follow Nurse / BPA Driven Protocol    sodium chloride    sodium chloride    Assessment & Plan   Assessment & Plan     Active Hospital Problems    Diagnosis  POA    **CHF (congestive heart failure) [I50.9]  Yes    Chronic anticoagulation [Z79.01]  Not Applicable    Acute blood loss anemia [D62]  Yes    Sleep apnea [G47.30]  Yes    Hypertension [I10]  Yes    Diabetes mellitus, type II [E11.9]  Yes    COPD (chronic obstructive pulmonary disease) [J44.9]  Yes    Afib [I48.91]  Yes    Hyperkalemia [E87.5]  Yes      Resolved Hospital Problems   No resolved problems to display.        Brief Hospital Course to date:  Jonatan Mcguire is a 75 y.o. male with a PMH significant for atrial fibrillation on Xarelto for anticoagulation, COPD, T2DM, HTN, sleep apnea, obesity, chronic venous stasis bilaterally, OA and BPH.  Presented [facility] ED due to worsening shortness of breath, swelling in lower extremities bilaterally and decline in ADLs.     Acute CHF exacerbation  Decline in ADLs  Exertional dyspnea  Lower extremity  edema  HTN  --cardiology consulted- recommending ECHO and b/l LE duplex - duplex negative and ECHO with EF 67%  --dilt--> nebivolol   --bumex BID   --cards recommending starting spirinolactone and jardiance - have added-watch renal fxn and electrolytes closely with this  --strict I/O , daily weights   --monitor and replace electrolytes  --cardiology adjusting medications and consideration of PET MPS in next 1-2 days     UTI  --4+ bacteria, 21-50WBC  --cx with 25k Kleb, will stop abx as no symptoms     Persistent atrial fibrillation  On chronic anticoagulation  --continue Bblocker, eliquis      COPD, not in exacerbation  --continue home inhalers     Sleep apnea    HLD       T2DM  --continue basal/bolus, monitor  --A1C 5.3      Anemia       Bilateral venous stasis dermatitis  Bullous dermatitis  --duplex negative  --WOC consult- PT wound to place unna boots today     Expected Discharge Location and Transportation: home  Expected Discharge   Expected Discharge Date: 6/12/2025; Expected Discharge Time:      VTE Prophylaxis:  Pharmacologic & mechanical VTE prophylaxis orders are present.         AM-PAC 6 Clicks Score (PT): 18 (06/10/25 2054)    CODE STATUS:   Code Status and Medical Interventions: CPR (Attempt to Resuscitate); Full Support   Ordered at: 06/09/25 1433     Code Status (Patient has no pulse and is not breathing):    CPR (Attempt to Resuscitate)     Medical Interventions (Patient has pulse or is breathing):    Full Support     Level Of Support Discussed With:    Patient       Claribel Salazar MD  06/11/25

## 2025-06-11 NOTE — PLAN OF CARE
Goal Outcome Evaluation:  Plan of Care Reviewed With: patient           Outcome Evaluation: Pt presents with significant BLE edema and open, draining wound now improved since admission. PT applied light compression with multilayered compression wrapping to help increase venous return to improve skin integrity and healing potential.                              MEDICATION APPEAL APPROVED    Medication: WEGOVY 0.25 MG/0.5ML SC SOAJ  Authorization Effective Date: 11/3/2024  Authorization Expiration Date: 12/3/2025   [employer]: [employer]  [facility]: [id number] [place] [address] [address] AT [facility]  Patient Notified: YES (faxed approval letter to pharmacy and notified patient via mychart message)  Comments:

## 2025-06-11 NOTE — THERAPY RE-EVALUATION
Acute Care - Wound/Debridement Initial Evaluation  [facility]     Patient Name: Jonatan Mcguire  : 1950  MRN: [id number]  Today's Date: 2025                Admit Date: 2025    Visit Dx:    ICD-10-CM ICD-9-CM   1. Acute on chronic congestive heart failure, unspecified heart failure type  I50.9 428.0   2. Peripheral edema  R60.0 782.3   3. Stasis dermatitis of both legs  I87.2 454.1   4. Chronic anemia  D64.9 285.9   5. Chronic anticoagulation  Z79.01 V58.61   6. Chronic atrial fibrillation  I48.20 427.31   7. Bacteriuria with pyuria  R82.71 791.9    R82.81    8. Congestive heart failure, unspecified HF chronicity, unspecified heart failure type  I50.9 428.0             Patient Active Problem List   Diagnosis    Hyperkalemia    COPD (chronic obstructive pulmonary disease)    Afib    Chronic anticoagulation    Diabetes mellitus, type II    Sleep apnea    Hypertension    Acute blood loss anemia    CHF (congestive heart failure)        Past Medical History:   Diagnosis Date    Afib     COPD (chronic obstructive pulmonary disease)     Diabetes mellitus, type II     Hypertension     Obesity     Sleep apnea     Vitamin D deficiency         Past Surgical History:   Procedure Laterality Date    COLON RESECTION      COLONOSCOPY N/A 2025    Procedure: COLONOSCOPY;  Surgeon: Beau Mcclain MD;  Location: [facility] ENDOSCOPY;  Service: Gastroenterology;  Laterality: N/A;    NASAL POLYP EXCISION             Wound 25 1032 Right anterior ankle (Active)   Dressing Removed Type gauze 06/10/25 2054   Drainage Amount moderate 25 0600   Dressing Care dressing changed 25 0600   Periwound Care dry periwound area maintained 06/10/25 205       Wound 25 1830 Right lower leg Vascular Ulcer (Active)   Wound Image    25 1330   Pressure Injury Stage 2 06/10/25 2054   Dressing Appearance intact;moist drainage 25 1330   Dressing Removed Type gauze 25 1330   Confirmed Empty Wound Bed Yes,  visual inspection of wound bed 06/11/25 1330   Closure None 06/10/25 2054   Base moist;pink;red 06/11/25 1330   Periwound intact;dry 06/11/25 1330   Periwound Temperature warm 06/11/25 1330   Periwound Skin Turgor soft 06/11/25 1330   Edges irregular 06/11/25 1330   Wound Length (cm) 2 cm 06/11/25 1330   Wound Width (cm) 1.5 cm 06/11/25 1330   Wound Depth (cm) 0.1 cm 06/11/25 1330   Wound Surface Area (cm^2) 2.36 cm^2 06/11/25 1330   Wound Volume (cm^3) 0.157 cm^3 06/11/25 1330   Drainage Characteristics/Odor serous 06/11/25 1330   Drainage Amount moderate 06/11/25 1330   Care, Wound irrigated with;wound cleanser 06/11/25 1330   Dressing Care foam;low-adherent;silver impregnated 06/11/25 1330   Periwound Care dry periwound area maintained 06/11/25 1330       Wound 06/09/25 1830 Right anterior greater trochanter Other (Comments) (Active)   Dressing Appearance open to air 06/10/25 2054   Periwound dry;warm 06/10/25 2054   Periwound Temperature warm 06/10/25 2054   Periwound Skin Turgor soft 06/10/25 2054   Drainage Amount none 06/10/25 2054   Periwound Care dry periwound area maintained 06/10/25 2054      Lymphedema       Row Name 06/11/25 1330             Lymphedema Edema Assessment    Ptting Edema Category By severity  -MF      Pitting Edema Moderate  -MF         Skin Changes/Observations    Location/Assessment Lower Extremity  -      Lower Extremity Conditions bilateral:;scaly;crust  -MF      Lower Extremity Color/Pigment bilateral:;hyperpigmented  -MF         Lymphedema Pulses/Capillary Refill    Lymphedema Pulses/Capillary Refill lower extremity pulses;capillary refill  -      Dorsalis Pedis Pulse right:;left:;+2 normal  -MF      Capillary Refill lower extremity capillary refill  -MF      Lower Extremity Capillary Refill right:;left:;less than 3 seconds  -MF         Lymphedema Measurements    Measurement Type(s) Quick Girth  -MF      Quick Girth Areas Lower extremities  -         LLE Quick Girth (cm)    Mid  foot 26 cm  -MF      Smallest ankle 26 cm  -MF      Largest calf 40.5 cm  -MF         RLE Quick Girth (cm)    Mid foot 26 cm  -MF      Smallest ankle 29 cm  -MF      Largest calf 41 cm  -MF      RLE Quick Girth Total 96  -MF         Compression/Skin Care    Compression/Skin Care skin care;wrapping location;bandaging  -MF      Skin Care washed/dried;lotion applied  -MF      Wrapping Location lower extremity  -MF      Wrapping Location LE bilateral:;foot to knee  -MF      Wrapping Comments size 4/5 compressogrip doubled and overlapping for gradient compression.  -                User Key  (r) = Recorded By, (t) = Taken By, (c) = Cosigned By      Initials Name Provider Type    MF Duane Cespedes, PT Physical Therapist                    WOUND DEBRIDEMENT                     PT Assessment (Last 12 Hours)       PT Evaluation and Treatment       Row Name 06/11/25 1330          Physical Therapy Time and Intention    Subjective Information complains of;weakness;fatigue  -MF     Document Type evaluation;therapy note (daily note);wound care  -     Mode of Treatment individual therapy;physical therapy  -       Row Name 06/11/25 1330          General Information    Patient Profile Reviewed yes  -MF     Patient Observations alert;cooperative;agree to therapy  -     Pertinent History of Current Functional Problem BLE edema with open wound to R LE  -MF     Risks Reviewed patient:;increased discomfort  -     Benefits Reviewed patient:;improve skin integrity  -     Barriers to Rehab medically complex;previous functional deficit;physical barrier  -       Row Name 06/11/25 1330          Pain    Pretreatment Pain Rating 0/10 - no pain  -MF     Posttreatment Pain Rating 0/10 - no pain  -       Row Name 06/11/25 1330          Cognition    Affect/Mental Status (Cognition) WNL  -       Row Name             Wound 06/09/25 1032 Right anterior ankle    Wound - Properties Group Placement Date: 06/09/25  -SN Placement Time:  1032  -SN Side: Right  -SN Orientation: anterior  -SN Location: ankle  -SN    Retired Wound - Properties Group Placement Date: 06/09/25  -SN Placement Time: 1032  -SN Side: Right  -SN Orientation: anterior  -SN Location: ankle  -SN    Retired Wound - Properties Group Placement Date: 06/09/25  -SN Placement Time: 1032  -SN Side: Right  -SN Orientation: anterior  -SN Location: ankle  -SN    Retired Wound - Properties Group Date first assessed: 06/09/25  -SN Time first assessed: 1032  -SN Side: Right  -SN Location: ankle  -SN      Row Name 06/11/25 1330          Wound 06/09/25 1830 Right lower leg Vascular Ulcer    Wound - Properties Group Placement Date: 06/09/25  -VS Placement Time: 1830 -VS Side: Right  -VS Orientation: lower  -VS Location: leg  -VS Primary Wound Type: Vascular Ulc  -VS    Wound Image Images linked: 2  -MF     Dressing Appearance intact;moist drainage  -MF     Dressing Removed Type gauze  ABD  -MF     Confirmed Empty Wound Bed Yes, visual inspection of wound bed  -MF     Base moist;pink;red  -MF     Periwound intact;dry  -MF     Periwound Temperature warm  -MF     Periwound Skin Turgor soft  -MF     Edges irregular  -MF     Wound Length (cm) 2 cm  -MF     Wound Width (cm) 1.5 cm  -MF     Wound Depth (cm) 0.1 cm  -MF     Wound Surface Area (cm^2) 2.36 cm^2  -MF     Wound Volume (cm^3) 0.157 cm^3  -MF     Drainage Characteristics/Odor serous  -MF     Drainage Amount moderate  -MF     Care, Wound irrigated with;wound cleanser  -MF     Dressing Care foam;low-adherent;silver impregnated  beata Ag to base with mepilex Ag foam and optifoam to cover  -MF     Periwound Care dry periwound area maintained  -MF     Retired Wound - Properties Group Placement Date: 06/09/25  -VS Placement Time: 1830 -VS Side: Right  -VS Orientation: lower  -VS Location: leg  -VS    Retired Wound - Properties Group Placement Date: 06/09/25  -VS Placement Time: 1830 -VS Side: Right  -VS Orientation: lower  -VS Location: leg   -VS    Retired Wound - Properties Group Date first assessed: 06/09/25  -VS Time first assessed: 1830  -VS Side: Right  -VS Location: leg  -VS      Row Name             Wound 06/09/25 1830 Right anterior greater trochanter Other (Comments)    Wound - Properties Group Placement Date: 06/09/25  -VS Placement Time: 1830  -VS Side: Right  -VS Orientation: anterior  -VS Location: greater trochanter  -VS, Right groin  Primary Wound Type: Other  -VS Secondary Wound Type - Other: --  -VS, excoriation     Retired Wound - Properties Group Placement Date: 06/09/25  -VS Placement Time: 1830  -VS Side: Right  -VS Orientation: anterior  -VS Location: greater trochanter  -VS, Right groin     Retired Wound - Properties Group Placement Date: 06/09/25  -VS Placement Time: 1830  -VS Side: Right  -VS Orientation: anterior  -VS Location: greater trochanter  -VS, Right groin     Retired Wound - Properties Group Date first assessed: 06/09/25  -VS Time first assessed: 1830  -VS Side: Right  -VS Location: greater trochanter  -VS, Right groin       Row Name 06/11/25 1330          Coping    Observed Emotional State calm;cooperative  -     Verbalized Emotional State acceptance  -     Trust Relationship/Rapport care explained  -       Row Name 06/11/25 1332          Plan of Care Review    Plan of Care Reviewed With patient  -     Outcome Evaluation Pt presents with significant BLE edema and open, draining wound now improved since admission. PT applied light compression with multilayered compression wrapping to help increase venous return to improve skin integrity and healing potential.  -       Row Name 06/11/25 1330          Positioning and Restraints    Pre-Treatment Position sitting in chair/recliner  -     Post Treatment Position chair  -MF     In Chair reclined;call light within reach  -       Row Name 06/11/25 0046          Therapy Assessment/Plan (PT)    Patient/Family Therapy Goals Statement (PT) go home, wound healing.   -     PT Diagnosis (PT) RLE wound with moderate BLE edema  -     Rehab Potential (PT) good  -     Criteria for Skilled Interventions Met (PT) yes;meets criteria;skilled treatment is necessary  -     Predicted Duration of Therapy Intervention (PT) 10 days  -       Row Name 06/11/25 1330          Therapy Plan Review/Discharge Plan (PT)    Therapy Plan Review (PT) evaluation/treatment results reviewed;care plan/treatment goals reviewed;risks/benefits reviewed;current/potential barriers reviewed;participants voiced agreement with care plan;participants included;patient  -       Row Name 06/11/25 1330          Physical Therapy Goals    Wound Management Goal Selection (PT) wound management, PT goal 1;wound management, PT goal 2  -       Row Name 06/11/25 1330          Wound Management Goal 1 (PT)    Wound Management Goal (Wound Goal 1, PT) Decrease BLE edema to minimal to improve skin integrity and healing potential.  -     Time Frame (Wound Goal 1, PT) 1 week  -       Row Name 06/11/25 1330          Wound Management Goal 2 (PT)    Wound Management Goal (Wound Goal 2, PT) Pt to have no open wounds to LEs to allow for improved skin integrity and transition to compression stockings  -     Time Frame (Wound Goal 2, PT) 2 weeks  -               User Key  (r) = Recorded By, (t) = Taken By, (c) = Cosigned By      Initials Name Provider Type     Duane Cespedes, PT Physical Therapist    Eileen Lui, RN Registered Nurse    VS Janelle Duenas RN Registered Nurse                  Physical Therapy Education       Title: PT OT SLP Therapies (In Progress)       Topic: Physical Therapy (In Progress)       Point: Mobility training (Done)       Learning Progress Summary            Patient Acceptance, E, VU by MB at 6/10/2025 1156                      Point: Home exercise program (Not Started)       Learner Progress:  Not documented in this visit.              Point: Body mechanics (Done)        Learning Progress Summary            Patient Acceptance, E, VU by MB at 6/10/2025 1156                      Point: Precautions (Done)       Learning Progress Summary            Patient Acceptance, E, VU by MB at 6/10/2025 1156                                      User Key       Initials Effective Dates Name Provider Type Discipline    MB 05/30/25 -  Fanny Bae, PT Physical Therapist PT                    Recommendation and Plan  Anticipated Equipment Needs at Discharge (PT): other (see comments) (Patient already has necessary DME for use at home)  Anticipated Discharge Disposition (PT): home with assist, home with home health  Planned Therapy Interventions (PT): wound care  Therapy Frequency (PT): daily  Plan of Care Reviewed With: patient           Outcome Evaluation: Pt presents with significant BLE edema and open, draining wound now improved since admission. PT applied light compression with multilayered compression wrapping to help increase venous return to improve skin integrity and healing potential.  Plan of Care Reviewed With: patient            Time Calculation   PT Charges       Row Name 06/11/25 1330             Time Calculation    Start Time 1330  -MF      PT Goal Re-Cert Due Date 06/20/25  -MF         Untimed Charges    PT Eval/Re-eval Minutes 25  -MF      Wound Care 12527 Multilayer comp below knee  -MF      [id number] comp below knee 25  -MF         Total Minutes    Untimed Charges Total Minutes 50  -MF       Total Minutes 50  -MF                User Key  (r) = Recorded By, (t) = Taken By, (c) = Cosigned By      Initials Name Provider Type     Duane Cespedes, PT Physical Therapist                      Therapy Charges for Today       Code Description Service Date Service Provider Modifiers Qty    47808976809 HC PT MULTI LAYER COMP SYS BELOW KNEE 6/11/2025 Duane Cespedes, PT GP 1    00838201788 HC PT RE-EVAL ESTABLISHED PLAN 2 6/11/2025 Duane Cespedes, PT GP 1              PT  G-Codes  Outcome Measure Options: AM-PAC 6 Clicks Daily Activity (OT)  AM-PAC 6 Clicks Score (PT): 18  AM-PAC 6 Clicks Score (OT): 18       Duane Cespedes, PT  6/11/2025

## 2025-06-11 NOTE — PROGRESS NOTES
Jonatan Mcguire  [id number]  1950   LOS: 2 days   Patient Care Team:  Cheli Damico MD as PCP - General (Family Medicine)  Provider, No Known as PCP - Family Medicine    Chief Complaint:  SEVERE ANEMIA / COPD & CHRONIC O2 & MILD-MODERATE PH / AS / HFpEF / CHRONIC AFIB / SEVERE BLE VENOUS INSUFFICIENCY / HTN / UTI / T2 DM     Subjective     Up in chair on 2 L/min nasal cannula (oximetry 96%).  Better spirits.  States he has less tachypnea and dyspnea and less pedal edema.  He remains concerned about the right lower extremity leg wraps and questions when wound care will return.  He denies fever, chills, dysuria, cough, sputum production, pleurisy, or hemoptysis.  No headache or focal motor-sensory changes. Fair appetite.    Objective     Vital Sign Min/Max for last 24 hours  Temp  Min: 98 °F (36.7 °C)  Max: 98.3 °F (36.8 °C)   BP  Min: 124/94  Max: 137/79   Pulse  Min: 57  Max: 90   Resp  Min: 18  Max: 22   SpO2  Min: 88 %  Max: 100 %      Weight  Min: 104 kg (228 lb 9.6 oz)  Max: 104 kg (228 lb 9.6 oz)         06/10/25  0500 06/11/25  0604   Weight: 109 kg (240 lb 9.6 oz) 104 kg (228 lb 9.6 oz)         Intake/Output Summary (Last 24 hours) at 6/11/2025 0727  Last data filed at 6/11/2025 0500  Gross per 24 hour   Intake 800 ml   Output 5075 ml   Net -4275 ml       Physical Exam:     General Appearance:    Alert, cooperative, in no acute distress   Lungs:   Bibasilar crackles,respirations regular, even and                unlabored    Heart:    Irregular and normal rate, variable S1 and S2, grade 2/6 systolic murmur, no gallop, no rub, no click   Abdomen:  Extremities:   Soft, nontender, bowel sounds audible x4  2+ BLE edema, normal range of motion   Pulses:   Pulses palpable and equal bilaterally      Results Review:   Results from last 7 days   Lab Units 06/11/25  0536 06/10/25  1305 06/10/25  0545 06/09/25  0948   SODIUM mmol/L 142  --  143 143   POTASSIUM mmol/L 3.5 4.4 3.8 3.8   CHLORIDE mmol/L 97*   --  101 103   CO2 mmol/L 35.0*  --  34.0* 29.0   BUN mg/dL 15.4  --  14.8 16.6   CREATININE mg/dL 1.00  --  0.87 0.92   GLUCOSE mg/dL 68  --  61* 87   CALCIUM mg/dL 8.8  --  8.4* 8.9     Results from last 7 days   Lab Units 06/11/25  0536 06/10/25  0545 06/09/25  0948   WBC 10*3/mm3 8.45 5.38 7.63   HEMOGLOBIN g/dL 9.5* 8.5* 9.5*   HEMATOCRIT % 33.4* 29.9* 33.5*   PLATELETS 10*3/mm3 217 207 218     Results from last 7 days   Lab Units 06/09/25  1129   CHOLESTEROL mg/dL 97   TRIGLYCERIDES mg/dL 52   HDL CHOL mg/dL 54   LDL CHOL mg/dL 30     Results from last 7 days   Lab Units 06/09/25  0948   HEMOGLOBIN A1C % 5.30     Results from last 7 days   Lab Units 06/09/25  1129 06/09/25  0948   CK TOTAL U/L 157  --    HSTROP T ng/L 142* 144*     ALBUMIN: 3.3    LFTs: WNL     MAGNESIUM: 1.8    ACCU-CHEKS: 153 - 136 - 68 MG/DL.    BLE VENOUS DUPLEX:      Normal bilateral lower extremity venous duplex scan.    Right sided additional findings: Unable to visualize the peroneal veins    NO NEW CXR / EKG.    Medication Review: REVIEWED; NO DRIPS.    Assessment & Plan     Afebrile with acceptable hemodynamics and persistent chronic atrial fibrillation with excellent diuresis and stable GFR with improved serum magnesium level.  Will continue current cardiac medications and consider PET MPS in the next 1 to 2 days on the day of discharge to complete his cardiac evaluation.  Postdischarge he will resume his cardiology care with his [facility] cardiologist. Net diuresis 10.3 L since admission to hospital.  I suspect he will need Unna boots and outpatient wound care.      CHF (congestive heart failure)    Hyperkalemia    COPD (chronic obstructive pulmonary disease)    Afib    Chronic anticoagulation    Diabetes mellitus, type II    Sleep apnea    Hypertension    Acute blood loss anemia    06/11/25  07:27 EDT

## 2025-06-11 NOTE — CASE MANAGEMENT/SOCIAL WORK
Continued Stay Note  [facility]     Patient Name: Jonatan Mcguire  MRN: [id number]  Today's Date: 6/11/2025    Admit Date: 6/9/2025    Plan: update   Discharge Plan       Row Name 06/11/25 1218       Plan    Plan update    Patient/Family in Agreement with Plan yes    Plan Comments Spoke with patient at bedside regarding discharge plan.  Patient indicates he was told possibly home Thursday or Friday, stuck his leg out and advised CM he will need HH.  Pateint denies preference of HH agencies.  No other needs verbalized.  Orders placed, called [facility] who accepted patient and will follow.  CM following.  Patient plan is to discharge home with [facility].    Final Discharge Disposition Code 06 - home with home health care                   Discharge Codes    No documentation.                 Expected Discharge Date and Time       Expected Discharge Date Expected Discharge Time    Jun 12, 2025               Radha Mcallister RN

## 2025-06-12 LAB
ALBUMIN SERPL-MCNC: 3.1 G/DL (ref 3.5–5.2)
ALBUMIN/GLOB SERPL: 1.2 G/DL
ALP SERPL-CCNC: 43 U/L (ref 39–117)
ALT SERPL W P-5'-P-CCNC: 9 U/L (ref 1–41)
ANION GAP SERPL CALCULATED.3IONS-SCNC: 7 MMOL/L (ref 5–15)
AST SERPL-CCNC: 20 U/L (ref 1–40)
BASOPHILS # BLD AUTO: 0.03 10*3/MM3 (ref 0–0.2)
BASOPHILS NFR BLD AUTO: 0.3 % (ref 0–1.5)
BILIRUB SERPL-MCNC: 0.3 MG/DL (ref 0–1.2)
BUN SERPL-MCNC: 18.6 MG/DL (ref 8–23)
BUN/CREAT SERPL: 19 (ref 7–25)
CALCIUM SPEC-SCNC: 8.5 MG/DL (ref 8.6–10.5)
CHLORIDE SERPL-SCNC: 94 MMOL/L (ref 98–107)
CO2 SERPL-SCNC: 42 MMOL/L (ref 22–29)
CREAT SERPL-MCNC: 0.98 MG/DL (ref 0.76–1.27)
DEPRECATED RDW RBC AUTO: 56.7 FL (ref 37–54)
EGFRCR SERPLBLD CKD-EPI 2021: 79.9 ML/MIN/1.73
EOSINOPHIL # BLD AUTO: 0.04 10*3/MM3 (ref 0–0.4)
EOSINOPHIL NFR BLD AUTO: 0.4 % (ref 0.3–6.2)
ERYTHROCYTE [DISTWIDTH] IN BLOOD BY AUTOMATED COUNT: 16.6 % (ref 12.3–15.4)
GLOBULIN UR ELPH-MCNC: 2.5 GM/DL
GLUCOSE BLDC GLUCOMTR-MCNC: 155 MG/DL (ref 70–130)
GLUCOSE BLDC GLUCOMTR-MCNC: 157 MG/DL (ref 70–130)
GLUCOSE BLDC GLUCOMTR-MCNC: 210 MG/DL (ref 70–130)
GLUCOSE BLDC GLUCOMTR-MCNC: 80 MG/DL (ref 70–130)
GLUCOSE SERPL-MCNC: 65 MG/DL (ref 65–99)
HCT VFR BLD AUTO: 33 % (ref 37.5–51)
HGB BLD-MCNC: 9.3 G/DL (ref 13–17.7)
IMM GRANULOCYTES # BLD AUTO: 0.03 10*3/MM3 (ref 0–0.05)
IMM GRANULOCYTES NFR BLD AUTO: 0.3 % (ref 0–0.5)
LYMPHOCYTES # BLD AUTO: 4.53 10*3/MM3 (ref 0.7–3.1)
LYMPHOCYTES NFR BLD AUTO: 49.6 % (ref 19.6–45.3)
MAGNESIUM SERPL-MCNC: 1.4 MG/DL (ref 1.6–2.4)
MCH RBC QN AUTO: 26 PG (ref 26.6–33)
MCHC RBC AUTO-ENTMCNC: 28.2 G/DL (ref 31.5–35.7)
MCV RBC AUTO: 92.2 FL (ref 79–97)
MONOCYTES # BLD AUTO: 0.98 10*3/MM3 (ref 0.1–0.9)
MONOCYTES NFR BLD AUTO: 10.7 % (ref 5–12)
NEUTROPHILS NFR BLD AUTO: 3.53 10*3/MM3 (ref 1.7–7)
NEUTROPHILS NFR BLD AUTO: 38.7 % (ref 42.7–76)
NRBC BLD AUTO-RTO: 0 /100 WBC (ref 0–0.2)
PLATELET # BLD AUTO: 238 10*3/MM3 (ref 140–450)
PMV BLD AUTO: 10 FL (ref 6–12)
POTASSIUM SERPL-SCNC: 3.4 MMOL/L (ref 3.5–5.2)
POTASSIUM SERPL-SCNC: 4.7 MMOL/L (ref 3.5–5.2)
PROT SERPL-MCNC: 5.6 G/DL (ref 6–8.5)
RBC # BLD AUTO: 3.58 10*6/MM3 (ref 4.14–5.8)
SODIUM SERPL-SCNC: 143 MMOL/L (ref 136–145)
WBC NRBC COR # BLD AUTO: 9.14 10*3/MM3 (ref 3.4–10.8)

## 2025-06-12 PROCEDURE — 82948 REAGENT STRIP/BLOOD GLUCOSE: CPT

## 2025-06-12 PROCEDURE — 25010000002 CEFAZOLIN PER 500 MG: Performed by: INTERNAL MEDICINE

## 2025-06-12 PROCEDURE — 63710000001 PREDNISONE PER 5 MG

## 2025-06-12 PROCEDURE — 25010000002 MAGNESIUM SULFATE 2 GM/50ML SOLUTION: Performed by: INTERNAL MEDICINE

## 2025-06-12 PROCEDURE — 99232 SBSQ HOSP IP/OBS MODERATE 35: CPT | Performed by: NURSE PRACTITIONER

## 2025-06-12 PROCEDURE — 63710000001 REVEFENACIN 175 MCG/3ML SOLUTION

## 2025-06-12 PROCEDURE — 25010000002 BUMETANIDE PER 0.5 MG: Performed by: NURSE PRACTITIONER

## 2025-06-12 PROCEDURE — 94761 N-INVAS EAR/PLS OXIMETRY MLT: CPT

## 2025-06-12 PROCEDURE — 80053 COMPREHEN METABOLIC PANEL: CPT | Performed by: INTERNAL MEDICINE

## 2025-06-12 PROCEDURE — 63710000001 INSULIN GLARGINE PER 5 UNITS

## 2025-06-12 PROCEDURE — 94664 DEMO&/EVAL PT USE INHALER: CPT

## 2025-06-12 PROCEDURE — 63710000001 INSULIN LISPRO (HUMAN) PER 5 UNITS

## 2025-06-12 PROCEDURE — 84132 ASSAY OF SERUM POTASSIUM: CPT | Performed by: INTERNAL MEDICINE

## 2025-06-12 PROCEDURE — 94799 UNLISTED PULMONARY SVC/PX: CPT

## 2025-06-12 PROCEDURE — 99232 SBSQ HOSP IP/OBS MODERATE 35: CPT

## 2025-06-12 PROCEDURE — 85025 COMPLETE CBC W/AUTO DIFF WBC: CPT | Performed by: INTERNAL MEDICINE

## 2025-06-12 PROCEDURE — 83735 ASSAY OF MAGNESIUM: CPT

## 2025-06-12 RX ORDER — POTASSIUM CHLORIDE 1500 MG/1
40 TABLET, EXTENDED RELEASE ORAL EVERY 4 HOURS
Status: DISCONTINUED | OUTPATIENT
Start: 2025-06-12 | End: 2025-06-12

## 2025-06-12 RX ORDER — MAGNESIUM SULFATE HEPTAHYDRATE 40 MG/ML
2 INJECTION, SOLUTION INTRAVENOUS
Status: COMPLETED | OUTPATIENT
Start: 2025-06-12 | End: 2025-06-12

## 2025-06-12 RX ORDER — POTASSIUM CHLORIDE 1500 MG/1
40 TABLET, EXTENDED RELEASE ORAL ONCE
Status: COMPLETED | OUTPATIENT
Start: 2025-06-12 | End: 2025-06-12

## 2025-06-12 RX ADMIN — CEFAZOLIN 1000 MG: 1 INJECTION, POWDER, FOR SOLUTION INTRAMUSCULAR; INTRAVENOUS at 20:58

## 2025-06-12 RX ADMIN — POTASSIUM CHLORIDE 40 MEQ: 1500 TABLET, EXTENDED RELEASE ORAL at 08:27

## 2025-06-12 RX ADMIN — EMPAGLIFLOZIN 10 MG: 10 TABLET, FILM COATED ORAL at 08:32

## 2025-06-12 RX ADMIN — ATORVASTATIN CALCIUM 10 MG: 10 TABLET, FILM COATED ORAL at 08:32

## 2025-06-12 RX ADMIN — BUMETANIDE 1 MG: 0.25 INJECTION INTRAMUSCULAR; INTRAVENOUS at 17:20

## 2025-06-12 RX ADMIN — PREDNISONE 10 MG: 10 TABLET ORAL at 08:32

## 2025-06-12 RX ADMIN — INSULIN GLARGINE 10 UNITS: 100 INJECTION, SOLUTION SUBCUTANEOUS at 20:58

## 2025-06-12 RX ADMIN — INSULIN LISPRO 3 UNITS: 100 INJECTION, SOLUTION INTRAVENOUS; SUBCUTANEOUS at 20:58

## 2025-06-12 RX ADMIN — BUDESONIDE AND FORMOTEROL FUMARATE DIHYDRATE 2 PUFF: 160; 4.5 AEROSOL RESPIRATORY (INHALATION) at 18:59

## 2025-06-12 RX ADMIN — TAMSULOSIN HYDROCHLORIDE 0.4 MG: 0.4 CAPSULE ORAL at 08:32

## 2025-06-12 RX ADMIN — ALBUTEROL SULFATE 2.5 MG: 2.5 SOLUTION RESPIRATORY (INHALATION) at 03:24

## 2025-06-12 RX ADMIN — MAGNESIUM SULFATE HEPTAHYDRATE 2 G: 40 INJECTION, SOLUTION INTRAVENOUS at 08:32

## 2025-06-12 RX ADMIN — SPIRONOLACTONE 25 MG: 25 TABLET ORAL at 08:32

## 2025-06-12 RX ADMIN — RIVAROXABAN 20 MG: 20 TABLET, FILM COATED ORAL at 17:20

## 2025-06-12 RX ADMIN — NEBIVOLOL 5 MG: 5 TABLET ORAL at 08:32

## 2025-06-12 RX ADMIN — CEFAZOLIN 1000 MG: 1 INJECTION, POWDER, FOR SOLUTION INTRAMUSCULAR; INTRAVENOUS at 14:31

## 2025-06-12 RX ADMIN — PANTOPRAZOLE SODIUM 40 MG: 40 TABLET, DELAYED RELEASE ORAL at 17:20

## 2025-06-12 RX ADMIN — BUDESONIDE AND FORMOTEROL FUMARATE DIHYDRATE 2 PUFF: 160; 4.5 AEROSOL RESPIRATORY (INHALATION) at 08:50

## 2025-06-12 RX ADMIN — MAGNESIUM SULFATE HEPTAHYDRATE 2 G: 40 INJECTION, SOLUTION INTRAVENOUS at 11:11

## 2025-06-12 RX ADMIN — ACETAMINOPHEN 650 MG: 325 TABLET ORAL at 03:24

## 2025-06-12 RX ADMIN — PANTOPRAZOLE SODIUM 40 MG: 40 TABLET, DELAYED RELEASE ORAL at 08:32

## 2025-06-12 RX ADMIN — REVEFENACIN 175 MCG: 175 SOLUTION RESPIRATORY (INHALATION) at 08:50

## 2025-06-12 RX ADMIN — MONTELUKAST 10 MG: 10 TABLET, FILM COATED ORAL at 20:58

## 2025-06-12 RX ADMIN — BUMETANIDE 1 MG: 0.25 INJECTION INTRAMUSCULAR; INTRAVENOUS at 05:07

## 2025-06-12 RX ADMIN — FINASTERIDE 5 MG: 5 TABLET, FILM COATED ORAL at 08:32

## 2025-06-12 RX ADMIN — MAGNESIUM SULFATE HEPTAHYDRATE 2 G: 40 INJECTION, SOLUTION INTRAVENOUS at 12:59

## 2025-06-12 RX ADMIN — CEFAZOLIN 1000 MG: 1 INJECTION, POWDER, FOR SOLUTION INTRAMUSCULAR; INTRAVENOUS at 05:05

## 2025-06-12 NOTE — PROGRESS NOTES
"[facility] Progress Note     LOS: 3 days   Patient Care Team:  Cheli Damico MD as PCP - General (Family Medicine)  Provider, No Known as PCP - Family Medicine  PCP:  Cheli Damico MD    Chief Complaint: HFpEF, CHF, chronic A-fib    Subjective: Patient resting comfortably in recliner stating he feels significant improvements in BLE edema.  Patient had questions regarding stress test tomorrow specifically indication for why and risks associated with.  Patient understands that he is to follow-up with his PCP and cardiologist upon discharge and understands he needs to watch his daily weights and stay on top of his fluid intake      OBJECTIVE  REVIEW OF SYSTEMS:  @Cardiovascular ROS: positive for - dyspnea on exertion, edema, and irregular heartbeat@        Vital Sign Min/Max for last 24 hours  Temp  Min: 97.6 °F (36.4 °C)  Max: 98.4 °F (36.9 °C)   BP  Min: 105/74  Max: 127/83   Pulse  Min: 59  Max: 79   Resp  Min: 18  Max: 18   SpO2  Min: 90 %  Max: 100 %   No data recorded   Weight  Min: 97 kg (213 lb 12.8 oz)  Max: 97 kg (213 lb 12.8 oz)     Telemetry: Afib with HR in 50-60's      I&O/ Weights:     Intake/Output Summary (Last 24 hours) at 6/12/2025 0850  Last data filed at 6/12/2025 0700  Gross per 24 hour   Intake 240 ml   Output 4475 ml   Net -4235 ml         06/11/25  0604 06/12/25  0508   Weight: 104 kg (228 lb 9.6 oz) 97 kg (213 lb 12.8 oz)     Flowsheet Rows      Flowsheet Row First Filed Value   Admission Height 180.3 cm (71\") Documented at 06/09/2025 0950   Admission Weight 109 kg (240 lb) Documented at 06/09/2025 0950               Physical Exam:  Vitals reviewed.   Constitutional:       Appearance: Overweight and not in distress.      Interventions: Nasal cannula in place.   Neck:      Vascular: No JVR. JVD elevated.   Pulmonary:      Effort: Pulmonary effort is normal.      Breath sounds: Bibasilar and bilateral Rhonchi present. "   Cardiovascular:      Bradycardia present. Irregularly irregular rhythm.      Murmurs: There is no murmur.   Pulses:     No decreased pulses.   Edema:     Peripheral edema present.     Thigh: bilateral 1+ edema of the thigh.     Pretibial: bilateral 1+ edema of the pretibial area.     Ankle: bilateral 2+ edema of the ankle.     Feet: bilateral 2+ pitting edema of the feet.  Abdominal:      General: There is distension.   Musculoskeletal:         General: Tenderness present. Skin:     General: Skin is warm and dry.   Neurological:      General: No focal deficit present.      Mental Status: Alert.   Psychiatric:         Behavior: Behavior is cooperative.            Labs:   Results from last 7 days   Lab Units 06/12/25  0527 06/11/25  0536 06/10/25  0545   WBC 10*3/mm3 9.14 8.45 5.38   HEMOGLOBIN g/dL 9.3* 9.5* 8.5*   HEMATOCRIT % 33.0* 33.4* 29.9*   PLATELETS 10*3/mm3 238 217 207     Lab Results   Lab Value Date/Time    TROPONINT 142 (C) 06/09/2025 1129    TROPONINT 144 (C) 06/09/2025 0948    TROPONINT 82 (C) 10/05/2024 1343    TROPONINT 86 (C) 10/05/2024 1129    TROPONINT 113 (C) 09/01/2024 2251    TROPONINT 109 (C) 09/01/2024 2038     Results from last 7 days   Lab Units 06/09/25  0948   INR  2.07*     Results from last 7 days   Lab Units 06/12/25  0527 06/11/25  1810 06/11/25  0536 06/10/25  0545 06/09/25  0948   SODIUM mmol/L 143 142 142   < > 143   POTASSIUM mmol/L 3.4* 4.2 3.5   < > 3.8   CHLORIDE mmol/L 94* 93* 97*   < > 103   CO2 mmol/L 42.0* 42.0* 35.0*   < > 29.0   BUN mg/dL 18.6 16.1 15.4   < > 16.6   CREATININE mg/dL 0.98 1.07 1.00   < > 0.92   CALCIUM mg/dL 8.5* 9.2 8.8   < > 8.9   BILIRUBIN mg/dL 0.3  --  0.4  --  0.5   ALK PHOS U/L 43  --  45  --  49   ALT (SGPT) U/L 9  --  13  --  20   AST (SGOT) U/L 20  --  20  --  25   GLUCOSE mg/dL 65 186* 68   < > 87    < > = values in this interval not displayed.     Results from last 7 days   Lab Units 06/09/25  0948   HEMOGLOBIN A1C % 5.30     Results from last  7 days   Lab Units 06/09/25  1129   CHOLESTEROL mg/dL 97   TRIGLYCERIDES mg/dL 52   HDL CHOL mg/dL 54   LDL CHOL mg/dL 30     Results from last 7 days   Lab Units 06/09/25  1129   TSH uIU/mL 1.590               Medication Review:   atorvastatin, 10 mg, Oral, Daily  budesonide-formoterol, 2 puff, Inhalation, BID - RT  bumetanide, 1 mg, Intravenous, Q12H  ceFAZolin, 1,000 mg, Intravenous, Q8H  empagliflozin, 10 mg, Oral, Daily  finasteride, 5 mg, Oral, Daily  insulin glargine, 10 Units, Subcutaneous, Nightly  insulin lispro, 2-7 Units, Subcutaneous, 4x Daily AC & at Bedtime  magnesium sulfate, 2 g, Intravenous, Q2H  montelukast, 10 mg, Oral, Nightly  nebivolol, 5 mg, Oral, Q24H  pantoprazole, 40 mg, Oral, BID AC  pharmacy consult - MTM, , Not Applicable, Daily  predniSONE, 10 mg, Oral, Daily  revefenacin, 175 mcg, Nebulization, Daily - RT  rivaroxaban, 20 mg, Oral, Daily With Dinner  spironolactone, 25 mg, Oral, Daily  tamsulosin, 0.4 mg, Oral, Daily             IMAGING/DIAGNOSTICS     Results for orders placed during the hospital encounter of 06/09/25    Adult Transthoracic Echo Complete W/ Cont if Necessary Per Protocol    Interpretation Summary    Left ventricular systolic function is normal. Calculated left ventricular EF = 67.2% Normal left ventricular cavity size noted. Left ventricular wall thickness is consistent with mild concentric hypertrophy. Elevated left atrial pressure.    The right ventricular cavity is mild to moderately dilated. Normal right ventricular systolic function noted.    Left atrial volume is severely increased.    There is moderate calcification of the aortic valve. No aortic valve regurgitation is present. Mild aortic valve stenosis is present. Aortic valve area is 1.49 cm2. Peak velocity of the flow distal to the aortic valve is 259.2 cm/s. Aortic valve mean pressure gradient is 14.3 mmHg.    Mitral annular calcification is present. Mild mitral valve regurgitation is present. No  significant mitral valve stenosis is present.    Mild to moderate tricuspid valve regurgitation is present. Estimated right ventricular systolic pressure from tricuspid regurgitation is moderately elevated (45-55 mmHg).      Problem List:     CHF (congestive heart failure)    Hyperkalemia    COPD (chronic obstructive pulmonary disease)    Afib    Chronic anticoagulation    Diabetes mellitus, type II    Sleep apnea    Hypertension    Acute blood loss anemia         Assessment:  HFpEF/CHF  EF 67%  Bumex 1 mg twice daily for ongoing BLE edema, BLE duplex negative  Net diuresis ~14.8 L so far  Start spironolactone and Jardiance  I's/O, daily standing weights, 1800 mL liter/day fluid restriction  Afib  [id number] , Xarelto 20 mg twice daily  Rate controlled on Nebivolol 5 mg  Maintain potassium 4-4.4 and magnesium 2-2.2, replace for goal  HLD  Lipid profile well-controlled  Atorvastatin 10  HTN  Normotensive  COPD  NEW  Important to wear NIPPV at bedtime and during daytime naps  DM II  A1c 5.3    Plan:   Stress PET tomorrow to complete cardiac evaluation, hold BB and NPO after midnight.   Resume BB after test.   Renal function stable, continue diuresis  Resume cardiology care with [facility] Cardiologist.       SHALOM Casillas

## 2025-06-12 NOTE — PROGRESS NOTES
[facility]  PROGRESS NOTE    Patient Name: Jonatan Mcguire  : 1950  MRN: [id number]    Date of Admission: 2025  Primary Care Physician: Cheli Damico MD    Subjective   Subjective     CC:  Follow up CHF     HPI:  Patient was seen resting in bed in no apparent distress. No acute events overnight per nursing. Patient is concerned that he will not be able to tolerate stress test. He is planning to talk with Dr. Chavez about his concerns today. Bilateral leg wraps in place. No new complaints at this time.     Objective   Objective     Vital Signs:   Temp:  [97.7 °F (36.5 °C)-98.5 °F (36.9 °C)] 98.1 °F (36.7 °C)  Heart Rate:  [59-92] 75  Resp:  [16-18] 16  BP: (102-127)/(60-83) 111/61  Flow (L/min) (Oxygen Therapy):  [1-2] 2     Physical Exam:  Constitutional: No acute distress, awake, alert, chronically ill appearing, obese  HENT: NCAT, mucous membranes moist  Respiratory: course, diminished in bases, respiratory effort normal on 2L NC   Cardiovascular: RRR, no murmurs, palpable pedal pulses bilaterally, cap refill brisk   Gastrointestinal: Positive bowel sounds, soft, nontender, nondistended  Musculoskeletal: BLE edema with compression wraps in place   Psychiatric: flat affect, cooperative  Neurologic: Oriented x 3, moves all extremities, speech clear  Skin: warm, dry, BLE wraps in place       Results Reviewed:  LAB RESULTS:      Lab 25  0527 25  0536 06/10/25  0545 25  1129 25  0948   WBC 9.14 8.45 5.38  --  7.63   HEMOGLOBIN 9.3* 9.5* 8.5*  --  9.5*   HEMATOCRIT 33.0* 33.4* 29.9*  --  33.5*   PLATELETS 238 217 207  --  218   NEUTROS ABS 3.53 3.88  --   --  5.98   IMMATURE GRANS (ABS) 0.03 0.02  --   --  0.03   LYMPHS ABS 4.53* 3.49*  --   --  1.04   MONOS ABS 0.98* 1.00*  --   --  0.53   EOS ABS 0.04 0.04  --   --  0.03   MCV 92.2 92.3 92.9  --  93.8   CRP  --   --   --  1.06*  --    PROCALCITONIN  --   --   --  0.07  --     LACTATE  --   --   --   --  1.4   PROTIME  --   --   --   --  24.7*   D DIMER QUANT  --   --   --   --  1.35*   HSTROP T  --   --   --  142* 144*         Lab 06/12/25  0527 06/11/25  1810 06/11/25  0536 06/10/25  1305 06/10/25  0545 06/09/25  1129 06/09/25  0948   SODIUM 143 142 142  --  143  --  143   POTASSIUM 3.4* 4.2 3.5 4.4 3.8  --  3.8   CHLORIDE 94* 93* 97*  --  101  --  103   CO2 42.0* 42.0* 35.0*  --  34.0*  --  29.0   ANION GAP 7.0 7.0 10.0  --  8.0  --  11.0   BUN 18.6 16.1 15.4  --  14.8  --  16.6   CREATININE 0.98 1.07 1.00  --  0.87  --  0.92   EGFR 79.9 71.9 78.0  --  89.4  --  86.2   GLUCOSE 65 186* 68  --  61*  --  87   CALCIUM 8.5* 9.2 8.8  --  8.4*  --  8.9   MAGNESIUM 1.4*  --  1.8  --  1.3*  --   --    HEMOGLOBIN A1C  --   --   --   --   --   --  5.30   TSH  --   --   --   --   --  1.590  --          Lab 06/12/25  0527 06/11/25  0536 06/09/25  0948   TOTAL PROTEIN 5.6* 5.8* 6.3   ALBUMIN 3.1* 3.3* 3.5   GLOBULIN 2.5 2.5 2.8   ALT (SGPT) 9 13 20   AST (SGOT) 20 20 25   BILIRUBIN 0.3 0.4 0.5   ALK PHOS 43 45 49         Lab 06/09/25  1129 06/09/25  0948   PROBNP  --  1,415.0   HSTROP T 142* 144*   PROTIME  --  24.7*   INR  --  2.07*         Lab 06/09/25  1129   CHOLESTEROL 97   LDL CHOL 30   HDL CHOL 54   TRIGLYCERIDES 52         Lab 06/09/25  1129   IRON 15*   IRON SATURATION (TSAT) 4*   TIBC 392   TRANSFERRIN 263   FERRITIN 37.30         Lab 06/09/25  1512   FIO2 28   CARBOXYHEMOGLOBIN (VENOUS) 1.6     Brief Urine Lab Results  (Last result in the past 365 days)        Color   Clarity   Blood   Leuk Est   Nitrite   Protein   CREAT   Urine HCG        06/09/25 1044 Yellow   Clear   Negative   Small (1+)   Negative   Negative                   Microbiology Results Abnormal       Procedure Component Value - Date/Time    Urine Culture - Urine, Urine, Clean Catch [[id number]]  (Abnormal)  (Susceptibility) Collected: 06/09/25 1533    Lab Status: Final result Specimen: Urine, Clean Catch Updated:  06/11/25 0825     Urine Culture 25,000 CFU/mL Klebsiella pneumoniae ssp pneumoniae    Narrative:      Colonization of the urinary tract without infection is common. Treatment is discouraged unless the patient is symptomatic, pregnant, or undergoing an invasive urologic procedure.    Susceptibility        Klebsiella pneumoniae ssp pneumoniae      PAUL      Amoxicillin + Clavulanate Susceptible      Ampicillin Resistant      Ampicillin + Sulbactam Susceptible      Cefazolin (Urine) Susceptible      Cefepime Susceptible      Ceftazidime Susceptible      Ceftriaxone Susceptible      Cefuroxime axetil Susceptible      Gentamicin Susceptible      Levofloxacin Susceptible      Nitrofurantoin Intermediate      Piperacillin + Tazobactam Susceptible      Trimethoprim + Sulfamethoxazole Susceptible                                   Duplex Venous Lower Extremity - Bilateral CAR  Result Date: 6/10/2025    Normal bilateral lower extremity venous duplex scan.   Right sided additional findings: Unable to visualize the peroneal veins       Results for orders placed during the hospital encounter of 06/09/25    Adult Transthoracic Echo Complete W/ Cont if Necessary Per Protocol    Interpretation Summary    Left ventricular systolic function is normal. Calculated left ventricular EF = 67.2% Normal left ventricular cavity size noted. Left ventricular wall thickness is consistent with mild concentric hypertrophy. Elevated left atrial pressure.    The right ventricular cavity is mild to moderately dilated. Normal right ventricular systolic function noted.    Left atrial volume is severely increased.    There is moderate calcification of the aortic valve. No aortic valve regurgitation is present. Mild aortic valve stenosis is present. Aortic valve area is 1.49 cm2. Peak velocity of the flow distal to the aortic valve is 259.2 cm/s. Aortic valve mean pressure gradient is 14.3 mmHg.    Mitral annular calcification is present. Mild mitral  valve regurgitation is present. No significant mitral valve stenosis is present.    Mild to moderate tricuspid valve regurgitation is present. Estimated right ventricular systolic pressure from tricuspid regurgitation is moderately elevated (45-55 mmHg).      Current medications:  Scheduled Meds:atorvastatin, 10 mg, Oral, Daily  budesonide-formoterol, 2 puff, Inhalation, BID - RT  bumetanide, 1 mg, Intravenous, Q12H  ceFAZolin, 1,000 mg, Intravenous, Q8H  empagliflozin, 10 mg, Oral, Daily  finasteride, 5 mg, Oral, Daily  insulin glargine, 10 Units, Subcutaneous, Nightly  insulin lispro, 2-7 Units, Subcutaneous, 4x Daily AC & at Bedtime  magnesium sulfate, 2 g, Intravenous, Q2H  montelukast, 10 mg, Oral, Nightly  nebivolol, 5 mg, Oral, Q24H  pantoprazole, 40 mg, Oral, BID AC  pharmacy consult - MTM, , Not Applicable, Daily  predniSONE, 10 mg, Oral, Daily  revefenacin, 175 mcg, Nebulization, Daily - RT  rivaroxaban, 20 mg, Oral, Daily With Dinner  spironolactone, 25 mg, Oral, Daily  tamsulosin, 0.4 mg, Oral, Daily      Continuous Infusions:   PRN Meds:.  acetaminophen **OR** acetaminophen **OR** acetaminophen    albuterol    senna-docusate sodium **AND** polyethylene glycol **AND** bisacodyl **AND** bisacodyl    Calcium Replacement - Follow Nurse / BPA Driven Protocol    dextrose    dextrose    glucagon (human recombinant)    Magnesium Standard Dose Replacement - Follow Nurse / BPA Driven Protocol    nitroglycerin    Phosphorus Replacement - Follow Nurse / BPA Driven Protocol    Potassium Replacement - Follow Nurse / BPA Driven Protocol    sodium chloride    sodium chloride    Assessment & Plan   Assessment & Plan     Active Hospital Problems    Diagnosis  POA    **CHF (congestive heart failure) [I50.9]  Yes    Chronic anticoagulation [Z79.01]  Not Applicable    Acute blood loss anemia [D62]  Yes    Sleep apnea [G47.30]  Yes    Hypertension [I10]  Yes    Diabetes mellitus, type II [E11.9]  Yes    COPD (chronic  obstructive pulmonary disease) [J44.9]  Yes    Afib [I48.91]  Yes    Hyperkalemia [E87.5]  Yes      Resolved Hospital Problems   No resolved problems to display.        Brief Hospital Course to date:  Jonatan Mcguire is a 75 y.o. male  with a PMH significant for atrial fibrillation on Xarelto for anticoagulation, COPD, T2DM, HTN, sleep apnea, obesity, chronic venous stasis bilaterally, OA and BPH.  Presented [facility] ED due to worsening shortness of breath, swelling in lower extremities bilaterally and decline in ADLs. Cardiology was consulted and is assisting with GDMT optimization.     This patient's problems and plans were partially entered by my partner and updated as appropriate by me 06/12/25.    Acute CHF exacerbation  Decline in ADLs  Exertional dyspnea  Lower extremity edema  HTN  --cardiology following   -duplex negative and ECHO with EF 67%  --s/p dilt  -continue nebivolol   --bumex BID   --started on spironolactone and jardiance, monitor renal fxn and electrolytes closely   --strict I/O , daily weights   --monitor and replace electrolytes  --Tentative plans for PET MPS in next 1-2 days per cardiology   --AM labs      UTI  --4+ bacteria, 21-50WBC  --cx with 25k Kleb  --monitoring off abx     Bilateral venous stasis dermatitis  Bullous dermatitis  --duplex negative  --PT wound following     Persistent atrial fibrillation  On chronic anticoagulation  --continue nebivolol, eliquis     Hypokalemia  Hypomagnesemia  -replace per protocol      COPD, not in exacerbation  --continue home inhalers     HLD  --continue Lipitor      T2DM  --continue basal/bolus, monitor  --A1C 5.3      Anemia  -Hgb 9.3 this AM   -stable    Sleep apnea     Expected Discharge Location and Transportation: home  Expected Discharge   Expected Discharge Date: 6/13/2025; Expected Discharge Time:       VTE Prophylaxis:  Pharmacologic & mechanical VTE prophylaxis orders are present.         AM-PAC 6 Clicks Score (PT): 18 (06/11/25  2035)    CODE STATUS:   Code Status and Medical Interventions: CPR (Attempt to Resuscitate); Full Support   Ordered at: 06/09/25 1433     Code Status (Patient has no pulse and is not breathing):    CPR (Attempt to Resuscitate)     Medical Interventions (Patient has pulse or is breathing):    Full Support     Level Of Support Discussed With:    Patient       Roberto Comer, SHALOM  06/12/25

## 2025-06-13 ENCOUNTER — APPOINTMENT (OUTPATIENT)
Dept: CARDIOLOGY | Facility: HOSPITAL | Age: 75
DRG: 291 | End: 2025-06-13
Payer: MEDICARE

## 2025-06-13 ENCOUNTER — READMISSION MANAGEMENT (OUTPATIENT)
Dept: CALL CENTER | Facility: HOSPITAL | Age: 75
End: 2025-06-13
Payer: MEDICARE

## 2025-06-13 VITALS
WEIGHT: 216.2 LBS | OXYGEN SATURATION: 99 % | RESPIRATION RATE: 18 BRPM | TEMPERATURE: 98.3 F | DIASTOLIC BLOOD PRESSURE: 89 MMHG | HEART RATE: 62 BPM | SYSTOLIC BLOOD PRESSURE: 112 MMHG | HEIGHT: 71 IN | BODY MASS INDEX: 30.27 KG/M2

## 2025-06-13 PROBLEM — I50.33 ACUTE ON CHRONIC HEART FAILURE WITH PRESERVED EJECTION FRACTION (HFPEF): Status: ACTIVE | Noted: 2025-06-13

## 2025-06-13 LAB
ANION GAP SERPL CALCULATED.3IONS-SCNC: 7 MMOL/L (ref 5–15)
BH CV REST NUCLEAR ISOTOPE DOSE: 29.9 MCI
BH CV STRESS BP STAGE 2: NORMAL
BH CV STRESS BP STAGE 4: NORMAL
BH CV STRESS COMMENTS STAGE 1: NORMAL
BH CV STRESS DOSE REGADENOSON STAGE 1: 0.4
BH CV STRESS DURATION MIN STAGE 1: 1
BH CV STRESS DURATION MIN STAGE 2: 1
BH CV STRESS DURATION MIN STAGE 3: 1
BH CV STRESS DURATION MIN STAGE 4: 1
BH CV STRESS DURATION SEC STAGE 1: 10
BH CV STRESS DURATION SEC STAGE 2: 0
BH CV STRESS HR STAGE 1: 78
BH CV STRESS HR STAGE 2: 71
BH CV STRESS HR STAGE 3: 71
BH CV STRESS HR STAGE 4: 72
BH CV STRESS NUCLEAR ISOTOPE DOSE: 29.9 MCI
BH CV STRESS O2 STAGE 1: 98
BH CV STRESS O2 STAGE 2: 98
BH CV STRESS O2 STAGE 3: 99
BH CV STRESS O2 STAGE 4: 99
BH CV STRESS PROTOCOL 1: NORMAL
BH CV STRESS RECOVERY BP: NORMAL MMHG
BH CV STRESS RECOVERY HR: 80 BPM
BH CV STRESS RECOVERY O2: 99 %
BH CV STRESS STAGE 1: 1
BH CV STRESS STAGE 2: 2
BH CV STRESS STAGE 3: 3
BH CV STRESS STAGE 4: 4
BUN SERPL-MCNC: 18.7 MG/DL (ref 8–23)
BUN/CREAT SERPL: 19.1 (ref 7–25)
CALCIUM SPEC-SCNC: 8.7 MG/DL (ref 8.6–10.5)
CHLORIDE SERPL-SCNC: 94 MMOL/L (ref 98–107)
CO2 SERPL-SCNC: 43 MMOL/L (ref 22–29)
CREAT SERPL-MCNC: 0.98 MG/DL (ref 0.76–1.27)
DEPRECATED RDW RBC AUTO: 55.8 FL (ref 37–54)
EGFRCR SERPLBLD CKD-EPI 2021: 79.9 ML/MIN/1.73
ERYTHROCYTE [DISTWIDTH] IN BLOOD BY AUTOMATED COUNT: 16.5 % (ref 12.3–15.4)
GLUCOSE BLDC GLUCOMTR-MCNC: 182 MG/DL (ref 70–130)
GLUCOSE BLDC GLUCOMTR-MCNC: 72 MG/DL (ref 70–130)
GLUCOSE BLDC GLUCOMTR-MCNC: 80 MG/DL (ref 70–130)
GLUCOSE SERPL-MCNC: 58 MG/DL (ref 65–99)
HCT VFR BLD AUTO: 35.9 % (ref 37.5–51)
HGB BLD-MCNC: 10 G/DL (ref 13–17.7)
MAGNESIUM SERPL-MCNC: 2.1 MG/DL (ref 1.6–2.4)
MAXIMAL PREDICTED HEART RATE: 144 BPM
MCH RBC QN AUTO: 25.8 PG (ref 26.6–33)
MCHC RBC AUTO-ENTMCNC: 27.9 G/DL (ref 31.5–35.7)
MCV RBC AUTO: 92.5 FL (ref 79–97)
PERCENT MAX PREDICTED HR: 59.72 %
PLATELET # BLD AUTO: 245 10*3/MM3 (ref 140–450)
PMV BLD AUTO: 9.7 FL (ref 6–12)
POTASSIUM SERPL-SCNC: 4.2 MMOL/L (ref 3.5–5.2)
RBC # BLD AUTO: 3.88 10*6/MM3 (ref 4.14–5.8)
SODIUM SERPL-SCNC: 144 MMOL/L (ref 136–145)
STRESS BASELINE BP: NORMAL MMHG
STRESS BASELINE HR: 61 BPM
STRESS O2 SAT REST: 96 %
STRESS PERCENT HR: 70 %
STRESS POST ESTIMATED WORKLOAD: 1 METS
STRESS POST EXERCISE DUR MIN: 4 MIN
STRESS POST EXERCISE DUR SEC: 0 SEC
STRESS POST O2 SAT PEAK: 98 %
STRESS POST PEAK BP: NORMAL MMHG
STRESS POST PEAK HR: 86 BPM
STRESS TARGET HR: 122 BPM
WBC NRBC COR # BLD AUTO: 10.59 10*3/MM3 (ref 3.4–10.8)

## 2025-06-13 PROCEDURE — 25010000002 REGADENOSON 0.4 MG/5ML SOLUTION

## 2025-06-13 PROCEDURE — 83735 ASSAY OF MAGNESIUM: CPT

## 2025-06-13 PROCEDURE — 93017 CV STRESS TEST TRACING ONLY: CPT

## 2025-06-13 PROCEDURE — 99232 SBSQ HOSP IP/OBS MODERATE 35: CPT

## 2025-06-13 PROCEDURE — 99239 HOSP IP/OBS DSCHRG MGMT >30: CPT | Performed by: NURSE PRACTITIONER

## 2025-06-13 PROCEDURE — 25010000002 BUMETANIDE PER 0.5 MG: Performed by: NURSE PRACTITIONER

## 2025-06-13 PROCEDURE — A9555 RB82 RUBIDIUM: HCPCS

## 2025-06-13 PROCEDURE — 78431 MYOCRD IMG PET RST&STRS CT: CPT

## 2025-06-13 PROCEDURE — 63710000001 PREDNISONE PER 5 MG

## 2025-06-13 PROCEDURE — 80048 BASIC METABOLIC PNL TOTAL CA: CPT

## 2025-06-13 PROCEDURE — 93016 CV STRESS TEST SUPVJ ONLY: CPT | Performed by: INTERNAL MEDICINE

## 2025-06-13 PROCEDURE — 78431 MYOCRD IMG PET RST&STRS CT: CPT | Performed by: INTERNAL MEDICINE

## 2025-06-13 PROCEDURE — 82948 REAGENT STRIP/BLOOD GLUCOSE: CPT

## 2025-06-13 PROCEDURE — 34310000005 RUBIDIUM CHLORIDE

## 2025-06-13 PROCEDURE — 85027 COMPLETE CBC AUTOMATED: CPT

## 2025-06-13 PROCEDURE — 25010000002 CEFAZOLIN PER 500 MG: Performed by: INTERNAL MEDICINE

## 2025-06-13 PROCEDURE — 93018 CV STRESS TEST I&R ONLY: CPT | Performed by: INTERNAL MEDICINE

## 2025-06-13 RX ORDER — SPIRONOLACTONE 25 MG/1
25 TABLET ORAL DAILY
Qty: 30 TABLET | Refills: 0 | Status: SHIPPED | OUTPATIENT
Start: 2025-06-14

## 2025-06-13 RX ORDER — NEBIVOLOL 5 MG/1
5 TABLET ORAL
Status: DISCONTINUED | OUTPATIENT
Start: 2025-06-13 | End: 2025-06-13 | Stop reason: HOSPADM

## 2025-06-13 RX ORDER — CAFFEINE CITRATE 20 MG/ML
60 SOLUTION INTRAVENOUS ONCE
Status: COMPLETED | OUTPATIENT
Start: 2025-06-13 | End: 2025-06-13

## 2025-06-13 RX ORDER — NEBIVOLOL 5 MG/1
5 TABLET ORAL
Qty: 30 TABLET | Refills: 0 | Status: SHIPPED | OUTPATIENT
Start: 2025-06-14

## 2025-06-13 RX ORDER — POTASSIUM CHLORIDE 750 MG/1
10 TABLET, EXTENDED RELEASE ORAL DAILY
Qty: 30 TABLET | Refills: 11 | Status: CANCELLED | OUTPATIENT
Start: 2025-06-13

## 2025-06-13 RX ORDER — BUMETANIDE 0.5 MG/1
1 TABLET ORAL DAILY
Qty: 60 TABLET | Refills: 0 | Status: SHIPPED | OUTPATIENT
Start: 2025-06-13 | End: 2025-06-13

## 2025-06-13 RX ORDER — NEBIVOLOL 5 MG/1
5 TABLET ORAL
Qty: 30 TABLET | Refills: 0 | Status: SHIPPED | OUTPATIENT
Start: 2025-06-14 | End: 2025-06-13

## 2025-06-13 RX ORDER — SPIRONOLACTONE 25 MG/1
25 TABLET ORAL DAILY
Qty: 30 TABLET | Refills: 0 | Status: SHIPPED | OUTPATIENT
Start: 2025-06-14 | End: 2025-06-13

## 2025-06-13 RX ORDER — REGADENOSON 0.08 MG/ML
0.4 INJECTION, SOLUTION INTRAVENOUS ONCE
Status: COMPLETED | OUTPATIENT
Start: 2025-06-13 | End: 2025-06-13

## 2025-06-13 RX ORDER — BUMETANIDE 0.5 MG/1
1 TABLET ORAL DAILY
Qty: 60 TABLET | Refills: 2 | Status: CANCELLED | OUTPATIENT
Start: 2025-06-13

## 2025-06-13 RX ORDER — POTASSIUM CHLORIDE 750 MG/1
10 CAPSULE, EXTENDED RELEASE ORAL DAILY
Qty: 60 CAPSULE | Refills: 0 | Status: SHIPPED | OUTPATIENT
Start: 2025-06-13

## 2025-06-13 RX ORDER — BUMETANIDE 0.5 MG/1
1 TABLET ORAL DAILY
Qty: 60 TABLET | Refills: 0 | Status: SHIPPED | OUTPATIENT
Start: 2025-06-13

## 2025-06-13 RX ORDER — ALBUTEROL SULFATE 90 UG/1
2 INHALANT RESPIRATORY (INHALATION) ONCE
Status: COMPLETED | OUTPATIENT
Start: 2025-06-13 | End: 2025-06-13

## 2025-06-13 RX ORDER — NEBIVOLOL 5 MG/1
5 TABLET ORAL
Qty: 90 TABLET | Refills: 3 | Status: CANCELLED | OUTPATIENT
Start: 2025-06-13

## 2025-06-13 RX ADMIN — REGADENOSON 0.4 MG: 0.08 INJECTION, SOLUTION INTRAVENOUS at 09:39

## 2025-06-13 RX ADMIN — ALBUTEROL SULFATE 2 PUFF: 90 AEROSOL, METERED RESPIRATORY (INHALATION) at 09:25

## 2025-06-13 RX ADMIN — PANTOPRAZOLE SODIUM 40 MG: 40 TABLET, DELAYED RELEASE ORAL at 10:10

## 2025-06-13 RX ADMIN — TAMSULOSIN HYDROCHLORIDE 0.4 MG: 0.4 CAPSULE ORAL at 10:10

## 2025-06-13 RX ADMIN — ATORVASTATIN CALCIUM 10 MG: 10 TABLET, FILM COATED ORAL at 10:10

## 2025-06-13 RX ADMIN — CAFFEINE CITRATE 60 MG: 20 INJECTION, SOLUTION INTRAVENOUS at 09:46

## 2025-06-13 RX ADMIN — NEBIVOLOL 5 MG: 5 TABLET ORAL at 12:37

## 2025-06-13 RX ADMIN — PREDNISONE 10 MG: 10 TABLET ORAL at 12:38

## 2025-06-13 RX ADMIN — SPIRONOLACTONE 25 MG: 25 TABLET ORAL at 10:10

## 2025-06-13 RX ADMIN — RUBIDIUM CHLORIDE RB-82 1 DOSE: 150 INJECTION, SOLUTION INTRAVENOUS at 09:28

## 2025-06-13 RX ADMIN — FINASTERIDE 5 MG: 5 TABLET, FILM COATED ORAL at 10:10

## 2025-06-13 RX ADMIN — RUBIDIUM CHLORIDE RB-82 1 DOSE: 150 INJECTION, SOLUTION INTRAVENOUS at 09:40

## 2025-06-13 RX ADMIN — EMPAGLIFLOZIN 10 MG: 10 TABLET, FILM COATED ORAL at 10:10

## 2025-06-13 RX ADMIN — BUMETANIDE 1 MG: 0.25 INJECTION INTRAMUSCULAR; INTRAVENOUS at 10:10

## 2025-06-13 RX ADMIN — DEXTROSE MONOHYDRATE 25 G: 25 INJECTION, SOLUTION INTRAVENOUS at 08:47

## 2025-06-13 RX ADMIN — CEFAZOLIN 1000 MG: 1 INJECTION, POWDER, FOR SOLUTION INTRAMUSCULAR; INTRAVENOUS at 05:15

## 2025-06-13 NOTE — CASE MANAGEMENT/SOCIAL WORK
Continued Stay Note  [facility]     Patient Name: Jonatan Mcguire  MRN: [id number]  Today's Date: 6/13/2025    Admit Date: 6/9/2025    Plan: Home with [facility]   Discharge Plan       Row Name 06/13/25 0836       Plan    Plan Home with [facility]    Patient/Family in Agreement with Plan yes    Plan Comments Spoke with patient at bedside. Plan is home with [facility]. Daughter will transport. CM will continue to follow.    Final Discharge Disposition Code 06 - home with home health care                   Discharge Codes    No documentation.                 Expected Discharge Date and Time       Expected Discharge Date Expected Discharge Time    Jun 12, 2025               Shelton Dodd RN

## 2025-06-13 NOTE — CASE MANAGEMENT/SOCIAL WORK
Case Management Discharge Note      Final Note: Plan is home with [facility] SN/PT/OT. Family will transport. [place] at [facility] notified of discharge. No other CM discharge needs identified.         Selected Continued Care - Admitted Since 6/9/2025       Destination    No services have been selected for the patient.                Durable Medical Equipment    No services have been selected for the patient.                Dialysis/Infusion    No services have been selected for the patient.                Home Medical Care Coordination complete.      Service Provider Services Address Phone Fax Patient Preferred    [facility], Home Nursing, Home Rehabilitation [address], [place] [phone] --              Therapy    No services have been selected for the patient.                Community Resources    No services have been selected for the patient.                Community & DME    No services have been selected for the patient.                         Final Discharge Disposition Code: 06 - home with home health care

## 2025-06-13 NOTE — PROGRESS NOTES
"[facility] Progress Note     LOS: 4 days   Patient Care Team:  Cheli Damico MD as PCP - General (Family Medicine)  Provider, No Known as PCP - Family Medicine  PCP:  Cheli Damico MD    Chief Complaint: HFpEF, A-fib    Subjective: Patient had his stress PET this morning and is now ready for discharge home.  We had a discussion regarding daily weights and the importance of knowing her dry weight.  Patient is aware to follow-up with his primary cardiologist on June 26 to further discuss the results of his stress PET.  Patient is also aware to follow-up with his PCP within a week.  Patient endorses feeling continual improvements in BLE edema with no complaints of CP, significant SOB, palpitations, lightheadedness, presyncope, syncope.      OBJECTIVE  REVIEW OF SYSTEMS:  @Cardiovascular ROS: positive for - dyspnea on exertion, edema, and irregular heartbeat@        Vital Sign Min/Max for last 24 hours  Temp  Min: 97.9 °F (36.6 °C)  Max: 98.5 °F (36.9 °C)   BP  Min: 101/74  Max: 127/70   Pulse  Min: 53  Max: 84   Resp  Min: 16  Max: 18   SpO2  Min: 94 %  Max: 100 %   No data recorded   Weight  Min: 98.1 kg (216 lb 3.2 oz)  Max: 98.1 kg (216 lb 3.2 oz)     Telemetry: Rate controlled A-fib      I&O/ Weights:     Intake/Output Summary (Last 24 hours) at 6/13/2025 0801  Last data filed at 6/13/2025 0000  Gross per 24 hour   Intake 480 ml   Output 2850 ml   Net -2370 ml         06/12/25  0508 06/13/25  0400   Weight: 97 kg (213 lb 12.8 oz) 98.1 kg (216 lb 3.2 oz)     Flowsheet Rows      Flowsheet Row First Filed Value   Admission Height 180.3 cm (71\") Documented at 06/09/2025 0950   Admission Weight 109 kg (240 lb) Documented at 06/09/2025 0950               Physical Exam:  Vitals reviewed.   Constitutional:       Appearance: Overweight and not in distress.      Interventions: Nasal cannula in place.      Comments: 2L NC   Neck:      Vascular: No " JVR. JVD normal.   Pulmonary:      Effort: Pulmonary effort is normal.      Breath sounds: Bilateral Rhonchi present.   Cardiovascular:      Normal rate. Irregularly irregular rhythm.      Murmurs: There is no murmur.   Pulses:     No decreased pulses.   Edema:     Peripheral edema present.     Thigh: bilateral trace edema of the thigh.     Pretibial: bilateral 1+ edema of the pretibial area.     Ankle: bilateral 2+ edema of the ankle.     Feet: bilateral 2+ pitting edema of the feet.  Abdominal:      General: There is distension.   Musculoskeletal:         General: Tenderness present. Skin:     General: Skin is warm and dry.   Neurological:      General: No focal deficit present.      Mental Status: Alert.   Psychiatric:         Behavior: Behavior is cooperative.            Labs:   Results from last 7 days   Lab Units 06/13/25  0620 06/12/25  0527 06/11/25  0536   WBC 10*3/mm3 10.59 9.14 8.45   HEMOGLOBIN g/dL 10.0* 9.3* 9.5*   HEMATOCRIT % 35.9* 33.0* 33.4*   PLATELETS 10*3/mm3 245 238 217     Lab Results   Lab Value Date/Time    TROPONINT 142 (C) 06/09/2025 1129    TROPONINT 144 (C) 06/09/2025 0948    TROPONINT 82 (C) 10/05/2024 1343    TROPONINT 86 (C) 10/05/2024 1129    TROPONINT 113 (C) 09/01/2024 2251    TROPONINT 109 (C) 09/01/2024 2038     Results from last 7 days   Lab Units 06/09/25  0948   INR  2.07*     Results from last 7 days   Lab Units 06/13/25  0620 06/12/25  1624 06/12/25  0527 06/11/25  1810 06/11/25  0536 06/10/25  0545 06/09/25  0948   SODIUM mmol/L 144  --  143 142 142   < > 143   POTASSIUM mmol/L 4.2 4.7 3.4* 4.2 3.5   < > 3.8   CHLORIDE mmol/L 94*  --  94* 93* 97*   < > 103   CO2 mmol/L 43.0*  --  42.0* 42.0* 35.0*   < > 29.0   BUN mg/dL 18.7  --  18.6 16.1 15.4   < > 16.6   CREATININE mg/dL 0.98  --  0.98 1.07 1.00   < > 0.92   CALCIUM mg/dL 8.7  --  8.5* 9.2 8.8   < > 8.9   BILIRUBIN mg/dL  --   --  0.3  --  0.4  --  0.5   ALK PHOS U/L  --   --  43  --  45  --  49   ALT (SGPT) U/L  --   --   9  --  13  --  20   AST (SGOT) U/L  --   --  20  --  20  --  25   GLUCOSE mg/dL 58*  --  65 186* 68   < > 87    < > = values in this interval not displayed.     Results from last 7 days   Lab Units 06/09/25  0948   HEMOGLOBIN A1C % 5.30     Results from last 7 days   Lab Units 06/09/25  1129   CHOLESTEROL mg/dL 97   TRIGLYCERIDES mg/dL 52   HDL CHOL mg/dL 54   LDL CHOL mg/dL 30     Results from last 7 days   Lab Units 06/09/25  1129   TSH uIU/mL 1.590               Medication Review:   atorvastatin, 10 mg, Oral, Daily  budesonide-formoterol, 2 puff, Inhalation, BID - RT  bumetanide, 1 mg, Intravenous, Q12H  empagliflozin, 10 mg, Oral, Daily  finasteride, 5 mg, Oral, Daily  insulin glargine, 10 Units, Subcutaneous, Nightly  insulin lispro, 2-7 Units, Subcutaneous, 4x Daily AC & at Bedtime  montelukast, 10 mg, Oral, Nightly  [Held by provider] nebivolol, 5 mg, Oral, Q24H  pantoprazole, 40 mg, Oral, BID AC  pharmacy consult - MTM, , Not Applicable, Daily  predniSONE, 10 mg, Oral, Daily  revefenacin, 175 mcg, Nebulization, Daily - RT  rivaroxaban, 20 mg, Oral, Daily With Dinner  spironolactone, 25 mg, Oral, Daily  tamsulosin, 0.4 mg, Oral, Daily             IMAGING/DIAGNOSTICS     Results for orders placed during the hospital encounter of 06/09/25    Adult Transthoracic Echo Complete W/ Cont if Necessary Per Protocol    Interpretation Summary    Left ventricular systolic function is normal. Calculated left ventricular EF = 67.2% Normal left ventricular cavity size noted. Left ventricular wall thickness is consistent with mild concentric hypertrophy. Elevated left atrial pressure.    The right ventricular cavity is mild to moderately dilated. Normal right ventricular systolic function noted.    Left atrial volume is severely increased.    There is moderate calcification of the aortic valve. No aortic valve regurgitation is present. Mild aortic valve stenosis is present. Aortic valve area is 1.49 cm2. Peak velocity of  the flow distal to the aortic valve is 259.2 cm/s. Aortic valve mean pressure gradient is 14.3 mmHg.    Mitral annular calcification is present. Mild mitral valve regurgitation is present. No significant mitral valve stenosis is present.    Mild to moderate tricuspid valve regurgitation is present. Estimated right ventricular systolic pressure from tricuspid regurgitation is moderately elevated (45-55 mmHg).      Problem List:     CHF (congestive heart failure)    Hyperkalemia    COPD (chronic obstructive pulmonary disease)    Afib    Chronic anticoagulation    Diabetes mellitus, type II    Sleep apnea    Hypertension    Acute blood loss anemia         Assessment:  HFpEF/CHF  EF 67%  Continue Bumex 1 mg twice daily for ongoing BLE edema, BLE duplex negative  Net diuresis ~17.1 L so far  Continue spironolactone and Jardiance, renal profile WNL  I's/O, daily standing weights, 1800 mL liter/day fluid restriction  Afib  [id number] , Xarelto 20 mg   Rate controlled on Nebivolol 5 mg  Maintain potassium 4-4.4 and magnesium 2-2.2, replace for goal  HLD  Lipid profile well-controlled  Atorvastatin 10  HTN  Normotensive  COPD  NEW  Important to wear NIPPV at bedtime and during daytime naps  DM II  A1c 5.3    Plan:   May be discharged home from cardiology standpoint with cardiology follow on June 26th at [facility]   Bumex 1 mg daily at discharge, will need repeat BMP and mag check during PCP follow-up in 1 week.  Continue Nebivolol 5 mg for rate control and Xarelto 20 mg daily for stroke prophylaxis  Continue spironolactone and Jardiance as part of heart failure management      SHALOM Casillas

## 2025-06-13 NOTE — DISCHARGE SUMMARY
[facility] Medicine Services  DISCHARGE SUMMARY    Patient Name: Jonatan Mcguire  : 1950  MRN: [id number]    Date of Admission: 2025  9:39 AM  Date of Discharge:  25   Primary Care Physician: Cheli Damico MD    Consults       Date and Time Order Name Status Description    2025 12:04 PM Inpatient Cardiology Consult Completed             Hospital Course     Presenting Problem: CHF exacerbation     Active Hospital Problems    Diagnosis  POA    **CHF (congestive heart failure) [I50.9]  Yes    Acute on chronic heart failure with preserved ejection fraction (HFpEF) [I50.33]  Yes    Chronic anticoagulation [Z79.01]  Not Applicable    Acute blood loss anemia [D62]  Yes    Sleep apnea [G47.30]  Yes    Hypertension [I10]  Yes    Diabetes mellitus, type II [E11.9]  Yes    COPD (chronic obstructive pulmonary disease) [J44.9]  Yes    Afib [I48.91]  Yes    Hyperkalemia [E87.5]  Yes      Resolved Hospital Problems   No resolved problems to display.          Hospital Course:  Jonatan Mcguire is a 75 y.o. male  with a PMH significant for atrial fibrillation on Xarelto for anticoagulation, COPD, T2DM, HTN, sleep apnea, obesity, chronic venous stasis bilaterally, OA and BPH.  Presented [facility] ED due to worsening shortness of breath, swelling in lower extremities bilaterally and decline in ADLs. Cardiology was consulted and with medical optimization.  He underwent stress test with PET myocardial perfusion on 2025 revealed no evidence of ischemia with impression of a low risk study.  She has follow-up scheduled primary cardiologist on 2025 at [facility].  She was instructed to keep this follow-up.  He will continue Bumex 1 mg daily at discharge and was recommended to have repeat BMP with magnesium checked in 1 week by PCP.  The patient's presenting symptoms have improved and he will be discharged home today in stable condition.     Acute CHF  exacerbation  Decline in ADLs  Exertional dyspnea  Lower extremity edema  HTN  --cardiology followed  -duplex negative and ECHO with EF 67%  --s/p dilt  -continue nebivolol 5 mg daily  -- Continue Bumex 1 mg daily  --started on spironolactone and Jardiance  --S/p stress test PET MPS revealed low risk study    UTI  --4+ bacteria, 21-50WBC  --cx with 25k Kleb  --monitoring off abx      Bilateral venous stasis dermatitis  Bullous dermatitis  --duplex negative  -PT wound followed  -CM arranged [facility]       Persistent atrial fibrillation  On chronic anticoagulation  --continue nebivolol, Xarelto     Hypokalemia  Hypomagnesemia  -replaced per protocol      COPD, not in exacerbation  --continue home inhalers     HLD  --continue Lipitor      T2DM  -- Resume home regimen  --A1C 5.3      Anemia  -Hgb 10 this AM   -stable     Sleep apnea        Discharge Follow Up Recommendations for outpatient labs/diagnostics:  Follow-up with PCP in 1 week with BMP and magnesium level.  Follow-up with cardiology at [facility] on 6/26/2025 as scheduled.    Day of Discharge     HPI:   Patient was seen resting in bed no apparent distress.  No acute events overnight per nursing.  He is currently feeling well and feels ready to discharge home.  We discussed the importance of taking all medications as prescribed and keeping all recommended follow-up appointments.  He expressed no additional concerns at this time.    Vital Signs:   Temp:  [97.9 °F (36.6 °C)-98.3 °F (36.8 °C)] 98.3 °F (36.8 °C)  Heart Rate:  [53-87] 87  Resp:  [16-18] 18  BP: (101-127)/(67-89) 112/89  Flow (L/min) (Oxygen Therapy):  [2] 2      Physical Exam:  Constitutional: No acute distress, awake, alert, chronically ill-appearing, obese  HENT: NCAT, mucous membranes moist  Respiratory: Coarse, diminished in bases, respiratory effort normal on 2 L  Cardiovascular: RRR, no murmurs, cap refill brisk   Gastrointestinal: Positive bowel sounds, soft, nontender,  nondistended  Musculoskeletal: BLE compression wraps in place  Psychiatric: Flat affect, cooperative  Neurologic: Oriented x 3, moves all extremities, speech clear  Skin: warm, dry, BLE wraps in place as above      Pertinent  and/or Most Recent Results     LAB RESULTS:      Lab 06/13/25  0620 06/12/25  0527 06/11/25  0536 06/10/25  0545 06/09/25  1129 06/09/25  0948   WBC 10.59 9.14 8.45 5.38  --  7.63   HEMOGLOBIN 10.0* 9.3* 9.5* 8.5*  --  9.5*   HEMATOCRIT 35.9* 33.0* 33.4* 29.9*  --  33.5*   PLATELETS 245 238 217 207  --  218   NEUTROS ABS  --  3.53 3.88  --   --  5.98   IMMATURE GRANS (ABS)  --  0.03 0.02  --   --  0.03   LYMPHS ABS  --  4.53* 3.49*  --   --  1.04   MONOS ABS  --  0.98* 1.00*  --   --  0.53   EOS ABS  --  0.04 0.04  --   --  0.03   MCV 92.5 92.2 92.3 92.9  --  93.8   CRP  --   --   --   --  1.06*  --    PROCALCITONIN  --   --   --   --  0.07  --    LACTATE  --   --   --   --   --  1.4   PROTIME  --   --   --   --   --  24.7*   D DIMER QUANT  --   --   --   --   --  1.35*         Lab 06/13/25  0620 06/12/25  1624 06/12/25  0527 06/11/25  1810 06/11/25  0536 06/10/25  1305 06/10/25  0545 06/09/25  1129 06/09/25  0948   SODIUM 144  --  143 142 142  --  143  --  143   POTASSIUM 4.2 4.7 3.4* 4.2 3.5   < > 3.8  --  3.8   CHLORIDE 94*  --  94* 93* 97*  --  101  --  103   CO2 43.0*  --  42.0* 42.0* 35.0*  --  34.0*  --  29.0   ANION GAP 7.0  --  7.0 7.0 10.0  --  8.0  --  11.0   BUN 18.7  --  18.6 16.1 15.4  --  14.8  --  16.6   CREATININE 0.98  --  0.98 1.07 1.00  --  0.87  --  0.92   EGFR 79.9  --  79.9 71.9 78.0  --  89.4  --  86.2   GLUCOSE 58*  --  65 186* 68  --  61*  --  87   CALCIUM 8.7  --  8.5* 9.2 8.8  --  8.4*  --  8.9   MAGNESIUM 2.1  --  1.4*  --  1.8  --  1.3*  --   --    HEMOGLOBIN A1C  --   --   --   --   --   --   --   --  5.30   TSH  --   --   --   --   --   --   --  1.590  --     < > = values in this interval not displayed.         Lab 06/12/25  0527 06/11/25  0536 06/09/25  0948    TOTAL PROTEIN 5.6* 5.8* 6.3   ALBUMIN 3.1* 3.3* 3.5   GLOBULIN 2.5 2.5 2.8   ALT (SGPT) 9 13 20   AST (SGOT) 20 20 25   BILIRUBIN 0.3 0.4 0.5   ALK PHOS 43 45 49         Lab 06/09/25  1129 06/09/25  0948   PROBNP  --  1,415.0   HSTROP T 142* 144*   PROTIME  --  24.7*   INR  --  2.07*         Lab 06/09/25  1129   CHOLESTEROL 97   LDL CHOL 30   HDL CHOL 54   TRIGLYCERIDES 52         Lab 06/09/25  1129   IRON 15*   IRON SATURATION (TSAT) 4*   TIBC 392   TRANSFERRIN 263   FERRITIN 37.30         Lab 06/09/25  1512   FIO2 28   CARBOXYHEMOGLOBIN (VENOUS) 1.6     Brief Urine Lab Results  (Last result in the past 365 days)        Color   Clarity   Blood   Leuk Est   Nitrite   Protein   CREAT   Urine HCG        06/09/25 1044 Yellow   Clear   Negative   Small (1+)   Negative   Negative                 Microbiology Results (last 10 days)       Procedure Component Value - Date/Time    Urine Culture - Urine, Urine, Clean Catch [[id number]]  (Abnormal)  (Susceptibility) Collected: 06/09/25 1533    Lab Status: Final result Specimen: Urine, Clean Catch Updated: 06/11/25 0825     Urine Culture 25,000 CFU/mL Klebsiella pneumoniae ssp pneumoniae    Narrative:      Colonization of the urinary tract without infection is common. Treatment is discouraged unless the patient is symptomatic, pregnant, or undergoing an invasive urologic procedure.    Susceptibility        Klebsiella pneumoniae ssp pneumoniae      PAUL      Amoxicillin + Clavulanate Susceptible      Ampicillin Resistant      Ampicillin + Sulbactam Susceptible      Cefazolin (Urine) Susceptible      Cefepime Susceptible      Ceftazidime Susceptible      Ceftriaxone Susceptible      Cefuroxime axetil Susceptible      Gentamicin Susceptible      Levofloxacin Susceptible      Nitrofurantoin Intermediate      Piperacillin + Tazobactam Susceptible      Trimethoprim + Sulfamethoxazole Susceptible                                   Stress Test With Pet Myocardial Perfusion  Result Date:  6/13/2025    Myocardial perfusion imaging indicates a normal myocardial perfusion study with no evidence of ischemia. Impressions are consistent with a low risk study.   Left ventricular ejection fraction is normal.   Rest EF = 56% Stress EF = 61%.   Extensive, dense three-vessel coronary calcification.     Duplex Venous Lower Extremity - Bilateral CAR  Result Date: 6/10/2025    Normal bilateral lower extremity venous duplex scan.   Right sided additional findings: Unable to visualize the peroneal veins     XR Chest 1 View  Result Date: 6/9/2025  XR CHEST 1 VW Date of Exam: 6/9/2025 9:44 AM EDT Indication: SOA triage protocol Comparison: 10/5/2024 Findings: Heart remains enlarged. Vasculature is cephalized. Patchy multifocal changes in the mid and lower lungs have mostly resolved, appearance of the chest close to the 9/1/2024 baseline exam. Mild left basilar pleural thickening is similar to the 9/1/2024 exam as well. No clearly new pulmonary parenchymal disease is seen.     Impression: 1. Cardiomegaly and mild pulmonary vascular congestion. 2. No new pulm parenchymal disease is seen. Electronically Signed: Theodore Guthrie MD  6/9/2025 10:05 AM EDT  Workstation ID: [id number]      Results for orders placed during the hospital encounter of 06/09/25    Duplex Venous Lower Extremity - Bilateral CAR    Interpretation Summary    Normal bilateral lower extremity venous duplex scan.    Right sided additional findings: Unable to visualize the peroneal veins      Results for orders placed during the hospital encounter of 06/09/25    Duplex Venous Lower Extremity - Bilateral CAR    Interpretation Summary    Normal bilateral lower extremity venous duplex scan.    Right sided additional findings: Unable to visualize the peroneal veins      Results for orders placed during the hospital encounter of 06/09/25    Adult Transthoracic Echo Complete W/ Cont if Necessary Per Protocol    Interpretation Summary    Left ventricular systolic  function is normal. Calculated left ventricular EF = 67.2% Normal left ventricular cavity size noted. Left ventricular wall thickness is consistent with mild concentric hypertrophy. Elevated left atrial pressure.    The right ventricular cavity is mild to moderately dilated. Normal right ventricular systolic function noted.    Left atrial volume is severely increased.    There is moderate calcification of the aortic valve. No aortic valve regurgitation is present. Mild aortic valve stenosis is present. Aortic valve area is 1.49 cm2. Peak velocity of the flow distal to the aortic valve is 259.2 cm/s. Aortic valve mean pressure gradient is 14.3 mmHg.    Mitral annular calcification is present. Mild mitral valve regurgitation is present. No significant mitral valve stenosis is present.    Mild to moderate tricuspid valve regurgitation is present. Estimated right ventricular systolic pressure from tricuspid regurgitation is moderately elevated (45-55 mmHg).      Plan for Follow-up of Pending Labs/Results: Follow-up as directed.    Discharge Details        Discharge Medications        New Medications        Instructions Start Date   bumetanide 0.5 MG tablet  Commonly known as: BUMEX   1 mg, Oral, Daily      empagliflozin 10 MG tablet tablet  Commonly known as: JARDIANCE   10 mg, Oral, Daily   Start Date: June 14, 2025     nebivolol 5 MG tablet  Commonly known as: BYSTOLIC   5 mg, Oral, Every 24 Hours Scheduled   Start Date: June 14, 2025            Changes to Medications        Instructions Start Date   spironolactone 25 MG tablet  Commonly known as: ALDACTONE  What changed: how much to take   25 mg, Oral, Daily   Start Date: June 14, 2025            Continue These Medications        Instructions Start Date   acetaminophen 500 MG tablet  Commonly known as: TYLENOL   500 mg, Every 6 Hours PRN      Advair Diskus 250-50 MCG/ACT DISKUS  Generic drug: Fluticasone-Salmeterol   2 puffs, 2 Times Daily - RT      albuterol sulfate   (90 Base) MCG/ACT inhaler  Commonly known as: PROVENTIL HFA;VENTOLIN HFA;PROAIR HFA   1 puff, Every 4 Hours PRN      atorvastatin 10 MG tablet  Commonly known as: LIPITOR   10 mg, Daily      finasteride 5 MG tablet  Commonly known as: PROSCAR   5 mg, Daily      glipizide 5 MG ER tablet  Commonly known as: GLUCOTROL XL   5 mg, Daily      insulin glargine 100 UNIT/ML injection  Commonly known as: LANTUS, SEMGLEE   17 Units, Nightly      levalbuterol 1.25 MG/3ML nebulizer solution  Commonly known as: XOPENEX   1 ampule, Every 4 Hours PRN      montelukast 10 MG tablet  Commonly known as: SINGULAIR   10 mg, Nightly      pantoprazole 40 MG EC tablet  Commonly known as: PROTONIX   40 mg, Oral, 2 Times Daily Before Meals      predniSONE 10 MG tablet  Commonly known as: DELTASONE   10 mg, Daily      Prenatal 27-1 27-1 MG tablet tablet   1 tablet, Daily      rivaroxaban 20 MG tablet  Commonly known as: XARELTO   20 mg, Daily With Dinner      tamsulosin 0.4 MG capsule 24 hr capsule  Commonly known as: FLOMAX   1 capsule, Daily      tiotropium 18 MCG per inhalation capsule  Commonly known as: SPIRIVA   1 capsule, Daily - RT             Stop These Medications      dilTIAZem  MG 24 hr capsule  Commonly known as: CARDIZEM CD              Allergies   Allergen Reactions    Tetanus Toxoids Unknown - Low Severity         Discharge Disposition: Home      Diet:  Hospital:  Diet Order   Procedures    Diet: Cardiac, Fluid Restriction (240 mL/tray); Low Sodium (2g); Other (Specify mL/day) (1800); Fluid Consistency: Thin (IDDSI 0)       Diet Instructions       Diet: Regular/House Diet, Cardiac Diets, Fluid Restriction (240 mL/tray) Diets; Low Sodium (2g); Regular (IDDSI 7); Thin (IDDSI 0); Other (Specify mL/day) (1800/day)      Discharge Diet:  Regular/House Diet  Cardiac Diets  Fluid Restriction (240 mL/tray) Diets       Cardiac Diet: Low Sodium (2g)    Texture: Regular (IDDSI 7)    Fluid Consistency: Thin (IDDSI 0)    Fluid  Restriction Diet (240 mL/tray): Other (Specify mL/day) Comment - 1800/day             Activity: As tolerated  Activity Instructions       Activity as Tolerated                   CODE STATUS:    Code Status and Medical Interventions: CPR (Attempt to Resuscitate); Full Support   Ordered at: 06/09/25 1433     Code Status (Patient has no pulse and is not breathing):    CPR (Attempt to Resuscitate)     Medical Interventions (Patient has pulse or is breathing):    Full Support     Level Of Support Discussed With:    Patient       Future Appointments   Date Time Provider Department Center   10/20/2025  3:15 PM Gabriel Grande MD [facility]       Additional Instructions for the Follow-ups that You Need to Schedule       Ambulatory Referral to Home Health   As directed      Face to Face Visit Date: 6/11/2025   Follow-up provider for Plan of Care?: I treated the patient in an acute care facility and will not continue treatment after discharge.   Follow-up provider: KD DAMICO [[id number]]   Reason/Clinical Findings: CHF   Describe mobility limitations that make leaving home difficult: Weakness, impaired physical mobility, impaired functional mobility   Nursing/Therapeutic Services Requested: Skilled Nursing Physical Therapy Occupational Therapy   Skilled nursing orders: CHF management Medication education Cardiopulmonary assessments Wound care dressing/changes O2 instruction   PT orders: Therapeutic exercise Gait Training Transfer training Strengthening Home safety assessment   Weight Bearing Status: As Tolerated   Occupational orders: Activities of daily living Energy conservation Strengthening Home safety assessment        Discharge Follow-up with PCP   As directed       Currently Documented PCP:    Kd Damico MD    PCP Phone Number:    [phone]     Follow Up Details: Follow-up with PCP in 1 week with BMP and magnesium        Discharge Follow-up with Specified Provider: Follow-up  with cardiology [place] [facility] on 6/26/2025 as scheduled.   As directed      To: Follow-up with cardiology [facility] on 6/26/2025 as scheduled.                      Roberto Comer, SHALOM  06/13/25      Time Spent on Discharge:  I spent  45  minutes on this discharge activity which included: face-to-face encounter with the patient, reviewing the data in the system, coordination of the care with the nursing staff as well as consultants, documentation, and entering orders.

## 2025-06-14 NOTE — OUTREACH NOTE
Prep Survey      Flowsheet Row Responses   Religion facility patient discharged from? [place]   Is LACE score < 7 ? No   Eligibility Readm Mgmt   Discharge diagnosis **CHF (congestive heart failure)   Does the patient have one of the following disease processes/diagnoses(primary or secondary)? CHF   Does the patient have Home health ordered? Yes   What is the Home health agency?  [facility]   Is there a DME ordered? No   Prep survey completed? Yes            TY STARR - Registered Nurse

## 2025-06-16 NOTE — PROGRESS NOTES
"[facility]  Heart Failure Clinic    Cardiologist/EP: [facility] cardiology  PCP: Cheli Damico MD    Chief Complaint  Congestive Heart Failure    PROBLEM LIST:  HFpEF  Echo 6/9/2025 EF 67%, mild LVH, elevated left atrial pressure, RVSP 45 to 55 mmHg, avg E/e' 19  Aortic valve stenosis  Mild per echo 6/2025  CAD  Stress PET 6/13/2025 negative for ischemia, extensive, dense three-vessel coronary calcification  Atrial fibrillation, permanent  Hypertension  COPD  Diabetes  Sleep apnea  Chronic venous stasis    Subjective    History of Present Illness    Jonatan Mcguire is a 75 y.o. male who presents today as a hospital referral for heart failure.    Patient was admitted 6/9 to 6/13 with acute HFpEF exacerbation.  He underwent stress PET which was negative for ischemia.  He was discharged on Bumex 1 mg daily, Jardiance, Nebivolol and spironolactone (previous 1/2). He says he is doing great, feels close to his baseline    Dyspnea: improved significantly  Lower extremity swelling: improved  Abdominal swelling: denies  Home weight: Current weight is 218. Previously 240. Discharge weight was around 218  Home BP/HRs: not checking  Orthopnea/pillows denies, wears CPAP, 2 pillows.    Hospital stays: 6/2025          Objective     Vital Signs:   Vitals:    06/17/25 0811 06/17/25 0814   BP: 108/59 106/55   BP Location: Left arm Left arm   Patient Position: Sitting Standing   Pulse: 65 79   SpO2: 95%    Weight: 98.9 kg (218 lb)    Height: 180.3 cm (71\")      Body mass index is 30.4 kg/m².  Physical Exam  Vitals reviewed.   Constitutional:       Appearance: Normal appearance.   HENT:      Head: Normocephalic.   Neck:      Vascular: No carotid bruit.   Cardiovascular:      Rate and Rhythm: Normal rate. Rhythm irregularly irregular.      Pulses: Normal pulses.      Heart sounds: Normal heart sounds, S1 normal and S2 normal. No murmur heard.     Comments: Wearing compression socks  Pulmonary:      " Effort: Pulmonary effort is normal. No respiratory distress.      Breath sounds: Examination of the left-lower field reveals rales. Rales present.   Chest:      Chest wall: No tenderness.   Abdominal:      General: Abdomen is flat.      Palpations: Abdomen is soft.   Musculoskeletal:      Cervical back: Neck supple.      Right lower le+ Pitting Edema present.      Left lower le+ Pitting Edema present.   Skin:     General: Skin is warm and dry.   Neurological:      General: No focal deficit present.      Mental Status: He is alert and oriented to person, place, and time. Mental status is at baseline.   Psychiatric:         Mood and Affect: Mood normal.         Behavior: Behavior normal.         Thought Content: Thought content normal.              Data Reviewed:  Lab Results   Component Value Date    GLUCOSE 58 (L) 2025    CALCIUM 8.7 2025     2025    K 4.2 2025    CO2 43.0 (H) 2025    CL 94 (L) 2025    BUN 18.7 2025    CREATININE 0.98 2025    EGFR 79.9 2025    BCR 19.1 2025    ANIONGAP 7.0 2025     Lab Results   Component Value Date    WBC 10.59 2025    HGB 10.0 (L) 2025    HCT 35.9 (L) 2025    MCV 92.5 2025     2025     Lab Results   Component Value Date    PROBNP 1,415.0 2025            DATE 25      ReDs lung fluid volume assessment  (Normal 25-35%) 28%                 Assessment and Plan   Chronic HFpEF  - LVEF 67%, NYHA class III ACC/AHA stage: C  - Euvolemic on exam, ReDs value normal  - Continue Bumex 1 mg daily  -Continue Jardiance 10 mg daily  -Continue spironolactone 25 mg daily  - Heart failure education provided today including signs and symptoms, causes of heart failure, medications, daily weights, low sodium diet and daily physical activity as tolerated. Reinforced reasons to call and diuretic plan.   - ReDs Vest  - Basic Metabolic Panel; Future  - Magnesium; Future    2.  Permanent atrial fibrillation  - Rate controlled.  Continue Xarelto  - [id number], continue Xarelto      He has an appointment next week with his primary cardiologist            Follow Up {Instructions Charge Capture  Follow-up Communications :23}   Return if symptoms worsen or fail to improve.      I have reviewed this documentation, made edits where appropriate, and agree with the final report of ReDS data/interpretation. In addition, I have the following to add:    Lung fluid content by ReDS assessment correlates to pulmonary capillary wedge pressure (Mike et al. JAHA 2018). In patients with LEFT-sided heart failure, elevated readings suggest that the patient MAY benefit from additional diuresis while low readings suggest that the patient MAY benefit from a reduction in diuretics; clinical correlation is recommended. Low normal and mildly elevated readings may be appropriate for some patients. In patients with RIGHT-sided heart failure, lung fluid content may be a less reliable marker for guiding diuresis.    Patient was given instructions and counseling regarding his condition or for health maintenance advice. Please see specific information pulled into the AVS if appropriate.  Advised to call the [facility] with any questions, concerns, or worsening symptoms.

## 2025-06-17 ENCOUNTER — RESULTS FOLLOW-UP (OUTPATIENT)
Dept: CARDIOLOGY | Facility: HOSPITAL | Age: 75
End: 2025-06-17

## 2025-06-17 ENCOUNTER — OFFICE VISIT (OUTPATIENT)
Dept: CARDIOLOGY | Facility: HOSPITAL | Age: 75
End: 2025-06-17
Payer: MEDICARE

## 2025-06-17 VITALS
SYSTOLIC BLOOD PRESSURE: 106 MMHG | HEART RATE: 79 BPM | HEIGHT: 71 IN | WEIGHT: 218 LBS | BODY MASS INDEX: 30.52 KG/M2 | DIASTOLIC BLOOD PRESSURE: 55 MMHG | OXYGEN SATURATION: 95 %

## 2025-06-17 DIAGNOSIS — I50.32 CHRONIC HEART FAILURE WITH PRESERVED EJECTION FRACTION (HFPEF): Primary | ICD-10-CM

## 2025-06-17 DIAGNOSIS — I48.21 PERMANENT ATRIAL FIBRILLATION: ICD-10-CM

## 2025-06-17 LAB
ABSOLUTE LUNG FLUID CONTENT: 28 % (ref 20–35)
ANION GAP SERPL CALCULATED.3IONS-SCNC: 12.5 MMOL/L (ref 5–15)
BUN SERPL-MCNC: 32 MG/DL (ref 8–23)
BUN/CREAT SERPL: 25.2 (ref 7–25)
CALCIUM SPEC-SCNC: 9.1 MG/DL (ref 8.6–10.5)
CHLORIDE SERPL-SCNC: 94 MMOL/L (ref 98–107)
CO2 SERPL-SCNC: 36.5 MMOL/L (ref 22–29)
CREAT SERPL-MCNC: 1.27 MG/DL (ref 0.76–1.27)
EGFRCR SERPLBLD CKD-EPI 2021: 58.6 ML/MIN/1.73
GLUCOSE SERPL-MCNC: 167 MG/DL (ref 65–99)
MAGNESIUM SERPL-MCNC: 2 MG/DL (ref 1.6–2.4)
POTASSIUM SERPL-SCNC: 3.7 MMOL/L (ref 3.5–5.2)
SODIUM SERPL-SCNC: 143 MMOL/L (ref 136–145)

## 2025-06-17 PROCEDURE — 80048 BASIC METABOLIC PNL TOTAL CA: CPT | Performed by: NURSE PRACTITIONER

## 2025-06-17 PROCEDURE — 83735 ASSAY OF MAGNESIUM: CPT | Performed by: NURSE PRACTITIONER

## 2025-06-17 PROCEDURE — 94726 PLETHYSMOGRAPHY LUNG VOLUMES: CPT | Performed by: NURSE PRACTITIONER

## 2025-06-17 PROCEDURE — 3078F DIAST BP <80 MM HG: CPT | Performed by: NURSE PRACTITIONER

## 2025-06-17 PROCEDURE — 3074F SYST BP LT 130 MM HG: CPT | Performed by: NURSE PRACTITIONER

## 2025-06-17 PROCEDURE — 99214 OFFICE O/P EST MOD 30 MIN: CPT | Performed by: NURSE PRACTITIONER

## 2025-06-17 NOTE — TELEPHONE ENCOUNTER
Called patient with lab results.  Renal function slightly declined, he could be getting dry from diuretics.  However, he reports he has been restricting his fluids.  I told him to start drinking at least 1.5 L a day and to have his labs repeated with his PCP in the next week or 2.  He verbalized understanding with no further questions or concerns

## 2025-06-17 NOTE — PROGRESS NOTES
Heart Failure Clinic    Date:  06/17/25     There were no vitals filed for this visit.     Indication:  Heart Failure     Procedure:  ReDS device sensor unit applied to right side of chest and right side of back.  Appropriate positioning confirmed based off of the unit's calculation.  Chest measurement obtained with the chest size ruler.  Measurement session performed over 45 seconds.      Method of arrival:  Ambulatory    Weighing self daily:  Yes    Taking medications as prescribed:  Yes    Edema:  Yes    Shortness of Air:  No    Number of pillows used at night:  <2    Results:  ReDS Value=     28 %                    Interpretation:  25-35% - normal/ideal lung fluid content    Miriam Grant MA 06/17/25 08:11 EDT

## 2025-06-18 ENCOUNTER — READMISSION MANAGEMENT (OUTPATIENT)
Dept: CALL CENTER | Facility: HOSPITAL | Age: 75
End: 2025-06-18
Payer: MEDICARE

## 2025-06-18 NOTE — OUTREACH NOTE
CHF Week 1 Survey      Flowsheet Row Responses   [facility] patient discharged from? [place]   Does the patient have one of the following disease processes/diagnoses(primary or secondary)? CHF   CHF Week 1 attempt successful? Yes   Call start time 1630   Call end time 1637   Discharge diagnosis **CHF (congestive heart failure)   Person spoke with today (if not patient) and relationship Patient   Medication alerts for this patient Bumex, Jardiance, Nebivolol, Micro K   Meds reviewed with patient/caregiver? Yes   Is the patient having any side effects they believe may be caused by any medication additions or changes? No   Does the patient have all medications ordered at discharge? Yes   Prescription comments No questions or concerns with medications.   Is the patient taking all medications as directed (includes completed medication regime)? Yes   Comments regarding appointments Patient saw Cardiology in [facility] yesterday, 6/17/25. He states he sees his regular cardiologist next Wed, 6/25/25 for f/u.   Does the patient have a primary care provider?  Yes   Does the patient have an appointment with their PCP within 7 days of discharge? Greater than 7 days   Comments regarding PCP PCP--Dr. Cheli Damico---appt 6/27/25   What is preventing the patient from scheduling follow up appointments within 7 days of discharge? Earlier appointment not available   Nursing Interventions Verified appointment date/time/provider   Has the patient kept scheduled appointments due by today? Yes   What is the Home health agency?  [facility] [place]   Has home health visited the patient within 72 hours of discharge? Yes   Home health comments  visited on Saturday. Patient reports legs were wrapped again.   Psychosocial issues? No   Comments Patient states he is doing better. He is weighing daily and weights have been stable, no wt gain.   Did the patient receive a copy of their discharge  instructions? Yes   Nursing interventions Reviewed instructions with patient   What is the patient's perception of their health status since discharge? Improving   Nursing interventions Nurse provided patient education   Is the patient able to teach back signs and symptoms of worsening condition? (i.e. weight gain, shortness of air, etc.) Yes   If the patient is a current smoker, are they able to teach back resources for cessation? Not a smoker   Is the patient/caregiver able to teach back the hierarchy of who to call/visit for symptoms/problems? PCP, Specialist, Home health nurse, Urgent Care, ED, 911 Yes   CHF Zone this Call Green Zone   Green Zone Patient reports doing well, No new or worsening shortness of breath, Physical activity level is normal for you, No new swelling -  feet, ankles and legs look normal for you, Weight check stable, No chest pain   Green Zone Interventions Daily weight check, Meds as directed, Low sodium diet, Follow up visits planned    CHF Week 1 call completed? Yes   Is the patient interested in additional calls from an ambulatory ? No   Would this patient benefit from a Referral to [facility] Social Work? No   Wrap up additional comments No questions or concerns at this time.   Call end time 0513            Uyen POLANCO - Registered Nurse      Uyen POLANCO - Registered Nurse

## 2025-06-27 ENCOUNTER — READMISSION MANAGEMENT (OUTPATIENT)
Dept: CALL CENTER | Facility: HOSPITAL | Age: 75
End: 2025-06-27
Payer: MEDICARE

## 2025-06-27 NOTE — OUTREACH NOTE
CHF Week 2 Survey      Flowsheet Row Responses   [facility] facility patient discharged from? [place]   Does the patient have one of the following disease processes/diagnoses(primary or secondary)? CHF   Week 2 attempt successful? No   Unsuccessful attempts Attempt 1            Vane VICK - Licensed Nurse

## 2025-07-02 ENCOUNTER — READMISSION MANAGEMENT (OUTPATIENT)
Dept: CALL CENTER | Facility: HOSPITAL | Age: 75
End: 2025-07-02
Payer: MEDICARE

## 2025-07-02 NOTE — OUTREACH NOTE
CHF Week 2 Survey      Flowsheet Row Responses   [facility] patient discharged from? [place]   Does the patient have one of the following disease processes/diagnoses(primary or secondary)? CHF   Week 2 attempt successful? Yes   Call start time 1557   Call end time 1600   Discharge diagnosis **CHF (congestive heart failure)   Person spoke with today (if not patient) and relationship Patient   Medication alerts for this patient Bumex, Jardiance, Nebivolol, Micro K   Meds reviewed with patient/caregiver? Yes   Is the patient having any side effects they believe may be caused by any medication additions or changes? No   Does the patient have all medications ordered at discharge? Yes   Prescription comments No questions or concerns with medications.   Is the patient taking all medications as directed (includes completed medication regime)? Yes   Does the patient have a primary care provider?  Yes   Comments regarding PCP PCP--Dr. Cheli Damico---appt 6/27/25, completed   Has the patient kept scheduled appointments due by today? Yes   What is the Home health agency?  [facility]   Has home health visited the patient within 72 hours of discharge? Yes   Home health comments  visiting. Next appt tomorrow per patient.   Psychosocial issues? No   Comments Patient states he is doing very well. His legs are still being wrapped, but look good per patient. No concerns or needs.   Did the patient receive a copy of their discharge instructions? Yes   Nursing interventions Reviewed instructions with patient   What is the patient's perception of their health status since discharge? Improving   Nursing interventions Nurse provided patient education   Is the patient able to teach back signs and symptoms of worsening condition? (i.e. weight gain, shortness of air, etc.) Yes   If the patient is a current smoker, are they able to teach back resources for cessation? Not a smoker   Is the  patient/caregiver able to teach back the hierarchy of who to call/visit for symptoms/problems? PCP, Specialist, Home health nurse, Urgent Care, ED, 911 Yes   CHF Zone this Call Green Zone   Green Zone Patient reports doing well, No new or worsening shortness of breath, Physical activity level is normal for you, No new swelling -  feet, ankles and legs look normal for you, Weight check stable, No chest pain   Green Zone Interventions Daily weight check, Meds as directed, Low sodium diet, Follow up visits planned   CHF Week 2 call completed? Yes   Revoked No further contact(revokes)-requires comment   Is the patient interested in additional calls from an ambulatory ? No   Would this patient benefit from a Referral to [facility] Social Work? No   Graduated/Revoked comments Patient doing well, no needs or concerns. No further calls needed.   Call end time 1600            Uyen POLANCO - Registered Nurse

## (undated) DEVICE — ADAPT CLN BIOGUARD AIR/H2O DISP

## (undated) DEVICE — LN SMPL CO2 SHTRM SD STREAM W/M LUER

## (undated) DEVICE — KT ORCA ORCAPOD DISP STRL

## (undated) DEVICE — TUBING, SUCTION, 1/4" X 10', STRAIGHT: Brand: MEDLINE

## (undated) DEVICE — SENSR O2 OXIMAX FNGR A/ 18IN NONSTR

## (undated) DEVICE — CANN O2 ETCO2 FITS ALL CONN CO2 SMPL A/ 7IN DISP LF